# Patient Record
Sex: FEMALE | Race: WHITE | Employment: OTHER | URBAN - METROPOLITAN AREA
[De-identification: names, ages, dates, MRNs, and addresses within clinical notes are randomized per-mention and may not be internally consistent; named-entity substitution may affect disease eponyms.]

---

## 2017-03-06 ENCOUNTER — OFFICE VISIT (OUTPATIENT)
Dept: DERMATOLOGY | Age: 47
End: 2017-03-06

## 2017-03-06 DIAGNOSIS — L29.9 SCALP PRURITUS: ICD-10-CM

## 2017-03-06 DIAGNOSIS — L57.0 AK (ACTINIC KERATOSIS): ICD-10-CM

## 2017-03-06 DIAGNOSIS — D22.5 MELANOCYTIC NEVUS OF TRUNK: ICD-10-CM

## 2017-03-06 DIAGNOSIS — R20.9 DISTURBANCE OF SKIN SENSATION: ICD-10-CM

## 2017-03-06 DIAGNOSIS — D17.23 LIPOMA OF RIGHT LOWER EXTREMITY: Primary | ICD-10-CM

## 2017-03-06 PROCEDURE — 99213 OFFICE O/P EST LOW 20 MIN: CPT | Performed by: DERMATOLOGY

## 2018-02-06 RX ORDER — TRIAMCINOLONE ACETONIDE 1 MG/G
CREAM TOPICAL
Qty: 80 G | Refills: 0 | Status: SHIPPED | OUTPATIENT
Start: 2018-02-06 | End: 2021-07-06

## 2018-03-14 RX ORDER — CLINDAMYCIN AND BENZOYL PEROXIDE 10; 50 MG/G; MG/G
GEL TOPICAL
Refills: 3 | OUTPATIENT
Start: 2018-03-14

## 2018-07-30 ENCOUNTER — OFFICE VISIT (OUTPATIENT)
Dept: DERMATOLOGY | Age: 48
End: 2018-07-30

## 2018-07-30 ENCOUNTER — TELEPHONE (OUTPATIENT)
Dept: DERMATOLOGY | Age: 48
End: 2018-07-30

## 2018-07-30 DIAGNOSIS — R21 RASH: Primary | ICD-10-CM

## 2018-07-30 PROCEDURE — 1036F TOBACCO NON-USER: CPT | Performed by: DERMATOLOGY

## 2018-07-30 PROCEDURE — G8427 DOCREV CUR MEDS BY ELIG CLIN: HCPCS | Performed by: DERMATOLOGY

## 2018-07-30 PROCEDURE — G8421 BMI NOT CALCULATED: HCPCS | Performed by: DERMATOLOGY

## 2018-07-30 PROCEDURE — 99213 OFFICE O/P EST LOW 20 MIN: CPT | Performed by: DERMATOLOGY

## 2018-07-30 RX ORDER — LANOLIN ALCOHOL/MO/W.PET/CERES
3 CREAM (GRAM) TOPICAL DAILY
COMMUNITY
End: 2022-09-14

## 2018-07-30 RX ORDER — AMITRIPTYLINE HYDROCHLORIDE 50 MG/1
50 TABLET, FILM COATED ORAL NIGHTLY
COMMUNITY
End: 2021-07-23 | Stop reason: SDUPTHER

## 2018-07-30 RX ORDER — CLINDAMYCIN PHOSPHATE AND BENZOYL PEROXIDE 10; 50 MG/G; MG/G
GEL TOPICAL
Qty: 45 G | Refills: 3 | Status: SHIPPED | OUTPATIENT
Start: 2018-07-30 | End: 2019-01-08

## 2018-07-30 NOTE — PROGRESS NOTES
[facility]  Jeanelle Cranker, MD  [phone]      Shon Dhaliwal  1970    53 y.o. female     Date of Visit: 7/30/2018    Chief Complaint: skin lesions    History of Present Illness:    She complains of a tender erythematous papular eruption on the face and scalp. Had improvement in the past with clinda/BPO gel. She reports a history of staph skin infection and is concerned she has an infection. Had normal nasal culture on 11/9/16. Other Derm Hx:    SCC in situ of the left medial calf treated with curettage in March 2016. BCC the right lower calf treated with curettage in March 2016. SCC in situ of the left lower shintreated with curettage on 9/1/2015. Review of Systems:  Gen: Feels well, good sense of health. Skin: No new or changing moles, no history of keloids or hypertrophic scars. Heme: No abnormal bruising or bleeding. Past Medical History, Family History, Surgical History, Medications and Allergies reviewed.     Past Medical History:   Diagnosis Date    Anxiety     Atrophic kidney     left    Cancer (HCC)     skin (basal cell)    Cellulitis     recurrent    Cervical disc herniation     Endometriosis     Lumbar disc disorder 2/21/2012    Migraines     Narcolepsy     Nephrolithiasis 2/21/2012    Ovarian cyst 2/21/2012     Past Surgical History:   Procedure Laterality Date    FOOT SURGERY  12/27/2012    BROSTROM ANKLE LIGAMENT REPAIR LEFT, PARTIAL EXCISION OF BONE    LAPAROSCOPY      for endometriosis    LUMBAR FUSION  2008    SHOULDER SURGERY      left    TONSILLECTOMY  2006       Allergies   Allergen Reactions    Codeine Hives    Percocet [Oxycodone-Acetaminophen] Itching    Prednisone     Tetracyclines & Related      Outpatient Prescriptions Marked as Taking for the 7/30/18 encounter (Office Visit) with Dat Moise MD   Medication Sig Dispense Refill    amitriptyline (ELAVIL) 50 MG tablet Take 50 mg by mouth nightly      melatonin 3 MG

## 2018-07-30 NOTE — TELEPHONE ENCOUNTER
Pt states she has bumps on her hairline and post neck, she is washing with Hibiclens and feels she has staph all over her face. Pt scheduled for today.

## 2018-08-02 LAB
GRAM STAIN RESULT: NORMAL
WOUND/ABSCESS: NORMAL

## 2019-01-08 ENCOUNTER — OFFICE VISIT (OUTPATIENT)
Dept: DERMATOLOGY | Age: 49
End: 2019-01-08
Payer: MEDICARE

## 2019-01-08 DIAGNOSIS — L70.9 ADULT ACNE: Primary | ICD-10-CM

## 2019-01-08 DIAGNOSIS — D17.23 LIPOMA OF RIGHT LOWER EXTREMITY: ICD-10-CM

## 2019-01-08 DIAGNOSIS — L57.0 AK (ACTINIC KERATOSIS): ICD-10-CM

## 2019-01-08 DIAGNOSIS — D23.71: ICD-10-CM

## 2019-01-08 DIAGNOSIS — K13.0 CHEILITIS: ICD-10-CM

## 2019-01-08 PROCEDURE — 17000 DESTRUCT PREMALG LESION: CPT | Performed by: DERMATOLOGY

## 2019-01-08 PROCEDURE — 4004F PT TOBACCO SCREEN RCVD TLK: CPT | Performed by: DERMATOLOGY

## 2019-01-08 PROCEDURE — G8484 FLU IMMUNIZE NO ADMIN: HCPCS | Performed by: DERMATOLOGY

## 2019-01-08 PROCEDURE — G8421 BMI NOT CALCULATED: HCPCS | Performed by: DERMATOLOGY

## 2019-01-08 PROCEDURE — G8427 DOCREV CUR MEDS BY ELIG CLIN: HCPCS | Performed by: DERMATOLOGY

## 2019-01-08 PROCEDURE — 99213 OFFICE O/P EST LOW 20 MIN: CPT | Performed by: DERMATOLOGY

## 2019-01-08 RX ORDER — CLINDAMYCIN PHOSPHATE AND BENZOYL PEROXIDE 10; 50 MG/G; MG/G
GEL TOPICAL
Qty: 45 G | Refills: 3 | Status: SHIPPED | OUTPATIENT
Start: 2019-01-08 | End: 2019-01-11 | Stop reason: SDUPTHER

## 2019-01-11 ENCOUNTER — TELEPHONE (OUTPATIENT)
Dept: DERMATOLOGY | Age: 49
End: 2019-01-11

## 2019-01-11 RX ORDER — CLINDAMYCIN PHOSPHATE AND BENZOYL PEROXIDE 10; 50 MG/G; MG/G
GEL TOPICAL
Qty: 45 G | Refills: 3 | Status: SHIPPED | OUTPATIENT
Start: 2019-01-11 | End: 2021-07-26

## 2019-02-27 ENCOUNTER — PATIENT MESSAGE (OUTPATIENT)
Dept: DERMATOLOGY | Age: 49
End: 2019-02-27

## 2019-08-18 ENCOUNTER — HOSPITAL ENCOUNTER (EMERGENCY)
Age: 49
Discharge: HOME | End: 2019-08-19
Attending: EMERGENCY MEDICINE
Payer: MEDICARE

## 2019-08-18 DIAGNOSIS — R10.13 ABDOMINAL PAIN, EPIGASTRIC: Primary | ICD-10-CM

## 2019-08-18 DIAGNOSIS — N30.00 ACUTE CYSTITIS WITHOUT HEMATURIA: ICD-10-CM

## 2019-08-18 DIAGNOSIS — R10.13 DYSPEPSIA: ICD-10-CM

## 2019-08-18 PROCEDURE — 99284 EMERGENCY DEPT VISIT MOD MDM: CPT

## 2019-08-18 RX ORDER — SODIUM CHLORIDE, SODIUM LACTATE, POTASSIUM CHLORIDE, CALCIUM CHLORIDE 600; 310; 30; 20 MG/100ML; MG/100ML; MG/100ML; MG/100ML
1000 INJECTION, SOLUTION INTRAVENOUS ONCE
Status: COMPLETED | OUTPATIENT
Start: 2019-08-18 | End: 2019-08-19

## 2019-08-18 RX ORDER — ONDANSETRON 2 MG/ML
4 INJECTION INTRAMUSCULAR; INTRAVENOUS EVERY 30 MIN PRN
Status: DISCONTINUED | OUTPATIENT
Start: 2019-08-18 | End: 2019-08-19 | Stop reason: HOSPADM

## 2019-08-18 ASSESSMENT — PAIN SCALES - GENERAL: PAINLEVEL_OUTOF10: 8

## 2019-08-18 ASSESSMENT — PAIN DESCRIPTION - LOCATION: LOCATION: ABDOMEN

## 2019-08-18 ASSESSMENT — PAIN DESCRIPTION - ORIENTATION: ORIENTATION: UPPER

## 2019-08-18 ASSESSMENT — PAIN DESCRIPTION - PAIN TYPE: TYPE: ACUTE PAIN

## 2019-08-18 ASSESSMENT — PAIN DESCRIPTION - DESCRIPTORS: DESCRIPTORS: SHARP;BURNING

## 2019-08-19 VITALS
SYSTOLIC BLOOD PRESSURE: 114 MMHG | DIASTOLIC BLOOD PRESSURE: 76 MMHG | RESPIRATION RATE: 16 BRPM | HEART RATE: 63 BPM | TEMPERATURE: 98 F | OXYGEN SATURATION: 97 %

## 2019-08-19 LAB
A/G RATIO: 1.4 (ref 1.1–2.2)
ALBUMIN SERPL-MCNC: 4.2 G/DL (ref 3.4–5)
ALP BLD-CCNC: 69 U/L (ref 40–129)
ALT SERPL-CCNC: 9 U/L (ref 10–40)
ANION GAP SERPL CALCULATED.3IONS-SCNC: 15 MMOL/L (ref 3–16)
AST SERPL-CCNC: 17 U/L (ref 15–37)
BACTERIA: ABNORMAL /HPF
BILIRUB SERPL-MCNC: 0.7 MG/DL (ref 0–1)
BILIRUBIN URINE: NEGATIVE
BLOOD, URINE: NEGATIVE
BUN BLDV-MCNC: 8 MG/DL (ref 7–20)
CALCIUM SERPL-MCNC: 9.1 MG/DL (ref 8.3–10.6)
CHLORIDE BLD-SCNC: 102 MMOL/L (ref 99–110)
CLARITY: CLEAR
CO2: 23 MMOL/L (ref 21–32)
COLOR: YELLOW
CREAT SERPL-MCNC: 0.6 MG/DL (ref 0.6–1.1)
EKG ATRIAL RATE: 72 BPM
EKG DIAGNOSIS: NORMAL
EKG P AXIS: 56 DEGREES
EKG P-R INTERVAL: 140 MS
EKG Q-T INTERVAL: 390 MS
EKG QRS DURATION: 72 MS
EKG QTC CALCULATION (BAZETT): 427 MS
EKG R AXIS: 82 DEGREES
EKG T AXIS: 68 DEGREES
EKG VENTRICULAR RATE: 72 BPM
EPITHELIAL CELLS, UA: ABNORMAL /HPF
GFR AFRICAN AMERICAN: >60
GFR NON-AFRICAN AMERICAN: >60
GLOBULIN: 3 G/DL
GLUCOSE BLD-MCNC: 96 MG/DL (ref 70–99)
GLUCOSE URINE: NEGATIVE MG/DL
HCT VFR BLD CALC: 40.1 % (ref 36–48)
HEMOGLOBIN: 13.7 G/DL (ref 12–16)
KETONES, URINE: 15 MG/DL
LACTIC ACID: 1 MMOL/L (ref 0.4–2)
LEUKOCYTE ESTERASE, URINE: ABNORMAL
LIPASE: 21 U/L (ref 13–60)
MAGNESIUM: 1.8 MG/DL (ref 1.8–2.4)
MCH RBC QN AUTO: 32.5 PG (ref 26–34)
MCHC RBC AUTO-ENTMCNC: 34.2 G/DL (ref 31–36)
MCV RBC AUTO: 94.9 FL (ref 80–100)
MICROSCOPIC EXAMINATION: YES
NITRITE, URINE: POSITIVE
PDW BLD-RTO: 12.8 % (ref 12.4–15.4)
PH UA: 6.5 (ref 5–8)
PLATELET # BLD: 285 K/UL (ref 135–450)
PMV BLD AUTO: 7.6 FL (ref 5–10.5)
POTASSIUM REFLEX MAGNESIUM: 3.5 MMOL/L (ref 3.5–5.1)
PROTEIN UA: ABNORMAL MG/DL
RBC # BLD: 4.22 M/UL (ref 4–5.2)
RBC UA: ABNORMAL /HPF (ref 0–2)
SODIUM BLD-SCNC: 140 MMOL/L (ref 136–145)
SPECIFIC GRAVITY UA: 1.02 (ref 1–1.03)
TOTAL PROTEIN: 7.2 G/DL (ref 6.4–8.2)
TROPONIN: <0.01 NG/ML
URINE TYPE: ABNORMAL
UROBILINOGEN, URINE: 0.2 E.U./DL
WBC # BLD: 8.3 K/UL (ref 4–11)
WBC UA: ABNORMAL /HPF (ref 0–5)

## 2019-08-19 PROCEDURE — 96374 THER/PROPH/DIAG INJ IV PUSH: CPT

## 2019-08-19 PROCEDURE — 6370000000 HC RX 637 (ALT 250 FOR IP): Performed by: EMERGENCY MEDICINE

## 2019-08-19 PROCEDURE — 96361 HYDRATE IV INFUSION ADD-ON: CPT

## 2019-08-19 PROCEDURE — 93005 ELECTROCARDIOGRAM TRACING: CPT | Performed by: EMERGENCY MEDICINE

## 2019-08-19 PROCEDURE — 83735 ASSAY OF MAGNESIUM: CPT

## 2019-08-19 PROCEDURE — 36415 COLL VENOUS BLD VENIPUNCTURE: CPT

## 2019-08-19 PROCEDURE — 83605 ASSAY OF LACTIC ACID: CPT

## 2019-08-19 PROCEDURE — 6360000002 HC RX W HCPCS: Performed by: EMERGENCY MEDICINE

## 2019-08-19 PROCEDURE — 2580000003 HC RX 258: Performed by: EMERGENCY MEDICINE

## 2019-08-19 PROCEDURE — 80053 COMPREHEN METABOLIC PANEL: CPT

## 2019-08-19 PROCEDURE — 83690 ASSAY OF LIPASE: CPT

## 2019-08-19 PROCEDURE — 84484 ASSAY OF TROPONIN QUANT: CPT

## 2019-08-19 PROCEDURE — 85027 COMPLETE CBC AUTOMATED: CPT

## 2019-08-19 PROCEDURE — 81001 URINALYSIS AUTO W/SCOPE: CPT

## 2019-08-19 RX ORDER — OMEPRAZOLE 20 MG/1
20 CAPSULE, DELAYED RELEASE ORAL
Qty: 30 CAPSULE | Refills: 5 | Status: SHIPPED | OUTPATIENT
Start: 2019-08-19 | End: 2019-12-04

## 2019-08-19 RX ORDER — SUCRALFATE 1 G/1
1 TABLET ORAL 4 TIMES DAILY
Qty: 120 TABLET | Refills: 3 | Status: SHIPPED | OUTPATIENT
Start: 2019-08-19 | End: 2019-12-04

## 2019-08-19 RX ORDER — NITROFURANTOIN 25; 75 MG/1; MG/1
100 CAPSULE ORAL 2 TIMES DAILY
Qty: 10 CAPSULE | Refills: 0 | Status: SHIPPED | OUTPATIENT
Start: 2019-08-19 | End: 2019-08-24

## 2019-08-19 RX ORDER — ONDANSETRON 4 MG/1
4 TABLET, ORALLY DISINTEGRATING ORAL EVERY 8 HOURS PRN
Qty: 12 TABLET | Refills: 0 | Status: SHIPPED | OUTPATIENT
Start: 2019-08-19 | End: 2019-12-04

## 2019-08-19 RX ADMIN — ONDANSETRON 4 MG: 2 INJECTION INTRAMUSCULAR; INTRAVENOUS at 00:24

## 2019-08-19 RX ADMIN — LIDOCAINE HYDROCHLORIDE: 20 SOLUTION ORAL; TOPICAL at 00:24

## 2019-08-19 RX ADMIN — SODIUM CHLORIDE, POTASSIUM CHLORIDE, SODIUM LACTATE AND CALCIUM CHLORIDE 1000 ML: 600; 310; 30; 20 INJECTION, SOLUTION INTRAVENOUS at 00:25

## 2019-08-19 ASSESSMENT — PAIN SCALES - GENERAL: PAINLEVEL_OUTOF10: 0

## 2019-08-19 NOTE — ED PROVIDER NOTES
[id number]          ATTENDING PHYSICIAN NOTE       Date of evaluation: 8/18/2019    Chief Complaint     Abdominal Pain and Emesis      History of Present Illness     Jeff Frias is a 51 y.o. female who presents with 2 weeks of abdominal pain, of a burning quality in the epigastrium. Reports is nonradiating. No pain in the right upper quadrant. She has had some nausea with this, vomited today. She reports that it is worse with eating. Denies bowel or bladder symptoms associated with it. She reports she has had a UTI, but finished taking medication last week but has not had dysuria or urgency since that time. Review of Systems     Review of Systems  Positives negatives listed in HPI; otherwise all systems are reviewed and were negative  Past Medical, Surgical, Family, and Social History     She has a past medical history of Anxiety, Atrophic kidney, Cancer ([facility] [address] Cellulitis, Cervical disc herniation, Endometriosis, Lumbar disc disorder, Migraines, Narcolepsy, Nephrolithiasis, and Ovarian cyst.  She has a past surgical history that includes Tonsillectomy (2006); lumbar fusion (2008); laparoscopy; shoulder surgery; and Foot surgery (12/27/2012). Her family history includes Cancer in her mother and sister; Diabetes in her maternal aunt and sister; Hypertension in her father and sister. She reports that she has been smoking cigarettes. She has a 6.00 pack-year smoking history. She has never used smokeless tobacco. She reports that she drinks alcohol. She reports that she does not use drugs. Medications     Previous Medications    AMITRIPTYLINE (ELAVIL) 50 MG TABLET    Take 50 mg by mouth nightly    CLINDAMYCIN-BENZOYL PER, REFR, 1.2-5 % GEL    Apply to the face once a day in the morning for acne. CLONAZEPAM (KLONOPIN) 1 MG TABLET    Take 1 mg by mouth nightly as needed. FUROSEMIDE (LASIX) 20 MG TABLET    Take 20 mg by mouth as needed.       IBUPROFEN referral, but for the moment we will treat for dyspepsia type symptoms. .        Clinical Impression     1. Abdominal pain, epigastric    2. Dyspepsia    3.  Acute cystitis without hematuria        Disposition     PATIENT REFERRED TO:  Kristin Chu MD  [address] [facility]  [phone]    In 3 days        DISCHARGE MEDICATIONS:  New Prescriptions    NITROFURANTOIN, MACROCRYSTAL-MONOHYDRATE, (MACROBID) 100 MG CAPSULE    Take 1 capsule by mouth 2 times daily for 5 days    OMEPRAZOLE (PRILOSEC) 20 MG DELAYED RELEASE CAPSULE    Take 1 capsule by mouth every morning (before breakfast)    ONDANSETRON (ZOFRAN ODT) 4 MG DISINTEGRATING TABLET    Take 1 tablet by mouth every 8 hours as needed for Nausea    SUCRALFATE (CARAFATE) 1 GM TABLET    Take 1 tablet by mouth 4 times daily       DISPOSITION Decision To Discharge 08/19/2019 02:11:23 AM        Cayden Tyler MD  08/19/19 1705

## 2019-12-03 ENCOUNTER — TELEPHONE (OUTPATIENT)
Dept: DERMATOLOGY | Age: 49
End: 2019-12-03

## 2019-12-04 ENCOUNTER — TELEPHONE (OUTPATIENT)
Dept: DERMATOLOGY | Age: 49
End: 2019-12-04

## 2019-12-04 ENCOUNTER — OFFICE VISIT (OUTPATIENT)
Dept: DERMATOLOGY | Age: 49
End: 2019-12-04
Payer: MEDICARE

## 2019-12-04 DIAGNOSIS — L70.9 ADULT ACNE: Primary | ICD-10-CM

## 2019-12-04 PROCEDURE — 4004F PT TOBACCO SCREEN RCVD TLK: CPT | Performed by: DERMATOLOGY

## 2019-12-04 PROCEDURE — G8421 BMI NOT CALCULATED: HCPCS | Performed by: DERMATOLOGY

## 2019-12-04 PROCEDURE — 99213 OFFICE O/P EST LOW 20 MIN: CPT | Performed by: DERMATOLOGY

## 2019-12-04 PROCEDURE — G8427 DOCREV CUR MEDS BY ELIG CLIN: HCPCS | Performed by: DERMATOLOGY

## 2019-12-04 PROCEDURE — G8484 FLU IMMUNIZE NO ADMIN: HCPCS | Performed by: DERMATOLOGY

## 2019-12-04 RX ORDER — SULFAMETHOXAZOLE AND TRIMETHOPRIM 800; 160 MG/1; MG/1
1 TABLET ORAL 2 TIMES DAILY
Qty: 60 TABLET | Refills: 1 | Status: SHIPPED | OUTPATIENT
Start: 2019-12-04 | End: 2020-06-01

## 2019-12-04 RX ORDER — FLUCONAZOLE 150 MG/1
150 TABLET ORAL ONCE
Qty: 1 TABLET | Refills: 0 | Status: SHIPPED | OUTPATIENT
Start: 2019-12-04 | End: 2019-12-04

## 2020-01-08 ENCOUNTER — OFFICE VISIT (OUTPATIENT)
Dept: DERMATOLOGY | Age: 50
End: 2020-01-08
Payer: MEDICARE

## 2020-01-08 PROCEDURE — 99213 OFFICE O/P EST LOW 20 MIN: CPT | Performed by: DERMATOLOGY

## 2020-01-08 PROCEDURE — G8427 DOCREV CUR MEDS BY ELIG CLIN: HCPCS | Performed by: DERMATOLOGY

## 2020-01-08 PROCEDURE — 4004F PT TOBACCO SCREEN RCVD TLK: CPT | Performed by: DERMATOLOGY

## 2020-01-08 PROCEDURE — G8421 BMI NOT CALCULATED: HCPCS | Performed by: DERMATOLOGY

## 2020-01-08 PROCEDURE — G8484 FLU IMMUNIZE NO ADMIN: HCPCS | Performed by: DERMATOLOGY

## 2020-01-08 RX ORDER — DEXTROAMPHETAMINE SACCHARATE, AMPHETAMINE ASPARTATE, DEXTROAMPHETAMINE SULFATE AND AMPHETAMINE SULFATE 7.5; 7.5; 7.5; 7.5 MG/1; MG/1; MG/1; MG/1
1 TABLET ORAL
COMMUNITY
Start: 2020-01-21 | End: 2021-07-06

## 2020-01-14 ENCOUNTER — HOSPITAL ENCOUNTER (OUTPATIENT)
Dept: NUCLEAR MEDICINE | Age: 50
Discharge: HOME | End: 2020-01-14
Payer: MEDICARE

## 2020-01-14 PROCEDURE — 78707 K FLOW/FUNCT IMAGE W/O DRUG: CPT

## 2020-01-14 PROCEDURE — A9562 TC99M MERTIATIDE: HCPCS | Performed by: UROLOGY

## 2020-01-14 PROCEDURE — 3430000000 HC RX DIAGNOSTIC RADIOPHARMACEUTICAL: Performed by: UROLOGY

## 2020-01-14 RX ADMIN — Medication 8.7 MILLICURIE: at 11:05

## 2020-01-31 ENCOUNTER — HOSPITAL ENCOUNTER (OUTPATIENT)
Dept: CT IMAGING | Age: 50
Discharge: HOME | End: 2020-01-31
Payer: MEDICARE

## 2020-01-31 PROCEDURE — 74176 CT ABD & PELVIS W/O CONTRAST: CPT

## 2020-04-30 ENCOUNTER — OFFICE VISIT (OUTPATIENT)
Dept: PRIMARY CARE CLINIC | Age: 50
End: 2020-04-30
Payer: MEDICARE

## 2020-04-30 VITALS — TEMPERATURE: 98.7 F

## 2020-04-30 PROCEDURE — 99213 OFFICE O/P EST LOW 20 MIN: CPT | Performed by: NURSE PRACTITIONER

## 2020-04-30 PROCEDURE — G8428 CUR MEDS NOT DOCUMENT: HCPCS | Performed by: NURSE PRACTITIONER

## 2020-04-30 PROCEDURE — 4004F PT TOBACCO SCREEN RCVD TLK: CPT | Performed by: NURSE PRACTITIONER

## 2020-04-30 PROCEDURE — G8421 BMI NOT CALCULATED: HCPCS | Performed by: NURSE PRACTITIONER

## 2020-05-03 LAB
SARS-COV-2: NOT DETECTED
SOURCE: NORMAL

## 2020-05-04 NOTE — RESULT ENCOUNTER NOTE
Please contact patient with their testing results: Your test for COVID-19, also known as novel coronavirus, came back negative. No virus was detected from the sample from your nose. Until your symptoms are fully resolved, you may still be contagious. We recommend that you remain isolated for 7 days minimum or 72 hours after your symptoms have completely resolved, whichever is longer. For example, if all symptoms improve after 2 days, you should still remain isolated for 7 days. If your symptoms get better after 10 days, you should remain isolated for 13 days. Continually monitor symptoms. Contact a medical provider if symptoms are worsening. If you have any additional questions, contact your doctor.     For additional information, please visit the Centers for Disease Control and Prevention (Whodinir.com.cy

## 2020-06-01 RX ORDER — SULFAMETHOXAZOLE AND TRIMETHOPRIM 800; 160 MG/1; MG/1
TABLET ORAL
Qty: 60 TABLET | Refills: 1 | Status: SHIPPED | OUTPATIENT
Start: 2020-06-01 | End: 2021-07-06 | Stop reason: ALTCHOICE

## 2020-06-09 ENCOUNTER — TELEPHONE (OUTPATIENT)
Dept: DERMATOLOGY | Age: 50
End: 2020-06-09

## 2020-07-07 ENCOUNTER — TELEPHONE (OUTPATIENT)
Dept: DERMATOLOGY | Age: 50
End: 2020-07-07

## 2020-07-07 NOTE — TELEPHONE ENCOUNTER
Dr Dionne Delgado patient  Pt c/b #[phone]  Pt stated  Having some hair/scalp breakouts including face as well doesn't know if she should continue taking bactrim everyday.   Please c/b to discuss

## 2020-07-08 NOTE — TELEPHONE ENCOUNTER
Called and left message for patient to call back office. Can offer an opening on 7/9/20 if still available.

## 2020-07-10 ENCOUNTER — OFFICE VISIT (OUTPATIENT)
Dept: DERMATOLOGY | Age: 50
End: 2020-07-10
Payer: MEDICARE

## 2020-07-10 VITALS — TEMPERATURE: 97.7 F

## 2020-07-10 PROCEDURE — G8421 BMI NOT CALCULATED: HCPCS | Performed by: DERMATOLOGY

## 2020-07-10 PROCEDURE — 99213 OFFICE O/P EST LOW 20 MIN: CPT | Performed by: DERMATOLOGY

## 2020-07-10 PROCEDURE — G8427 DOCREV CUR MEDS BY ELIG CLIN: HCPCS | Performed by: DERMATOLOGY

## 2020-07-10 PROCEDURE — 4004F PT TOBACCO SCREEN RCVD TLK: CPT | Performed by: DERMATOLOGY

## 2020-07-10 RX ORDER — SELENIUM SULFIDE 2.5 MG/100ML
LOTION TOPICAL
Qty: 1 BOTTLE | Refills: 5 | Status: SHIPPED | OUTPATIENT
Start: 2020-07-10 | End: 2021-11-19

## 2020-07-10 NOTE — PROGRESS NOTES
[facility]  Edith Aceves MD  [phone]      Kartik Keating  1970    53 y.o. female     Date of Visit: 7/10/2020    Chief Complaint: scalp rash    History of Present Illness:    1. F/u for scalp pruritus - flared recently. Scratching has led to multiple painful lesions. Started Bactrim again recently with some improvement. 2.  Follow up for adult acne with excoriations - using Differin and clinda/BPO gel daily with some improvement. Review of Systems:  Skin: No new or changing moles. Past Medical History, Family History, Surgical History, Medications and Allergies reviewed. Past Medical History:   Diagnosis Date    Anxiety     Atrophic kidney     left    Cancer (HCC)     skin (basal cell)    Cellulitis     recurrent    Cervical disc herniation     Endometriosis     Lumbar disc disorder 2/21/2012    Migraines     Narcolepsy     Nephrolithiasis 2/21/2012    Ovarian cyst 2/21/2012     Past Surgical History:   Procedure Laterality Date    FOOT SURGERY  12/27/2012    BROSTROM ANKLE LIGAMENT REPAIR LEFT, PARTIAL EXCISION OF BONE    LAPAROSCOPY      for endometriosis    LUMBAR FUSION  2008    SHOULDER SURGERY      left    TONSILLECTOMY  2006       Allergies   Allergen Reactions    Codeine Hives    Percocet [Oxycodone-Acetaminophen] Itching    Prednisone     Tetracyclines & Related      Outpatient Medications Marked as Taking for the 7/10/20 encounter (Office Visit) with Griffin Arrieta MD   Medication Sig Dispense Refill    selenium sulfide (SELSUN) 2.5 % lotion Dispense shampoo. Use to wash the hair 3 times weekly. Leave on scalp for 5 minutes prior to rinsing. 1 Bottle 5    sulfamethoxazole-trimethoprim (BACTRIM DS;SEPTRA DS) 800-160 MG per tablet TAKE 1 TABLET BY MOUTH TWICE A DAY 60 tablet 1    Clindamycin-Benzoyl Per, Refr, 1.2-5 % GEL Apply to the face once a day in the morning for acne.  45 g 3    amitriptyline (ELAVIL) 50 MG tablet Take 50 mg by mouth nightly      melatonin 3 MG TABS tablet Take 3 mg by mouth daily      triamcinolone (KENALOG) 0.1 % cream APPLY TO AFFECTED AREA TWICE DAILY FOR UP TO 2 WEEKS OR UNTIL IMPROVED. 80 g 0    mupirocin (BACTROBAN) 2 % ointment Apply to affected areas of the neck and scalp twice daily until improved. 22 g 1    ibuprofen (IBU) 800 MG tablet Take 1 tablet by mouth every 8 hours as needed for Pain. Take 1 tab every 8 hours with food as needed for pain and swelling   120 tablet 3    furosemide (LASIX) 20 MG tablet Take 20 mg by mouth as needed.  traMADol (ULTRAM) 50 MG tablet 1-2 tablets by mouth every 8 hours as needed 60 tablet 1    L-Lysine 500 MG TABS Take 500 mg by mouth daily.  clonazepam (KLONOPIN) 1 MG tablet Take 1 mg by mouth nightly as needed. Physical Examination       The following were examined and determined to be normal: Psych/Neuro, Conjunctivae/eyelids, Gums/teeth/lips, Neck, Back, RUE and LUE. The following were examined and determined to be abnormal: Scalp/hair and Head/face. Well appearing. 1.  Scalp with scattered excoriations. 2.  Chin with erythematous papules with excoriations. Assessment and Plan     1. Scalp pruritus/pain with excoriations -     Selenium sulfide 2.5% shampoo 3 times weekly. Leave on scalp for 5 minutes prior to rinsing out. Trim nails    No picking! 2.  Adult acne with excoriations - improved    Continue Differin 0.1% gel    Clinda/BPO gel daily

## 2021-01-05 ENCOUNTER — TELEPHONE (OUTPATIENT)
Dept: DERMATOLOGY | Age: 51
End: 2021-01-05

## 2021-01-05 NOTE — TELEPHONE ENCOUNTER
Patient states the shampoo Dr Dulce Montgomery prescribed is ineffective and her head is still sore. She continues to have head sores. Please advise. Thank you!

## 2021-01-06 NOTE — TELEPHONE ENCOUNTER
Called and left message for patient to call back office. Please offer patient any upcoming openings.

## 2021-01-18 ENCOUNTER — OFFICE VISIT (OUTPATIENT)
Dept: DERMATOLOGY | Age: 51
End: 2021-01-18
Payer: MEDICARE

## 2021-01-18 VITALS — TEMPERATURE: 97 F

## 2021-01-18 DIAGNOSIS — R20.9 DISTURBANCE OF SKIN SENSATION: ICD-10-CM

## 2021-01-18 DIAGNOSIS — F42.4 EXCORIATION (SKIN-PICKING) DISORDER: Primary | ICD-10-CM

## 2021-01-18 DIAGNOSIS — L70.9 ADULT ACNE: ICD-10-CM

## 2021-01-18 PROCEDURE — G8421 BMI NOT CALCULATED: HCPCS | Performed by: DERMATOLOGY

## 2021-01-18 PROCEDURE — 99213 OFFICE O/P EST LOW 20 MIN: CPT | Performed by: DERMATOLOGY

## 2021-01-18 PROCEDURE — G8427 DOCREV CUR MEDS BY ELIG CLIN: HCPCS | Performed by: DERMATOLOGY

## 2021-01-18 PROCEDURE — 4004F PT TOBACCO SCREEN RCVD TLK: CPT | Performed by: DERMATOLOGY

## 2021-01-18 PROCEDURE — 3017F COLORECTAL CA SCREEN DOC REV: CPT | Performed by: DERMATOLOGY

## 2021-01-18 PROCEDURE — G8484 FLU IMMUNIZE NO ADMIN: HCPCS | Performed by: DERMATOLOGY

## 2021-01-18 NOTE — PATIENT INSTRUCTIONS
what you will do instead of smoking. Tell your family and friends that you are going to try to quit and on what date. Put together a list of phone numbers of friends and family members who can give you support when you feel you might break down and have a cigarette. Before your quit day, put all tobacco products, ashtrays, and lighters away. 3. Put your plan into action  When you wake up on your quit day, start using a nicotine replacement method if you had planned to do that. For the first few days after you quit, you may have some signs of nicotine withdrawal. You may feel restless and cranky. You may find it hard to think. You might need to change your dose of nicotine if these signs upset you. Do not smoke! If you feel like you want to smoke, call a friend or family member who has agreed to help you. Also, there might be someone in your doctor's office you can call. Put into action your plans for doing things other than smoking. For example, when you feel the urge to smoke, you might take a walk. Or, you might visit friends who do not smoke. It is best to stay away from places where you used to smoke. 4. If you return to smoking--  Quitting smoking is not easy. Many people have to try several times before they succeed. If you start smoking again, call your primary care doctor's office soon to talk about what happened. Think about what you can do to keep from smoking when you try to quit again. Part of this handout is provided by the [employer]. More information is available at the American Lung Association https://flores.com/.  This information provides a general overview and may not apply to everyone. Talk to your primary care doctor to find out if this information applies to you and to get more information on this subject.

## 2021-04-16 ENCOUNTER — APPOINTMENT (OUTPATIENT)
Dept: GENERAL RADIOLOGY | Age: 51
End: 2021-04-16
Payer: MEDICARE

## 2021-04-16 ENCOUNTER — HOSPITAL ENCOUNTER (EMERGENCY)
Age: 51
Discharge: HOME | End: 2021-04-16
Attending: EMERGENCY MEDICINE
Payer: MEDICARE

## 2021-04-16 VITALS
OXYGEN SATURATION: 99 % | RESPIRATION RATE: 18 BRPM | HEIGHT: 69 IN | BODY MASS INDEX: 37.03 KG/M2 | SYSTOLIC BLOOD PRESSURE: 143 MMHG | TEMPERATURE: 97.9 F | DIASTOLIC BLOOD PRESSURE: 83 MMHG | WEIGHT: 250 LBS | HEART RATE: 113 BPM

## 2021-04-16 DIAGNOSIS — S92.414A CLOSED NONDISPLACED FRACTURE OF PROXIMAL PHALANX OF RIGHT GREAT TOE, INITIAL ENCOUNTER: Primary | ICD-10-CM

## 2021-04-16 DIAGNOSIS — S93.621A SPRAIN OF LIGAMENT OF TARSOMETATARSAL JOINT OF RIGHT FOOT, INITIAL ENCOUNTER: ICD-10-CM

## 2021-04-16 PROCEDURE — 29515 APPLICATION SHORT LEG SPLINT: CPT

## 2021-04-16 PROCEDURE — 73620 X-RAY EXAM OF FOOT: CPT

## 2021-04-16 PROCEDURE — 73630 X-RAY EXAM OF FOOT: CPT

## 2021-04-16 PROCEDURE — 99284 EMERGENCY DEPT VISIT MOD MDM: CPT

## 2021-04-16 PROCEDURE — 6360000002 HC RX W HCPCS: Performed by: NURSE PRACTITIONER

## 2021-04-16 PROCEDURE — 6370000000 HC RX 637 (ALT 250 FOR IP): Performed by: NURSE PRACTITIONER

## 2021-04-16 PROCEDURE — 73610 X-RAY EXAM OF ANKLE: CPT

## 2021-04-16 PROCEDURE — 96372 THER/PROPH/DIAG INJ SC/IM: CPT

## 2021-04-16 RX ORDER — HYDROCODONE BITARTRATE AND ACETAMINOPHEN 5; 325 MG/1; MG/1
1 TABLET ORAL ONCE
Status: COMPLETED | OUTPATIENT
Start: 2021-04-16 | End: 2021-04-16

## 2021-04-16 RX ORDER — NAPROXEN 500 MG/1
500 TABLET ORAL 2 TIMES DAILY WITH MEALS
Qty: 40 TABLET | Refills: 0 | Status: SHIPPED | OUTPATIENT
Start: 2021-04-16 | End: 2021-07-06

## 2021-04-16 RX ORDER — HYDROCODONE BITARTRATE AND ACETAMINOPHEN 5; 325 MG/1; MG/1
1-2 TABLET ORAL EVERY 6 HOURS PRN
Qty: 15 TABLET | Refills: 0 | Status: SHIPPED | OUTPATIENT
Start: 2021-04-16 | End: 2021-04-21

## 2021-04-16 RX ADMIN — HYDROCODONE BITARTRATE AND ACETAMINOPHEN 1 TABLET: 5; 325 TABLET ORAL at 23:12

## 2021-04-16 RX ADMIN — HYDROMORPHONE HYDROCHLORIDE 1 MG: 1 INJECTION, SOLUTION INTRAMUSCULAR; INTRAVENOUS; SUBCUTANEOUS at 23:12

## 2021-04-16 RX ADMIN — HYDROCODONE BITARTRATE AND ACETAMINOPHEN 1 TABLET: 5; 325 TABLET ORAL at 21:11

## 2021-04-16 ASSESSMENT — PAIN SCALES - GENERAL
PAINLEVEL_OUTOF10: 10

## 2021-04-16 ASSESSMENT — PAIN DESCRIPTION - ORIENTATION: ORIENTATION: RIGHT

## 2021-04-17 NOTE — ED NOTES
Bed: A07-07  Expected date:   Expected time:   Means of arrival:   Comments:  iraj Naqvi RN  04/16/21 2018

## 2021-04-17 NOTE — ED PROVIDER NOTES
RADIOLOGY:  See EMR    LABS:   No results found for this visit on 04/16/21. RECENT VITALS:  BP: (!) 143/83, Temp: 97.9 °F (36.6 °C), Pulse: 113, Resp: 18, SpO2: 99 %       ED Course     Nursing Notes, Past Medical Hx, Past Surgical Hx, Social Hx, Allergies, and Family Hx were reviewed. The patient was given the following medications:  Orders Placed This Encounter   Medications    HYDROcodone-acetaminophen (NORCO) 5-325 MG per tablet 1 tablet    naproxen (NAPROSYN) 500 MG tablet     Sig: Take 1 tablet by mouth 2 times daily (with meals)     Dispense:  40 tablet     Refill:  0    HYDROcodone-acetaminophen (NORCO) 5-325 MG per tablet 1 tablet    HYDROcodone-acetaminophen (NORCO) 5-325 MG per tablet     Sig: Take 1-2 tablets by mouth every 6 hours as needed for Pain for up to 5 days. Intended supply: 3 days. Take lowest dose possible to manage pain     Dispense:  15 tablet     Refill:  0    HYDROmorphone (DILAUDID) injection 1 mg            CONSULTS:  None    MEDICAL DECISION MAKING / ASSESSMENT / PLAN     David Hawkins is a 49 y.o. female who presents with complaints as noted in HPI. Patient presents to the emergency department with complaint of right foot and ankle injury earlier this evening. On presentation she appears to be in a significant amount of pain, has tenderness throughout her anterior and medial ankle joint as well as the dorsal and plantar foot. Unable to range without significant pain, neurovascularly intact. X-rays of the foot and ankle were obtained; with findings consistent with nondisplaced oblique fracture of the first proximal phalanx. Patient also had noted widening of her midfoot concerning for Lisfranc ligamentous injury as well. Patient was placed in a posterior splint with a footplate, and provided with crutches as well as instructions on remaining nonweightbearing until seen by podiatry. She was referred to Dr. Danyelle Molina.  Provided with prescription for naproxen as well as [place] and instructions on use. This patient was also evaluated by the attending physician. All care plans were discussed and agreed upon. Clinical Impression     1. Closed nondisplaced fracture of proximal phalanx of right great toe, initial encounter    2. Sprain of ligament of tarsometatarsal joint of right foot, initial encounter        Disposition     PATIENT REFERRED TO:  Kb Stephenson, [address] [place] [address], [address]  [phone]      call for follow up appointment in 1 week      DISCHARGE MEDICATIONS:  Discharge Medication List as of 4/16/2021 11:29 PM      START taking these medications    Details   naproxen (NAPROSYN) 500 MG tablet Take 1 tablet by mouth 2 times daily (with meals), Disp-40 tablet, R-0Print      HYDROcodone-acetaminophen (NORCO) 5-325 MG per tablet Take 1-2 tablets by mouth every 6 hours as needed for Pain for up to 5 days. Intended supply: 3 days.  Take lowest dose possible to manage pain, Disp-15 tablet, R-0Print             DISPOSITION  Home in stable condition        PHOENIX Reynoso - TAMMY  04/17/21 0004

## 2021-04-17 NOTE — ED PROVIDER NOTES
ED Attending Attestation Note     Date of evaluation: 4/16/2021    This patient was seen by the advance practice provider. I have seen and examined the patient, agree with the workup, evaluation, management and diagnosis. The care plan has been discussed. My assessment reveals a well-appearing woman in no acute distress reporting pain of her right ankle and foot, specifically at her right first MTP joint.         Lindsey Armando MD  04/16/21 4522

## 2021-04-17 NOTE — ED NOTES
ACE wrap applied to pt's right foot. She declined to have ice placed on the area. Teaching for safe use of the crutches provided. Pt is able to return safe demonstration of use.       Marine Rouse RN  04/16/21 3002

## 2021-07-06 ENCOUNTER — HOSPITAL ENCOUNTER (EMERGENCY)
Age: 51
Discharge: HOME | End: 2021-07-06
Attending: EMERGENCY MEDICINE
Payer: MEDICARE

## 2021-07-06 VITALS
WEIGHT: 224.87 LBS | DIASTOLIC BLOOD PRESSURE: 80 MMHG | HEART RATE: 76 BPM | BODY MASS INDEX: 34.08 KG/M2 | SYSTOLIC BLOOD PRESSURE: 117 MMHG | HEIGHT: 68 IN | OXYGEN SATURATION: 96 % | RESPIRATION RATE: 16 BRPM | TEMPERATURE: 99.6 F

## 2021-07-06 DIAGNOSIS — G89.29 ACUTE EXACERBATION OF CHRONIC LOW BACK PAIN: Primary | ICD-10-CM

## 2021-07-06 DIAGNOSIS — M54.2 NECK PAIN ON RIGHT SIDE: ICD-10-CM

## 2021-07-06 DIAGNOSIS — M54.50 ACUTE EXACERBATION OF CHRONIC LOW BACK PAIN: Primary | ICD-10-CM

## 2021-07-06 PROCEDURE — 6360000002 HC RX W HCPCS: Performed by: EMERGENCY MEDICINE

## 2021-07-06 PROCEDURE — 2580000003 HC RX 258: Performed by: EMERGENCY MEDICINE

## 2021-07-06 PROCEDURE — 96365 THER/PROPH/DIAG IV INF INIT: CPT

## 2021-07-06 PROCEDURE — 6370000000 HC RX 637 (ALT 250 FOR IP): Performed by: EMERGENCY MEDICINE

## 2021-07-06 PROCEDURE — 96375 TX/PRO/DX INJ NEW DRUG ADDON: CPT

## 2021-07-06 PROCEDURE — 99284 EMERGENCY DEPT VISIT MOD MDM: CPT

## 2021-07-06 RX ORDER — ACETAMINOPHEN 325 MG/1
650 TABLET ORAL ONCE
Status: COMPLETED | OUTPATIENT
Start: 2021-07-06 | End: 2021-07-06

## 2021-07-06 RX ORDER — ACETAMINOPHEN 500 MG
TABLET ORAL
COMMUNITY
Start: 2020-10-02

## 2021-07-06 RX ORDER — KETOROLAC TROMETHAMINE 30 MG/ML
15 INJECTION, SOLUTION INTRAMUSCULAR; INTRAVENOUS ONCE
Status: COMPLETED | OUTPATIENT
Start: 2021-07-06 | End: 2021-07-06

## 2021-07-06 RX ORDER — METHYLPHENIDATE HYDROCHLORIDE 10 MG/1
10 TABLET ORAL 3 TIMES DAILY
COMMUNITY
End: 2021-09-13 | Stop reason: SDUPTHER

## 2021-07-06 RX ORDER — METHOCARBAMOL 750 MG/1
750 TABLET, FILM COATED ORAL 4 TIMES DAILY
Qty: 20 TABLET | Refills: 0 | Status: SHIPPED | OUTPATIENT
Start: 2021-07-06 | End: 2021-07-07

## 2021-07-06 RX ORDER — LIDOCAINE 4 G/G
1 PATCH TOPICAL ONCE
Status: DISCONTINUED | OUTPATIENT
Start: 2021-07-06 | End: 2021-07-06 | Stop reason: HOSPADM

## 2021-07-06 RX ORDER — LIDOCAINE 4 G/G
1 PATCH TOPICAL DAILY
Status: DISCONTINUED | OUTPATIENT
Start: 2021-07-06 | End: 2021-07-06

## 2021-07-06 RX ORDER — DIAZEPAM 5 MG/1
TABLET ORAL
COMMUNITY
Start: 2020-11-19 | End: 2022-08-03

## 2021-07-06 RX ADMIN — METHOCARBAMOL 1000 MG: 100 INJECTION, SOLUTION INTRAMUSCULAR; INTRAVENOUS at 04:37

## 2021-07-06 RX ADMIN — KETOROLAC TROMETHAMINE 15 MG: 30 INJECTION, SOLUTION INTRAMUSCULAR; INTRAVENOUS at 04:15

## 2021-07-06 RX ADMIN — ACETAMINOPHEN 650 MG: 325 TABLET ORAL at 04:16

## 2021-07-06 RX ADMIN — HYDROMORPHONE HYDROCHLORIDE 1 MG: 1 INJECTION, SOLUTION INTRAMUSCULAR; INTRAVENOUS; SUBCUTANEOUS at 05:37

## 2021-07-06 ASSESSMENT — PAIN SCALES - GENERAL
PAINLEVEL_OUTOF10: 10
PAINLEVEL_OUTOF10: 8
PAINLEVEL_OUTOF10: 10
PAINLEVEL_OUTOF10: 0
PAINLEVEL_OUTOF10: 10
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
## 2021-07-06 NOTE — ED NOTES
Bed: A07-07  Expected date:   Expected time:   Means of arrival:   Comments:  Enrico Gusman 31, RN  07/06/21 0069

## 2021-07-07 ENCOUNTER — APPOINTMENT (OUTPATIENT)
Dept: MRI IMAGING | Age: 51
End: 2021-07-07
Payer: MEDICARE

## 2021-07-07 ENCOUNTER — HOSPITAL ENCOUNTER (EMERGENCY)
Age: 51
Discharge: HOME | End: 2021-07-07
Attending: EMERGENCY MEDICINE
Payer: MEDICARE

## 2021-07-07 VITALS
OXYGEN SATURATION: 98 % | HEART RATE: 77 BPM | TEMPERATURE: 98.4 F | DIASTOLIC BLOOD PRESSURE: 51 MMHG | SYSTOLIC BLOOD PRESSURE: 154 MMHG | RESPIRATION RATE: 18 BRPM

## 2021-07-07 DIAGNOSIS — M54.12 CERVICAL RADICULOPATHY: ICD-10-CM

## 2021-07-07 DIAGNOSIS — M54.2 NECK PAIN: Primary | ICD-10-CM

## 2021-07-07 PROCEDURE — 99285 EMERGENCY DEPT VISIT HI MDM: CPT

## 2021-07-07 PROCEDURE — 72141 MRI NECK SPINE W/O DYE: CPT

## 2021-07-07 PROCEDURE — 96374 THER/PROPH/DIAG INJ IV PUSH: CPT

## 2021-07-07 PROCEDURE — 96376 TX/PRO/DX INJ SAME DRUG ADON: CPT

## 2021-07-07 PROCEDURE — 6360000002 HC RX W HCPCS: Performed by: PHYSICIAN ASSISTANT

## 2021-07-07 PROCEDURE — 6370000000 HC RX 637 (ALT 250 FOR IP): Performed by: EMERGENCY MEDICINE

## 2021-07-07 PROCEDURE — 6370000000 HC RX 637 (ALT 250 FOR IP): Performed by: PHYSICIAN ASSISTANT

## 2021-07-07 PROCEDURE — 96375 TX/PRO/DX INJ NEW DRUG ADDON: CPT

## 2021-07-07 RX ORDER — HYDROCODONE BITARTRATE AND ACETAMINOPHEN 5; 325 MG/1; MG/1
1 TABLET ORAL ONCE
Status: COMPLETED | OUTPATIENT
Start: 2021-07-07 | End: 2021-07-07

## 2021-07-07 RX ORDER — HYDROCODONE BITARTRATE AND ACETAMINOPHEN 5; 325 MG/1; MG/1
1 TABLET ORAL EVERY 6 HOURS PRN
Qty: 10 TABLET | Refills: 0 | Status: SHIPPED | OUTPATIENT
Start: 2021-07-07 | End: 2021-07-10

## 2021-07-07 RX ORDER — DIAZEPAM 5 MG/1
5 TABLET ORAL ONCE
Status: COMPLETED | OUTPATIENT
Start: 2021-07-07 | End: 2021-07-07

## 2021-07-07 RX ORDER — KETOROLAC TROMETHAMINE 30 MG/ML
15 INJECTION, SOLUTION INTRAMUSCULAR; INTRAVENOUS ONCE
Status: COMPLETED | OUTPATIENT
Start: 2021-07-07 | End: 2021-07-07

## 2021-07-07 RX ORDER — TIZANIDINE 4 MG/1
4 TABLET ORAL 3 TIMES DAILY
Qty: 21 TABLET | Refills: 0 | Status: SHIPPED | OUTPATIENT
Start: 2021-07-07 | End: 2021-07-14

## 2021-07-07 RX ORDER — CYCLOBENZAPRINE HCL 10 MG
10 TABLET ORAL ONCE
Status: COMPLETED | OUTPATIENT
Start: 2021-07-07 | End: 2021-07-07

## 2021-07-07 RX ADMIN — HYDROMORPHONE HYDROCHLORIDE 1 MG: 1 INJECTION, SOLUTION INTRAMUSCULAR; INTRAVENOUS; SUBCUTANEOUS at 15:49

## 2021-07-07 RX ADMIN — HYDROCODONE BITARTRATE AND ACETAMINOPHEN 1 TABLET: 5; 325 TABLET ORAL at 16:45

## 2021-07-07 RX ADMIN — HYDROMORPHONE HYDROCHLORIDE 1 MG: 1 INJECTION, SOLUTION INTRAMUSCULAR; INTRAVENOUS; SUBCUTANEOUS at 11:34

## 2021-07-07 RX ADMIN — KETOROLAC TROMETHAMINE 15 MG: 30 INJECTION, SOLUTION INTRAMUSCULAR; INTRAVENOUS at 11:34

## 2021-07-07 RX ADMIN — DIAZEPAM 5 MG: 5 TABLET ORAL at 16:04

## 2021-07-07 RX ADMIN — CYCLOBENZAPRINE 10 MG: 10 TABLET, FILM COATED ORAL at 11:33

## 2021-07-07 ASSESSMENT — PAIN DESCRIPTION - PAIN TYPE: TYPE: ACUTE PAIN

## 2021-07-07 ASSESSMENT — PAIN DESCRIPTION - FREQUENCY: FREQUENCY: CONTINUOUS

## 2021-07-07 ASSESSMENT — PAIN DESCRIPTION - ORIENTATION: ORIENTATION: RIGHT

## 2021-07-07 ASSESSMENT — PAIN SCALES - GENERAL
PAINLEVEL_OUTOF10: 2
PAINLEVEL_OUTOF10: 10
PAINLEVEL_OUTOF10: 10
PAINLEVEL_OUTOF10: 4
PAINLEVEL_OUTOF10: 10
PAINLEVEL_OUTOF10: 2

## 2021-07-07 ASSESSMENT — PAIN SCALES - WONG BAKER
WONGBAKER_NUMERICALRESPONSE: 2
WONGBAKER_NUMERICALRESPONSE: 2

## 2021-07-07 ASSESSMENT — ENCOUNTER SYMPTOMS
VOMITING: 0
NAUSEA: 0
SHORTNESS OF BREATH: 0
CHEST TIGHTNESS: 0
BACK PAIN: 0
ABDOMINAL PAIN: 0

## 2021-07-07 ASSESSMENT — PAIN DESCRIPTION - LOCATION: LOCATION: NECK

## 2021-07-07 ASSESSMENT — PAIN DESCRIPTION - DESCRIPTORS: DESCRIPTORS: PINS AND NEEDLES;SHOOTING

## 2021-07-07 NOTE — ED PROVIDER NOTES
ED Attending Attestation Note     Date of evaluation: 7/7/2021    This patient was seen by the advance practice provider. I have seen and examined the patient, agree with the workup, evaluation, management and diagnosis. The care plan has been discussed. My assessment reveals a 64-year-old woman with right radicular neck pain, with mild asymmetric weakness of the right upper extremity compared to the left upper extremity. Sensations are throughout the limb and the and the radial pulses intact.       Alberto Pabon MD  07/07/21 7267

## 2021-07-07 NOTE — FLOWSHEET NOTE
07/07/21 1430   Encounter Summary   Services provided to: Patient and family together   Referral/Consult From: [address] [facility] Family members   Continue Visiting   (7/7/21, KEENA. )   Complexity of Encounter Moderate   Length of Encounter 15 minutes   Routine   Type Initial   Assessment Calm; Approachable   Intervention Nurtured hope   Outcome Expressed gratitude

## 2021-07-07 NOTE — ED PROVIDER NOTES
dizziness, weakness, light-headedness and headaches. Psychiatric/Behavioral: Negative. All other systems reviewed and are negative. Past Medical, Surgical, Family, and Social History     She has a past medical history of Anxiety, Atrophic kidney, Cancer ([address] Cellulitis, Cervical disc herniation, Endometriosis, Lumbar disc disorder, Migraines, Narcolepsy, Nephrolithiasis, and Ovarian cyst.  She has a past surgical history that includes Tonsillectomy (2006); lumbar fusion (2008); laparoscopy; shoulder surgery; and Foot surgery (12/27/2012). Her family history includes Cancer in her mother and sister; Diabetes in her maternal aunt and sister; Hypertension in her father and sister. She reports that she has been smoking cigarettes. She has a 6.00 pack-year smoking history. She has never used smokeless tobacco. She reports current alcohol use. She reports that she does not use drugs. Medications     Previous Medications    ACETAMINOPHEN (TYLENOL) 500 MG TABLET    TAKE 2 TABLETS BY MOUTH EVERY 6 (SIX) HOURS AS NEEDED (FOR PAIN). AMITRIPTYLINE (ELAVIL) 50 MG TABLET    Take 50 mg by mouth nightly    CLINDAMYCIN-BENZOYL PER, REFR, 1.2-5 % GEL    Apply to the face once a day in the morning for acne. CLONAZEPAM (KLONOPIN) 1 MG TABLET    Take 1 mg by mouth nightly as needed. DIAZEPAM (VALIUM) 5 MG TABLET    TAKE 1 TABLET BY MOUTH EVERY DAY IN THE EVENING    L-LYSINE 500 MG TABS    Take 500 mg by mouth daily. MELATONIN 3 MG TABS TABLET    Take 3 mg by mouth daily    METHYLPHENIDATE (RITALIN) 10 MG TABLET    Take 10 mg by mouth 3 times daily. Allergies     She is allergic to codeine, morphine and related, percocet [oxycodone-acetaminophen], prednisone, and tetracyclines & related. Physical Exam     INITIAL VITALS: BP: (!) 170/99, Temp: 98.4 °F (36.9 °C), Pulse: 115, Resp: 16, SpO2: 100 %  Physical Exam  Vitals and nursing note reviewed.    Constitutional:       General: She is not in acute left upper and bilateral lower extremities. Psychiatric:         Mood and Affect: Mood normal.         Behavior: Behavior normal.         Thought Content: Thought content normal.         Judgment: Judgment normal.         Diagnostic Results         RADIOLOGY:  MRI CERVICAL SPINE WO CONTRAST   Final Result      Multilevel degenerative disc disease as described above by level. Posterior central and rightward paracentral disc protrusion at C5-C6, with mild cord effacement at this level. This in combination with predominantly right-sided degenerative arthropathy    results in severe right-sided foraminal stenosis at this level. No other acute findings in the cervical spine. LABS:   No results found for this visit on 07/07/21. RECENT VITALS:  BP: (!) 154/51, Temp: 98.4 °F (36.9 °C), Pulse: 77, Resp: 18, SpO2: 98 %     Procedures         ED Course     Nursing Notes, Past Medical Hx,Past Surgical Hx, Social Hx, Allergies, and Family Hx were reviewed. The patient was given the following medications:  Orders Placed This Encounter   Medications    ketorolac (TORADOL) injection 15 mg    HYDROmorphone (DILAUDID) injection 1 mg    cyclobenzaprine (FLEXERIL) tablet 10 mg    HYDROmorphone (DILAUDID) injection 1 mg    diazePAM (VALIUM) tablet 5 mg    HYDROcodone-acetaminophen (NORCO) 5-325 MG per tablet 1 tablet    HYDROcodone-acetaminophen (NORCO) 5-325 MG per tablet     Sig: Take 1 tablet by mouth every 6 hours as needed for Pain for up to 3 days. Intended supply: 3 days. Take lowest dose possible to manage pain     Dispense:  10 tablet     Refill:  0    tiZANidine (ZANAFLEX) 4 MG tablet     Sig: Take 1 tablet by mouth 3 times daily for 7 days     Dispense:  21 tablet     Refill:  0       CONSULTS:  IP CONSULT TO [address] [place] [address] / ASSESSMENT / Víctor Melony is a 49 y.o. female who presented to the emergency department with neck pain with radiculopathy.   On examination she did have some decreased strength and sensation along the right upper extremity. She was given a milligram of Dilaudid IV, Flexeril and Toradol which she tolerated well. She did have some relief for a period of time however when the pain began again she was given another milligram of Dilaudid followed by Valium for muscle spasming. Given the cervical radiculopathy I did order an MRI of the cervical spine. This shows multilevel degenerative disc disease. There is posterior central and rightward paracentral disc protrusion at C5-C6 with mild cord effacement at this level. Also noted was predominantly right-sided degenerative arthropathy with severe right-sided foraminal stenosis at that level. I discussed the patient with the nurse practitioner from neurosurgery [facility]. The patient is able to tolerate Norco therefore she will be discharged with a prescription for Norco as well as Zanaflex as he recommends this for the patient's symptoms. She will be followed up at the [facility] stated that they would call her tomorrow to help facilitate follow-up this week if possible. She is to return to the emergency department for worsening symptoms or concerns as discussed. The results were discussed with the patient as well as the care plan and she is in agreement and feels comfortable with this plan    This patient was also evaluated by the attending physician. All care plans were discussed and agreed upon. Clinical Impression     1. Neck pain    2. Cervical radiculopathy        Disposition     PATIENT REFERRED TO:  MD Rajeev [address]  [phone]    Call   As needed    [facility]  [phone]          DISCHARGE MEDICATIONS:  New Prescriptions    HYDROCODONE-ACETAMINOPHEN (NORCO) 5-325 MG PER TABLET    Take 1 tablet by mouth every 6 hours as needed for Pain for up to 3 days. Intended supply: 3 days.  Take lowest dose possible to manage pain
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
## 2021-07-26 ENCOUNTER — OFFICE VISIT (OUTPATIENT)
Dept: DERMATOLOGY | Age: 51
End: 2021-07-26
Payer: MEDICARE

## 2021-07-26 VITALS — TEMPERATURE: 98.2 F

## 2021-07-26 DIAGNOSIS — L57.0 ACTINIC KERATOSIS: ICD-10-CM

## 2021-07-26 DIAGNOSIS — F42.4 EXCORIATION (SKIN-PICKING) DISORDER: Primary | ICD-10-CM

## 2021-07-26 DIAGNOSIS — L70.9 ADULT ACNE: ICD-10-CM

## 2021-07-26 DIAGNOSIS — L82.1 SK (SEBORRHEIC KERATOSIS): ICD-10-CM

## 2021-07-26 PROCEDURE — 17003 DESTRUCT PREMALG LES 2-14: CPT | Performed by: DERMATOLOGY

## 2021-07-26 PROCEDURE — 3017F COLORECTAL CA SCREEN DOC REV: CPT | Performed by: DERMATOLOGY

## 2021-07-26 PROCEDURE — G8427 DOCREV CUR MEDS BY ELIG CLIN: HCPCS | Performed by: DERMATOLOGY

## 2021-07-26 PROCEDURE — G8417 CALC BMI ABV UP PARAM F/U: HCPCS | Performed by: DERMATOLOGY

## 2021-07-26 PROCEDURE — 99213 OFFICE O/P EST LOW 20 MIN: CPT | Performed by: DERMATOLOGY

## 2021-07-26 PROCEDURE — 4004F PT TOBACCO SCREEN RCVD TLK: CPT | Performed by: DERMATOLOGY

## 2021-07-26 PROCEDURE — 17000 DESTRUCT PREMALG LESION: CPT | Performed by: DERMATOLOGY

## 2021-07-26 RX ORDER — AMITRIPTYLINE HYDROCHLORIDE 50 MG/1
50 TABLET, FILM COATED ORAL NIGHTLY
Qty: 30 TABLET | Refills: 1 | Status: SHIPPED | OUTPATIENT
Start: 2021-07-26 | End: 2021-08-24

## 2021-07-26 NOTE — PROGRESS NOTES
[facility]  Marilou Cruz MD  [phone]      Candy Blake  1970    49 y.o. female     Date of Visit: 7/26/2021    Chief Complaint: skin lesions    History of Present Illness:    1. She returns today to follow up for lesions on the scalp. Feels/scratches the lesions. 2.  She also complains of adult acne - breaking out mainly on the lower face. Picks at the lesions to aid in healing. 3.  She also complains of an asymptomatic lesion on the left forearm. 4.  She complains of several persistent scaly lesions on the face. She was recently started on Lyrica 50 mg 3 times daily by her primary care physician - had to stop due to stomach upset. Review of Systems:  Gen: Feels well, good sense of health. Past Medical History, Family History, Surgical History, Medications and Allergies reviewed. Past Medical History:   Diagnosis Date    Anxiety     Atrophic kidney     left    Cancer (HCC)     skin (basal cell)    Cellulitis     recurrent    Cervical disc herniation     Endometriosis     Lumbar disc disorder 2/21/2012    Migraines     Narcolepsy     Nephrolithiasis 2/21/2012    Ovarian cyst 2/21/2012     Past Surgical History:   Procedure Laterality Date    FOOT SURGERY  12/27/2012    BROSTROM ANKLE LIGAMENT REPAIR LEFT, PARTIAL EXCISION OF BONE    LAPAROSCOPY      for endometriosis    LUMBAR FUSION  2008    SHOULDER SURGERY      left    TONSILLECTOMY  2006       Allergies   Allergen Reactions    Codeine Hives    Morphine And Related Other (See Comments) and Rash     ineffective      Percocet [Oxycodone-Acetaminophen] Itching    Prednisone     Tetracyclines & Related      Outpatient Medications Marked as Taking for the 7/26/21 encounter (Office Visit) with Latosha Hinojosa MD   Medication Sig Dispense Refill    acetaminophen (TYLENOL) 500 MG tablet TAKE 2 TABLETS BY MOUTH EVERY 6 (SIX) HOURS AS NEEDED (FOR PAIN).       diazePAM (VALIUM) 5 MG tablet TAKE 1 TABLET BY MOUTH EVERY DAY IN THE EVENING      methylphenidate (RITALIN) 10 MG tablet Take 10 mg by mouth 3 times daily.  amitriptyline (ELAVIL) 50 MG tablet Take 50 mg by mouth nightly      melatonin 3 MG TABS tablet Take 3 mg by mouth daily      L-Lysine 500 MG TABS Take 500 mg by mouth daily.  clonazepam (KLONOPIN) 1 MG tablet Take 1 mg by mouth nightly as needed. Physical Examination       Well appearing. 1.  Scalp and chin with excoriations. 2.  Chin with 2 excoriated erythematous papules. 3.  Left distal extensor forearm - stuck on appearing thin verrucous pink papule. 4.  Left upper lip - 2, left lower lip at the vermilion border - 1: few keratotic pink macules. Assessment and Plan     1. Excoriation (skin-picking) disorder     Avoid picking. 2. Adult acne with excoriations    Clean and Clear Persagel as needed for any new lesions. 3. SK (seborrheic keratosis)     Reassurance. 4. Actinic keratosis - 3    2 cycles of liquid nitrogen applied to 3 AKs: Left upper lip - 2, left lower lip at the vermilion border - 1. Patient was educated regarding the potential risks of blister formation and discomfort. Wound care was discussed. Return in about 6 months (around 1/26/2022).     --Brigida Cage MD

## 2021-08-05 DIAGNOSIS — G47.00 INSOMNIA, UNSPECIFIED TYPE: Primary | ICD-10-CM

## 2021-08-05 RX ORDER — CLONAZEPAM 1 MG/1
TABLET ORAL
Qty: 60 TABLET | Refills: 1 | Status: SHIPPED | OUTPATIENT
Start: 2021-08-05 | End: 2022-01-18 | Stop reason: SDUPTHER

## 2021-08-24 RX ORDER — AMITRIPTYLINE HYDROCHLORIDE 50 MG/1
TABLET, FILM COATED ORAL
Qty: 30 TABLET | Refills: 1 | Status: SHIPPED | OUTPATIENT
Start: 2021-08-24 | End: 2021-09-30

## 2021-09-01 ENCOUNTER — TELEPHONE (OUTPATIENT)
Dept: PULMONOLOGY | Age: 51
End: 2021-09-01

## 2021-09-01 NOTE — TELEPHONE ENCOUNTER
Pt. States you wanted her to get a specific COVID vac, but she can't remember which one just that it was a two shot deal. Please advise.

## 2021-09-13 DIAGNOSIS — G47.10 HYPERSOMNOLENCE: Primary | ICD-10-CM

## 2021-09-13 NOTE — TELEPHONE ENCOUNTER
Patient called the office requesting a refill on her adderall. Script pended for approval if appropriate.

## 2021-09-14 RX ORDER — METHYLPHENIDATE HYDROCHLORIDE 20 MG/1
10 TABLET ORAL 3 TIMES DAILY
Qty: 90 TABLET | Refills: 0 | Status: SHIPPED | OUTPATIENT
Start: 2021-09-14 | End: 2021-10-14

## 2021-10-01 RX ORDER — AMITRIPTYLINE HYDROCHLORIDE 50 MG/1
TABLET, FILM COATED ORAL
Qty: 30 TABLET | Refills: 2 | Status: SHIPPED | OUTPATIENT
Start: 2021-10-01 | End: 2022-01-18 | Stop reason: SDUPTHER

## 2021-10-14 DIAGNOSIS — G47.00 INSOMNIA, UNSPECIFIED TYPE: ICD-10-CM

## 2021-10-14 DIAGNOSIS — G47.10 HYPERSOMNOLENCE: ICD-10-CM

## 2021-10-14 RX ORDER — METHYLPHENIDATE HYDROCHLORIDE 20 MG/1
10 TABLET ORAL 3 TIMES DAILY
Qty: 90 TABLET | Refills: 0 | Status: CANCELLED | OUTPATIENT
Start: 2021-10-14 | End: 2021-11-13

## 2021-10-14 RX ORDER — CLONAZEPAM 1 MG/1
TABLET ORAL
Qty: 60 TABLET | Refills: 1 | Status: CANCELLED | OUTPATIENT
Start: 2021-10-14 | End: 2021-12-14

## 2021-10-18 ENCOUNTER — TELEPHONE (OUTPATIENT)
Dept: PULMONOLOGY | Age: 51
End: 2021-10-18

## 2021-10-18 DIAGNOSIS — R06.02 SHORTNESS OF BREATH: Primary | ICD-10-CM

## 2021-10-18 RX ORDER — METHYLPHENIDATE HYDROCHLORIDE 20 MG/1
20 TABLET ORAL 3 TIMES DAILY
Qty: 90 TABLET | Refills: 0 | Status: SHIPPED | OUTPATIENT
Start: 2021-10-18 | End: 2021-11-17

## 2021-10-18 RX ORDER — METHYLPHENIDATE HYDROCHLORIDE 20 MG/1
20 TABLET ORAL 3 TIMES DAILY
Qty: 90 TABLET | Refills: 0 | Status: SHIPPED | OUTPATIENT
Start: 2021-11-18 | End: 2021-12-18

## 2021-10-18 RX ORDER — CLONAZEPAM 1 MG/1
2 TABLET ORAL NIGHTLY PRN
Qty: 60 TABLET | Refills: 2 | Status: SHIPPED | OUTPATIENT
Start: 2021-10-18 | End: 2022-02-25

## 2021-10-18 RX ORDER — METHYLPHENIDATE HYDROCHLORIDE 20 MG/1
20 TABLET ORAL 3 TIMES DAILY
Qty: 90 TABLET | Refills: 0 | Status: SHIPPED | OUTPATIENT
Start: 2021-12-18 | End: 2022-01-17

## 2021-10-18 NOTE — TELEPHONE ENCOUNTER
Patient called the office stating that she has a lower lung that is collapsed. She had an MRI done last week although she couldn't tell me where she had it done, just keep telling me [facility]. She is upset that her refills for klonopin and adderall have not been approved by the provider as of yet. She wants to be seen ASAP. Where can we schedule patient?

## 2021-10-19 NOTE — TELEPHONE ENCOUNTER
The collapsed lung that was reported to the patient is of no significance. This is called atelectasis which is seen commonly in CT scans of the abdomen because the patient does not take a deep enough breath to allow for full expansion of the lungs. There is no reason to be concerned about it.

## 2021-10-19 NOTE — TELEPHONE ENCOUNTER
Spoke to pharmacy and they just missed the dose increase and did not have med in stock will have med by tomorrow for her. Pt. Advised of this.

## 2021-10-19 NOTE — TELEPHONE ENCOUNTER
Spoke to pt. And advised her of Dr. Flavio Merrill response. Pt. Now states that [facility] can't fill her Ritalin due to the class of Medication.

## 2021-10-21 ENCOUNTER — APPOINTMENT (OUTPATIENT)
Dept: GENERAL RADIOLOGY | Age: 51
End: 2021-10-21
Payer: MEDICARE

## 2021-10-21 ENCOUNTER — HOSPITAL ENCOUNTER (EMERGENCY)
Age: 51
Discharge: HOME | End: 2021-10-21
Attending: EMERGENCY MEDICINE
Payer: MEDICARE

## 2021-10-21 VITALS
OXYGEN SATURATION: 100 % | RESPIRATION RATE: 17 BRPM | DIASTOLIC BLOOD PRESSURE: 100 MMHG | HEART RATE: 71 BPM | TEMPERATURE: 98.2 F | SYSTOLIC BLOOD PRESSURE: 159 MMHG

## 2021-10-21 DIAGNOSIS — S61.213A LACERATION OF LEFT MIDDLE FINGER WITHOUT FOREIGN BODY WITHOUT DAMAGE TO NAIL, INITIAL ENCOUNTER: Primary | ICD-10-CM

## 2021-10-21 PROCEDURE — 12002 RPR S/N/AX/GEN/TRNK2.6-7.5CM: CPT

## 2021-10-21 PROCEDURE — 73130 X-RAY EXAM OF HAND: CPT

## 2021-10-21 PROCEDURE — 2500000003 HC RX 250 WO HCPCS: Performed by: STUDENT IN AN ORGANIZED HEALTH CARE EDUCATION/TRAINING PROGRAM

## 2021-10-21 PROCEDURE — 99282 EMERGENCY DEPT VISIT SF MDM: CPT

## 2021-10-21 RX ORDER — BACITRACIN ZINC AND POLYMYXIN B SULFATE 500; 1000 [USP'U]/G; [USP'U]/G
OINTMENT TOPICAL
Qty: 15 G | Refills: 1 | Status: SHIPPED | OUTPATIENT
Start: 2021-10-21 | End: 2021-10-28

## 2021-10-21 RX ORDER — LIDOCAINE HYDROCHLORIDE AND EPINEPHRINE 10; 10 MG/ML; UG/ML
20 INJECTION, SOLUTION INFILTRATION; PERINEURAL ONCE
Status: COMPLETED | OUTPATIENT
Start: 2021-10-21 | End: 2021-10-21

## 2021-10-21 RX ORDER — BACITRACIN ZINC AND POLYMYXIN B SULFATE 500; 1000 [USP'U]/G; [USP'U]/G
OINTMENT TOPICAL ONCE
Status: DISCONTINUED | OUTPATIENT
Start: 2021-10-21 | End: 2021-10-21 | Stop reason: HOSPADM

## 2021-10-21 RX ADMIN — LIDOCAINE HYDROCHLORIDE,EPINEPHRINE BITARTRATE 20 ML: 10; .01 INJECTION, SOLUTION INFILTRATION; PERINEURAL at 15:30

## 2021-10-21 ASSESSMENT — PAIN DESCRIPTION - DESCRIPTORS: DESCRIPTORS: THROBBING

## 2021-10-21 ASSESSMENT — PAIN SCALES - GENERAL: PAINLEVEL_OUTOF10: 5

## 2021-10-21 ASSESSMENT — PAIN DESCRIPTION - ORIENTATION: ORIENTATION: LEFT

## 2021-10-21 ASSESSMENT — PAIN DESCRIPTION - LOCATION: LOCATION: FINGER (COMMENT WHICH ONE)

## 2021-10-21 ASSESSMENT — PAIN DESCRIPTION - PAIN TYPE: TYPE: ACUTE PAIN

## 2021-10-21 NOTE — ED PROVIDER NOTES
ED Attending Attestation Note     Date of evaluation: 10/21/2021    This patient was seen by the resident. I have seen and examined the patient, agree with the workup, evaluation, management and diagnosis. The care plan has been discussed. Patient is a 27-year-old woman who presents to the emergency department after a accidental laceration of the palmar aspect of her distal left third finger. The patient was using a knife and excellently slipped to cause this injury. She denies any intentional harm. She denies any loss of sensation in the digit. Patient states her tetanus is up-to-date. On examination find a pleasant adult female, speaking in complete sentences, no increased work of breathing or accessory muscle use during respiration. On examination of her left hand I see a curvilinear laceration to the distal pad of her third digit on her left hand. Is hemostatic. We will proceed with x-ray, digital block and laceration repair. I was present for the laceration repair performed by the resident physician.     Colleen [place] MD MPH   Physician     Sherri Torres MD  10/21/21 1500

## 2021-10-21 NOTE — ED PROVIDER NOTES
[facility] RESIDENT NOTE       Date of evaluation: 10/21/2021    Chief Complaint     Laceration ([facility], the NP there told her she had only done sutures twice and could not promise how it would look so pt left and came here)      History of Present Illness     Yesenia Jordan is a 49 y.o. female with unremarkable past medical history who presents to the emergency department with a left third finger injury. Patient reports she was slicing vegetables when she sustained a fingertip injury from a knife and presented to urgent care. She was referred here for further evaluation and management. She complains of pain in the area of injury. Denies injuries elsewhere. Unsure of last tetanus shot. Not on any blood thinners, no bleeding disorders. Review of Systems     Please see HPI for pertinent positives and negatives. All other systems reviewed and negative. Past Medical, Surgical, Family, and Social History     She has a past medical history of Anxiety, Atrophic kidney, Cancer ([address] Cellulitis, Cervical disc herniation, Endometriosis, Lumbar disc disorder, Migraines, Narcolepsy, Nephrolithiasis, and Ovarian cyst.  She has a past surgical history that includes Tonsillectomy (2006); lumbar fusion (2008); laparoscopy; shoulder surgery; and Foot surgery (12/27/2012). Her family history includes Cancer in her mother and sister; Diabetes in her maternal aunt and sister; Hypertension in her father and sister. She reports that she has been smoking cigarettes. She has a 6.00 pack-year smoking history. She has never used smokeless tobacco. She reports current alcohol use. She reports that she does not use drugs. Medications     Previous Medications    ACETAMINOPHEN (TYLENOL) 500 MG TABLET    TAKE 2 TABLETS BY MOUTH EVERY 6 (SIX) HOURS AS NEEDED (FOR PAIN).     AMITRIPTYLINE (ELAVIL) 50 MG TABLET    TAKE 1 TABLET BY MOUTH EVERY DAY AT NIGHT CLONAZEPAM (KLONOPIN) 1 MG TABLET    Take 1 mg by mouth nightly as needed. CLONAZEPAM (KLONOPIN) 1 MG TABLET    Take 2 tablets by mouth at bedtime    CLONAZEPAM (KLONOPIN) 1 MG TABLET    Take 2 tablets by mouth nightly as needed (insomnia) for up to 30 days. DIAZEPAM (VALIUM) 5 MG TABLET    TAKE 1 TABLET BY MOUTH EVERY DAY IN THE EVENING    L-LYSINE 500 MG TABS    Take 500 mg by mouth daily. MELATONIN 3 MG TABS TABLET    Take 3 mg by mouth daily    METHYLPHENIDATE (RITALIN) 20 MG TABLET    Take 1 tablet by mouth 3 times daily for 30 days. METHYLPHENIDATE (RITALIN) 20 MG TABLET    Take 1 tablet by mouth 3 times daily for 30 days. METHYLPHENIDATE (RITALIN) 20 MG TABLET    Take 1 tablet by mouth 3 times daily for 30 days. Allergies     She is allergic to codeine, morphine and related, percocet [oxycodone-acetaminophen], prednisone, and tetracyclines & related. Physical Exam     INITIAL VITALS: BP: (!) 159/100, Temp: 98.2 °F (36.8 °C), Pulse: 71, Resp: 17, SpO2: 100 %   Physical Exam  Constitutional:       General: She is not in acute distress. Appearance: She is not ill-appearing. HENT:      Head: Normocephalic and atraumatic. Eyes:      Extraocular Movements: Extraocular movements intact. Pupils: Pupils are equal, round, and reactive to light. Cardiovascular:      Rate and Rhythm: Normal rate and regular rhythm. Pulses: Normal pulses. Pulmonary:      Effort: Pulmonary effort is normal.      Breath sounds: Normal breath sounds. Abdominal:      Palpations: Abdomen is soft. Tenderness: There is no abdominal tenderness. Musculoskeletal:         General: Normal range of motion. Skin:     General: Skin is warm. Comments: Avulsion injury of the pad of the fingertip without exposure of bone. Full range of motion of the distal interphalangeal joint. Neurological:      Mental Status: She is alert.            DiagnosticResults     EKG   None performed    RADIOLOGY:  XR HAND LEFT (MIN 3 VIEWS)   Final Result      Unremarkable study. No fracture          LABS:   No results found for this visit on 10/21/21. ED BEDSIDE ULTRASOUND:  None    RECENT VITALS:  BP: (!) 159/100, Temp: 98.2 °F (36.8 °C), Pulse: 71,Resp: 17, SpO2: 100 %     Procedures     Lac Repair    Date/Time: 10/21/2021 4:30 PM  Performed by: Pastor Haddad MD  Authorized by: Alysia Berger MD     Consent:     Consent obtained:  Verbal    Risks discussed:  Infection, need for additional repair, poor wound healing and pain    Alternatives discussed:  No treatment  Anesthesia (see MAR for exact dosages): Anesthesia method:  Nerve block    Block location:  Digital    Block needle gauge:  25 G    Block anesthetic:  Lidocaine 1% WITH epi    Block technique:  Dorsal    Block injection procedure:  Anatomic landmarks identified, introduced needle, incremental injection and negative aspiration for blood    Block outcome:  Anesthesia achieved  Laceration details:     Location:  Finger    Finger location:  L long finger    Length (cm):  3  Pre-procedure details:     Preparation:  Patient was prepped and draped in usual sterile fashion and imaging obtained to evaluate for foreign bodies  Exploration:     Hemostasis achieved with:  Tourniquet    Wound exploration: entire depth of wound probed and visualized    Treatment:     Area cleansed with:  Saline    Amount of cleaning:  Standard    Irrigation solution:  Sterile saline    Irrigation volume:  500 ml  Skin repair:     Repair method:  Sutures    Suture size:  5-0    Suture material:  Prolene  Approximation:     Approximation:  Close  Post-procedure details:     Dressing:  Antibiotic ointment and sterile dressing    Patient tolerance of procedure: Tolerated well, no immediate complications        ED Course     Nursing Notes, Past Medical Hx, Past Surgical Hx, Social Hx, Allergies, and Family Hx were reviewed.     The patient was given the followingmedications:  Orders Placed This Encounter   Medications    lidocaine-EPINEPHrine 1 %-1:720006 injection 20 mL    bacitracin-polymyxin b (POLYSPORIN) ointment    bacitracin-polymyxin b (POLYSPORIN) 500-51021 UNIT/GM ointment     Sig: Apply topically daily. Dispense:  15 g     Refill:  1       CONSULTS:  None    MEDICAL DECISION MAKING / ASSESSMENT / Isak Álvaro is a 49 y.o. female who presents to the emergency department with a fingertip laceration. Imaging was obtained without fracture and no observable bone was noted on inspection of the fingertip. Avulsion injury appeared well perfused, therefore wound was reapproximated with suture; please see procedure note for full details. Wound was dressed with antibiotic ointment and patient was given wound care instructions as well as signs and symptoms of infection. Tetanus status is up-to-date. She was encouraged to follow-up with her primary care physician for suture removal.  Patient was discharged in stable condition with return precautions. This patient was also evaluated by the attending physician. All care plans werediscussed and agreed upon. Clinical Impression     1. Laceration of left middle finger without foreign body without damage to nail, initial encounter        Disposition     PATIENT REFERRED TO:  Raquel Guevara MD  [facility]  [phone]    Schedule an appointment as soon as possible for a visit in 5 days        DISCHARGE MEDICATIONS:  New Prescriptions    BACITRACIN-POLYMYXIN B (POLYSPORIN) 500-72581 UNIT/GM OINTMENT    Apply topically daily.        DISPOSITION Decision To Discharge 10/21/2021 04:26:01 PM      Namrata Delgadillo MD  Resident  10/21/21 [id number]

## 2021-11-19 RX ORDER — SELENIUM SULFIDE 2.5 MG/100ML
LOTION TOPICAL
Qty: 120 ML | Refills: 5 | Status: SHIPPED | OUTPATIENT
Start: 2021-11-19

## 2022-01-04 ENCOUNTER — TELEPHONE (OUTPATIENT)
Dept: PULMONOLOGY | Age: 52
End: 2022-01-04

## 2022-01-04 NOTE — TELEPHONE ENCOUNTER
Office cancelled appointment on 1/10/22 with Dr. Rui Charles for 6 mo DARIEL fu.  LM on 12/28/21 that appt would need to be cancelled and we would call back to r/s. Reason: Provider not in office    Patient did not reschedule appointment.     Appointment rescheduled:  Pt will need to be called once schedule opens

## 2022-01-18 DIAGNOSIS — G47.10 HYPERSOMNOLENCE: Primary | ICD-10-CM

## 2022-01-18 DIAGNOSIS — G47.00 INSOMNIA, UNSPECIFIED TYPE: ICD-10-CM

## 2022-01-19 RX ORDER — CLONAZEPAM 1 MG/1
TABLET ORAL
Qty: 60 TABLET | Refills: 3 | Status: SHIPPED | OUTPATIENT
Start: 2022-01-19 | End: 2022-06-08 | Stop reason: SDUPTHER

## 2022-01-19 RX ORDER — METHYLPHENIDATE HYDROCHLORIDE 20 MG/1
20 TABLET ORAL 3 TIMES DAILY
Qty: 90 TABLET | Refills: 0 | Status: SHIPPED | OUTPATIENT
Start: 2022-02-19 | End: 2022-03-21

## 2022-01-19 RX ORDER — METHYLPHENIDATE HYDROCHLORIDE 20 MG/1
20 TABLET ORAL 3 TIMES DAILY
Qty: 90 TABLET | Refills: 0 | Status: SHIPPED | OUTPATIENT
Start: 2022-01-19 | End: 2022-02-18

## 2022-01-19 RX ORDER — AMITRIPTYLINE HYDROCHLORIDE 50 MG/1
TABLET, FILM COATED ORAL
Qty: 30 TABLET | Refills: 3 | Status: SHIPPED | OUTPATIENT
Start: 2022-01-19 | End: 2022-09-14

## 2022-02-01 ENCOUNTER — OFFICE VISIT (OUTPATIENT)
Dept: DERMATOLOGY | Age: 52
End: 2022-02-01
Payer: MEDICARE

## 2022-02-01 VITALS — TEMPERATURE: 99 F

## 2022-02-01 DIAGNOSIS — L70.9 ADULT ACNE: Primary | ICD-10-CM

## 2022-02-01 DIAGNOSIS — L57.0 ACTINIC KERATOSIS: ICD-10-CM

## 2022-02-01 DIAGNOSIS — D48.5 NEOPLASM OF UNCERTAIN BEHAVIOR OF SKIN: ICD-10-CM

## 2022-02-01 PROCEDURE — G8484 FLU IMMUNIZE NO ADMIN: HCPCS | Performed by: DERMATOLOGY

## 2022-02-01 PROCEDURE — G8417 CALC BMI ABV UP PARAM F/U: HCPCS | Performed by: DERMATOLOGY

## 2022-02-01 PROCEDURE — 99212 OFFICE O/P EST SF 10 MIN: CPT | Performed by: DERMATOLOGY

## 2022-02-01 PROCEDURE — 11103 TANGNTL BX SKIN EA SEP/ADDL: CPT | Performed by: DERMATOLOGY

## 2022-02-01 PROCEDURE — G8427 DOCREV CUR MEDS BY ELIG CLIN: HCPCS | Performed by: DERMATOLOGY

## 2022-02-01 PROCEDURE — 3017F COLORECTAL CA SCREEN DOC REV: CPT | Performed by: DERMATOLOGY

## 2022-02-01 PROCEDURE — 17000 DESTRUCT PREMALG LESION: CPT | Performed by: DERMATOLOGY

## 2022-02-01 PROCEDURE — 11102 TANGNTL BX SKIN SINGLE LES: CPT | Performed by: DERMATOLOGY

## 2022-02-01 PROCEDURE — 4004F PT TOBACCO SCREEN RCVD TLK: CPT | Performed by: DERMATOLOGY

## 2022-02-01 NOTE — PROGRESS NOTES
30 days. 90 tablet 0    selenium sulfide (SELSUN) 2.5 % lotion DISPENSE SHAMPOO. USE TO [address] THE HAIR 3 TIMES WEEKLY. LEAVE ON SCALP FOR 5 MINUTES PRIOR TO RINSING 120 mL 5    acetaminophen (TYLENOL) 500 MG tablet TAKE 2 TABLETS BY MOUTH EVERY 6 (SIX) HOURS AS NEEDED (FOR PAIN).  diazePAM (VALIUM) 5 MG tablet TAKE 1 TABLET BY MOUTH EVERY DAY IN THE EVENING      melatonin 3 MG TABS tablet Take 3 mg by mouth daily      clonazepam (KLONOPIN) 1 MG tablet Take 1 mg by mouth nightly as needed. Physical Examination       The following were examined and determined to be normal: Psych/Neuro, Scalp/hair, Conjunctivae/eyelids, Gums/teeth/lips, Neck, Abdomen, Back, RUE, RLE and Nails/digits. The following were examined and determined to be abnormal: Head/face, Breast/axilla/chest, LUE and LLE. Well appearing. 1.  Chin with 2 excoriated pink papules. 2.  Left medial lower leg - 1 keratotic pink macule. 3.    A. Left extensor forearm - 9 mm keratotic pink papule. B. Left posterior lower leg - 6 mm scaly erythematous macule. C.  Right upper chest - 1 cm oval shaped scaly pink tan patch. Assessment and Plan     1. Adult acne excoriee    OTC benzoyl peroxide gel daily as needed for spot treatment. 2. Actinic keratosis     2 cycles of liquid nitrogen applied to 1 AK on the left medial lower leg. Patient was educated regarding the potential risks of blister formation and discomfort. Wound care was discussed. 3. Neoplasm of uncertain behavior of skin,   A. Left forearm - r/o SCC  B. Left posterior lower leg - r/o BCC  C. Right upper chest - Mariangel vs lichenoid keratosis    Discussed possible diagnosis; patient agreeable to biopsies (consent obtained). Risks reviewed including discomfort, bleeding, scar and infection. The area(s) to be biopsied were marked with a surgical pen. Alcohol was used to cleanse the site.  Local anesthesia was acheived with 1% lidocaine with epinephrine. Shave biopsy was performed x 3 using a razor blade. Hemostasis was achieved with aluminum chloride. The wound(s) were dressed with petrolatum and covered with a bandage. Wound care instructions were reviewed. 3 Specimen (s) sent to pathology. The specimen bottles were appropriately labeled. Return in about 6 months (around 8/1/2022).     --Candace Whitt MD

## 2022-02-01 NOTE — PATIENT INSTRUCTIONS
Biopsy Wound Care Instructions    · Keep the bandage in place for 24 hours. · Cleanse the wound with mild soapy water daily   Gently dry the area.  Apply Vaseline or petroleum jelly to the wound using a cotton tipped applicator.  Cover with a clean bandage.  Repeat this process until the biopsy site is healed.  If you had stitches placed, continue treating the site until the stitches are removed. Remember to make an appointment to return to have your stitches removed by our staff.  You may shower and bathe as usual.       ** Biopsy results generally take around 7 business days to come back. If you have not heard from us by then, please call the office at [phone]. *Please note that biopsy results are released to both the patient and physician at the same time in [address] Please allow time for your physician to review the results. One of our staff members will reach out to you with the results and plan.

## 2022-02-07 LAB — DERMATOLOGY PATHOLOGY REPORT: ABNORMAL

## 2022-02-25 ENCOUNTER — PROCEDURE VISIT (OUTPATIENT)
Dept: DERMATOLOGY | Age: 52
End: 2022-02-25
Payer: MEDICARE

## 2022-02-25 VITALS — TEMPERATURE: 97.3 F

## 2022-02-25 DIAGNOSIS — C44.719 BASAL CELL CARCINOMA (BCC) OF LEFT LOWER LEG: Primary | ICD-10-CM

## 2022-02-25 PROCEDURE — 17261 DSTRJ MAL LES T/A/L .6-1.0CM: CPT | Performed by: DERMATOLOGY

## 2022-02-25 NOTE — PROGRESS NOTES
[facility]  Shae Park MD  [phone]      Huong Adames  1970    47 y.o. female     Date of Visit: 2/25/2022    Chief Complaint: [address]    History of Present Illness:    Here today for treatment of a BCC on the left leg. B. LEFT POSTERIOR LOWER LEG-     Basal Cell Carcinoma, nodular and superficial        Review of Systems:  Gen: Feels well, good sense of health. Past Medical History, Family History, Surgical History, Medications and Allergies reviewed. Past Medical History:   Diagnosis Date    Anxiety     Atrophic kidney     left    Cancer (HCC)     skin (basal cell)    Cellulitis     recurrent    Cervical disc herniation     Endometriosis     Lumbar disc disorder 2/21/2012    Migraines     Narcolepsy     Nephrolithiasis 2/21/2012    Ovarian cyst 2/21/2012     Past Surgical History:   Procedure Laterality Date    FOOT SURGERY  12/27/2012    BROSTROM ANKLE LIGAMENT REPAIR LEFT, PARTIAL EXCISION OF BONE    LAPAROSCOPY      for endometriosis    LUMBAR FUSION  2008    SHOULDER SURGERY      left    TONSILLECTOMY  2006       Allergies   Allergen Reactions    Codeine Hives    Morphine And Related Other (See Comments) and Rash     ineffective      Percocet [Oxycodone-Acetaminophen] Itching    Prednisone     Tetracyclines & Related      Outpatient Medications Marked as Taking for the 2/25/22 encounter (Procedure visit) with Susannah Wells MD   Medication Sig Dispense Refill    amitriptyline (ELAVIL) 50 MG tablet TAKE 1 TABLET BY MOUTH EVERY DAY AT NIGHT 30 tablet 3    clonazePAM (KLONOPIN) 1 MG tablet Take 2 tablets by mouth at bedtime 60 tablet 3    methylphenidate (RITALIN) 20 MG tablet Take 1 tablet by mouth 3 times daily for 30 days. 90 tablet 0    selenium sulfide (SELSUN) 2.5 % lotion DISPENSE SHAMPOO. USE TO [facility] THE HAIR 3 TIMES WEEKLY.  LEAVE ON SCALP FOR 5 MINUTES PRIOR TO RINSING 120 mL 5    acetaminophen (TYLENOL) 500 MG tablet TAKE 2 TABLETS BY MOUTH EVERY 6 (SIX) HOURS AS NEEDED (FOR PAIN).  diazePAM (VALIUM) 5 MG tablet TAKE 1 TABLET BY MOUTH EVERY DAY IN THE EVENING      melatonin 3 MG TABS tablet Take 3 mg by mouth daily      clonazepam (KLONOPIN) 1 MG tablet Take 1 mg by mouth nightly as needed. Physical Examination     Well appearing. 1.  Posterior aspect of the left leg - 8 mm round pink macule with focal crusted. Assessment and Plan     1. Nodular and superficial basal cell carcinoma (BCC) of left lower leg - 8 mm    The area to be treated with cleansed with alcohol and marked with surgical marking pen. Local anesthesia was acheived with 1% lidocaine with epinephrine. Sharp curettage was performed in multiple directions followed 3 times by electrodessication. Hemostasis was obtained with aluminum chloride. The wound was dressed with petrolatum and a bandage. Patient was educated regarding risks including bleeding, discomfort, and risk of recurrence.           --Cherie Norman MD

## 2022-02-25 NOTE — PATIENT INSTRUCTIONS
For your well being, we encourage you to stop smoking or using tobacco products. Smoking and the use of other tobacco products, including cigars and smokeless tobacco, causes or worsens numerous diseases and conditions. Some products also expose nearby people to toxic secondhand smoke. Smoking is the leading cause of preventable death in the [place] causing over 438,000 deaths per year. Secondhand smoke is a serious health hazard for people of all ages, causing more than 41,000 deaths each year. Marijuana smoke contains many of the same toxins, irritants and carcinogens as tobacco smoke. Electronic cigarettes are a new tobacco product, and the potential health consequences and safety of these products are unknown. Smokeless Tobacco products are a known cause of cancer, and are not a safe alternative to cigarettes. 1. Make the decision to quit smoking  Stopping smoking is the best thing you can do for your health. If you smoke, you are more likely to get diseases of the lungs, heart, and brain. You are also more likely to get many kinds of cancer. After you quit, you will be less likely to get these diseases. Stopping smoking is hard--but you can do it! Your doctor can help you. 2. Get ready to quit  Once you decide to quit, make a plan with the help of your doctor. Here are some things you need to do:  Choose a day to quit. Talk to your primary care doctor about using the nicotine patch, gum, inhaler, or nasal spray to help you quit smoking. Getting nicotine some way other than in a cigarette can help make quitting easier. Talk to your primary care doctor about using a prescription medicine like bupropion (brand name: Zyban) to reduce your urge to smoke. If you decide to use a medicine, start taking it two weeks before your quit day. Talk with your primary care doctor about when you smoke. For example, you may smoke first thing in the morning, after a meal, or when you feel stressed.  Plan what you will do instead of smoking. Tell your family and friends that you are going to try to quit and on what date. Put together a list of phone numbers of friends and family members who can give you support when you feel you might break down and have a cigarette. Before your quit day, put all tobacco products, ashtrays, and lighters away. 3. Put your plan into action  When you wake up on your quit day, start using a nicotine replacement method if you had planned to do that. For the first few days after you quit, you may have some signs of nicotine withdrawal. You may feel restless and cranky. You may find it hard to think. You might need to change your dose of nicotine if these signs upset you. Do not smoke! If you feel like you want to smoke, call a friend or family member who has agreed to help you. Also, there might be someone in your doctor's office you can call. Put into action your plans for doing things other than smoking. For example, when you feel the urge to smoke, you might take a walk. Or, you might visit friends who do not smoke. It is best to stay away from places where you used to smoke. 4. If you return to smoking--  Quitting smoking is not easy. Many people have to try several times before they succeed. If you start smoking again, call your primary care doctor's office soon to talk about what happened. Think about what you can do to keep from smoking when you try to quit again. Part of this handout is provided by the [employer]. More information is available at the American Lung Association https://flores.com/.  This information provides a general overview and may not apply to everyone. Talk to your primary care doctor to find out if this information applies to you and to get more information on this subject. Wound Care Instructions    · Keep the bandage in place for 24 hours.    · Cleanse the wound with mild soapy water daily   Gently

## 2022-04-15 ENCOUNTER — OFFICE VISIT (OUTPATIENT)
Dept: PULMONOLOGY | Age: 52
End: 2022-04-15
Payer: MEDICARE

## 2022-04-15 VITALS
DIASTOLIC BLOOD PRESSURE: 86 MMHG | HEART RATE: 97 BPM | BODY MASS INDEX: 36.83 KG/M2 | RESPIRATION RATE: 16 BRPM | SYSTOLIC BLOOD PRESSURE: 132 MMHG | HEIGHT: 68 IN | WEIGHT: 243 LBS | OXYGEN SATURATION: 99 % | TEMPERATURE: 99.5 F

## 2022-04-15 DIAGNOSIS — Z72.0 TOBACCO ABUSE: ICD-10-CM

## 2022-04-15 DIAGNOSIS — G47.411 CATAPLEXY AND NARCOLEPSY: Primary | ICD-10-CM

## 2022-04-15 DIAGNOSIS — G47.33 OSA (OBSTRUCTIVE SLEEP APNEA): ICD-10-CM

## 2022-04-15 DIAGNOSIS — R06.02 SHORTNESS OF BREATH: ICD-10-CM

## 2022-04-15 DIAGNOSIS — G47.10 HYPERSOMNOLENCE: ICD-10-CM

## 2022-04-15 DIAGNOSIS — G47.61 PLMD (PERIODIC LIMB MOVEMENT DISORDER): ICD-10-CM

## 2022-04-15 DIAGNOSIS — G47.52 RBD (REM BEHAVIORAL DISORDER): ICD-10-CM

## 2022-04-15 PROCEDURE — 3017F COLORECTAL CA SCREEN DOC REV: CPT | Performed by: INTERNAL MEDICINE

## 2022-04-15 PROCEDURE — 1036F TOBACCO NON-USER: CPT | Performed by: INTERNAL MEDICINE

## 2022-04-15 PROCEDURE — G8427 DOCREV CUR MEDS BY ELIG CLIN: HCPCS | Performed by: INTERNAL MEDICINE

## 2022-04-15 PROCEDURE — G8417 CALC BMI ABV UP PARAM F/U: HCPCS | Performed by: INTERNAL MEDICINE

## 2022-04-15 PROCEDURE — 99214 OFFICE O/P EST MOD 30 MIN: CPT | Performed by: INTERNAL MEDICINE

## 2022-04-15 RX ORDER — METHYLPHENIDATE HYDROCHLORIDE 10 MG/1
10 TABLET ORAL 2 TIMES DAILY
COMMUNITY
End: 2022-07-07 | Stop reason: CLARIF

## 2022-04-15 RX ORDER — METHYLPHENIDATE HYDROCHLORIDE 20 MG/1
20 TABLET ORAL 3 TIMES DAILY
Qty: 90 TABLET | Refills: 0 | Status: SHIPPED | OUTPATIENT
Start: 2022-06-15 | End: 2022-07-15

## 2022-04-15 RX ORDER — AMITRIPTYLINE HYDROCHLORIDE 50 MG/1
50 TABLET, FILM COATED ORAL NIGHTLY
Qty: 90 TABLET | Refills: 1 | Status: SHIPPED | OUTPATIENT
Start: 2022-04-15 | End: 2022-07-07 | Stop reason: CLARIF

## 2022-04-15 RX ORDER — METHYLPHENIDATE HYDROCHLORIDE 20 MG/1
20 TABLET ORAL 3 TIMES DAILY
Qty: 90 TABLET | Refills: 0 | Status: SHIPPED | OUTPATIENT
Start: 2022-04-15 | End: 2022-05-15

## 2022-04-15 RX ORDER — METHYLPHENIDATE HYDROCHLORIDE 20 MG/1
20 TABLET ORAL 3 TIMES DAILY
Qty: 90 TABLET | Refills: 0 | Status: SHIPPED | OUTPATIENT
Start: 2022-05-15 | End: 2022-06-14

## 2022-04-15 ASSESSMENT — ENCOUNTER SYMPTOMS
ORTHOPNEA: 0
WHEEZING: 0
EYES NEGATIVE: 1
HEMOPTYSIS: 0
ABDOMINAL PAIN: 0
GASTROINTESTINAL NEGATIVE: 1
SORE THROAT: 0
RHINORRHEA: 0
ALLERGIC/IMMUNOLOGIC NEGATIVE: 1
SPUTUM PRODUCTION: 0
VOMITING: 0
SHORTNESS OF BREATH: 1
SWOLLEN GLANDS: 0

## 2022-04-15 NOTE — LETTER
4/15/22        Blanca Mccall      I have seen this patient in the office today and wanted to communicate my findings and recommendations. Patient Instructions     ASSESSMENT/PLAN:   Diagnosis Orders   1. Cataplexy and narcolepsy  Home Sleep Study   2. Hypersomnolence  Home Sleep Study   3. RBD (REM behavioral disorder)     4. PLMD (periodic limb movement disorder)     5. Tobacco abuse  Full PFT Study With Bronchodilator    Bronchial Challenge   6. Shortness of breath  Full PFT Study With Bronchodilator    Bronchial Challenge    Echo 2d w doppler w color w contrast   7. DARIEL (obstructive sleep apnea)  Home Sleep Study     OF NOTE: Stephanie narcolepsy is very severe and has lead to be on social security and incapable of self support     Still having RBD a, crying and dreaming more at night, now also having walking out of the bedroom at night at times, fell down steps     Having cataplexy, still about the same, getting this about 2-3 times a day     Tried and failed with: nortryptiline and xyrem (tried 2 times)   Tried and failed with Wakix, had side effects, adderall, vyvanse,   Had reaction now a side effect of tongue drying  Adderall 30 mg twice a day    Weight is at 243 from  200 from 191 and was 216 coming down       Continue with:  Klonopin 2 mg at night   Melatonin 20 mg at night  Amitriptyline 50 mg at night    Went back to :  Ritalin 20 mg Three times- some back off    DARIEL  Increased blood pressure   Gained weight  More sleepy  Neck size is 16   ESS is 15/24  Will get  HST  I will call with results        Stress is a trigger    Now having cataplexy at about 3 times a day  And the arms are very hard to move    Stop smoking- stopped 2 weeks ago      LDCT scan done 3/1/22     No suspicious nodules. No acute thoracic findings. Areas of mild scattered atelectasis/scarring throughout the lungs. LUNG-RADS CATEGORY:   1: No nodules or definitely benign nodules.  (Nodule/s with specific calcifications: complete, central, popcorn, concentric rings and fat containing nodules)         MODIFIER: None     NODULE RECOMMENDATION:  Category 1 or 2: Continue annual low dose CT lung cancer screening.  Patient should return in one year for a follow-up exam         covid vaccination x 2,       Shortness of breath  Will get  PFT and methacholine challenge  Echo of the heart b/c of issus of long term amphetamine and dizziness and shortness breath.         RTC in 3 months                             Thank you for allowing me to assist in the care of the [facility], MD

## 2022-04-15 NOTE — PROGRESS NOTES
MA Communication:   The following orders are received by verbal communication from Cynthia Clarke MD    Orders include:    PFT/Methacoline   HST  ECHO  3 MONTH F/U

## 2022-04-15 NOTE — PATIENT INSTRUCTIONS
ASSESSMENT/PLAN:   Diagnosis Orders   1. Cataplexy and narcolepsy  Home Sleep Study   2. Hypersomnolence  Home Sleep Study   3. RBD (REM behavioral disorder)     4. PLMD (periodic limb movement disorder)     5. Tobacco abuse  Full PFT Study With Bronchodilator    Bronchial Challenge   6. Shortness of breath  Full PFT Study With Bronchodilator    Bronchial Challenge    Echo 2d w doppler w color w contrast   7. DARIEL (obstructive sleep apnea)  Home Sleep Study     OF NOTE: Stephanie narcolepsy is very severe and has lead to be on social security and incapable of self support     Still having RBD a, crying and dreaming more at night, now also having walking out of the bedroom at night at times, fell down steps     Having cataplexy, still about the same, getting this about 2-3 times a day     Tried and failed with: nortryptiline and xyrem (tried 2 times)   Tried and failed with Wakix, had side effects, adderall, vyvanse,   Had reaction now a side effect of tongue drying  Adderall 30 mg twice a day    Weight is at 243 from  200 from 191 and was 216 coming down       Continue with:  Klonopin 2 mg at night   Melatonin 20 mg at night  Amitriptyline 50 mg at night    Went back to :  Ritalin 20 mg Three times- some back off    DARIEL  Increased blood pressure   Gained weight  More sleepy  Neck size is 16   ESS is 15/24  Will get  HST  I will call with results        Stress is a trigger    Now having cataplexy at about 3 times a day  And the arms are very hard to move    Stop smoking- stopped 2 weeks ago      LDCT scan done 3/1/22     No suspicious nodules. No acute thoracic findings. Areas of mild scattered atelectasis/scarring throughout the lungs. LUNG-RADS CATEGORY:   1: No nodules or definitely benign nodules.  (Nodule/s with specific calcifications: complete, central, popcorn, concentric rings and fat containing nodules)         MODIFIER: None     NODULE RECOMMENDATION:  Category 1 or 2: Continue annual low dose CT lung cancer screening. Patient should return in one year for a follow-up exam         covid vaccination x 2,       Shortness of breath  Will get  PFT and methacholine challenge  Echo of the heart b/c of issus of long term amphetamine and dizziness and shortness breath.         RTC in 3 months          Remember to bring a list of pulmonary medications and any CPAP or BiPAP machines to your next appointment with the office. Please keep all of your future appointments scheduled by [facility] [address], [facility] office. Out of respect for other patients and providers, you may be asked to reschedule your appointment if you arrive later than your scheduled appointment time. Appointments cancelled less than 24hrs in advance will be considered a no show. Patients with three missed appointments within 1 year or four missed appointments within 2 years can be dismissed from the practice. Please be aware that our physicians are required to work in the Intensive Care Unit at [facility].  Your appointment may need to be rescheduled if they are designated to work during your appointment time. You may receive a survey regarding the care you received during your visit. Your input is valuable to us. We encourage you to complete and return your survey. We hope you will choose us in the future for your healthcare needs. Pt instructed of all future appointment dates & times, including radiology, labs, procedures & referrals. If procedures were scheduled preparation instructions provided. Instructions on future appointments with [facility] were given. Sleep center will call to schedule you sleep study.  If you have any question their phone  Number is [phone]

## 2022-04-15 NOTE — PROGRESS NOTES
Mikel Vidales (: 1970 ) is a 47 y.o. female here for an evaluation of   Chief Complaint   Patient presents with    Sleep Apnea     Narcolepsy         ASSESSMENT/PLAN:   Diagnosis Orders   1. Cataplexy and narcolepsy  Home Sleep Study   2. Hypersomnolence  Home Sleep Study   3. RBD (REM behavioral disorder)     4. PLMD (periodic limb movement disorder)     5. Tobacco abuse  Full PFT Study With Bronchodilator    Bronchial Challenge   6. Shortness of breath  Full PFT Study With Bronchodilator    Bronchial Challenge    Echo 2d w doppler w color w contrast   7. DARIEL (obstructive sleep apnea)  Home Sleep Study     OF NOTE: Stephanie narcolepsy is very severe and has lead to be on social security and incapable of self support     Still having RBD a, crying and dreaming more at night, now also having walking out of the bedroom at night at times, fell down steps     Having cataplexy, still about the same, getting this about 2-3 times a day     Tried and failed with: nortryptiline and xyrem (tried 2 times)   Tried and failed with Wakix, had side effects, adderall, vyvanse,   Had reaction now a side effect of tongue drying  Adderall 30 mg twice a day    Weight is at 243 from  200 from 191 and was 216 coming down       Continue with:  Klonopin 2 mg at night   Melatonin 20 mg at night  Amitriptyline 50 mg at night    Went back to :  Ritalin 20 mg Three times- some back off    DARIEL  Increased blood pressure   Gained weight  More sleepy  Neck size is 16   ESS is 15/24  Will get  HST  I will call with results        Stress is a trigger    Now having cataplexy at about 3 times a day  And the arms are very hard to move    Stop smoking- stopped 2 weeks ago      LDCT scan done 3/1/22     No suspicious nodules. No acute thoracic findings. Areas of mild scattered atelectasis/scarring throughout the lungs. LUNG-RADS CATEGORY:   1: No nodules or definitely benign nodules.  (Nodule/s with specific calcifications: complete, central, popcorn, concentric rings and fat containing nodules)         MODIFIER: None     NODULE RECOMMENDATION:  Category 1 or 2: Continue annual low dose CT lung cancer screening. Patient should return in one year for a follow-up exam         covid vaccination x 2,       Shortness of breath  Will get  PFT and methacholine challenge  Echo of the heart b/c of issus of long term amphetamine and dizziness and shortness breath.         RTC in 3 months                  No follow-ups on file. I have personally reviewed and summarized the old records and/or obtained further history from someone other than the patient. I have had a discussion with another health care provider    I have independently reviewed the images and reviewed with patient    I have reviewed the lab tests, radiology reports and medications      Will ask for old records     Patient understands that smoking is aggravating the respiratory disease and must be discontinued.  Patient refuses to d/c smoking      SUBJECTIVE/OBJECTIVE:    Last seen at [facility] 5/24/2021  Transfer of care from [facility] to [address] [place] [address]  Initially seen at [address] [place] [address] pulmonary and sleep on 4/15/2022      sleepienss is the same    Having more issues with grinding of the teeth  Not able to use cpap    Klonopin 2mg     Still having RBD at night, talking in the sleep and moving    Had tetnus and hepatitis A shot  Having some dizziness    Despite the   Klonopin 2 mg has reduced this to 1 mg   And melatonin 20 mg  And still acting and dreaming     Now having more cataplexy with stress falling  And the arms are very hard to move    Also taking tramadol    Tried to back off the klonopin and then had issues  So will have to stay at 2 mg at night    Taking     Now having more issues with RBD  Having screamming episodes of RBD at night at 2-3 am        Since last visit  New things    Sob, has been going on for some time  No chest pain  No bloating     No wheezing      Also increased pressure blood pressure  ? snore    No going thru menopause  Gained weight    Shortness of Breath  This is a chronic problem. The current episode started more than 1 month ago. The problem occurs intermittently. The problem has been waxing and waning. Associated symptoms include headaches. Pertinent negatives include no abdominal pain, chest pain, claudication, coryza, ear pain, fever, hemoptysis, leg pain, leg swelling, neck pain, orthopnea, PND, rash, rhinorrhea, sore throat, sputum production, swollen glands, syncope, vomiting or wheezing. Review of Systems   Constitutional: Negative. Negative for fever. HENT: Negative. Negative for ear pain, rhinorrhea and sore throat. Eyes: Negative. Respiratory: Positive for shortness of breath. Negative for hemoptysis, sputum production and wheezing. Cardiovascular: Negative. Negative for chest pain, orthopnea, claudication, leg swelling, syncope and PND. Gastrointestinal: Negative. Negative for abdominal pain and vomiting. Endocrine: Negative. Genitourinary: Negative. Musculoskeletal: Negative. Negative for neck pain. Skin: Negative. Negative for rash. Allergic/Immunologic: Negative. Neurological: Positive for headaches. Hematological: Negative. Psychiatric/Behavioral: Negative. Vitals:    04/15/22 1127   BP: 132/86   Site: Left Upper Arm   Position: Sitting   Cuff Size: Large Adult   Pulse: 97   Resp: 16   Temp: 99.5 °F (37.5 °C)   TempSrc: Temporal   SpO2: 99%   Weight: 243 lb (110.2 kg)   Height: 5' 8\" (1.727 m)        Physical Exam  Vitals and nursing note reviewed. Constitutional:       General: She is not in acute distress. Appearance: Normal appearance. She is not ill-appearing. HENT:      Head: Normocephalic and atraumatic. Right Ear: External ear normal.      Left Ear: External ear normal.      Nose: Nose normal.      Mouth/Throat:      Mouth: Mucous membranes are moist.      Pharynx: Oropharynx is clear. Comments: Mallampati 3  Eyes:      General: No scleral icterus. Extraocular Movements: Extraocular movements intact. Conjunctiva/sclera: Conjunctivae normal.      Pupils: Pupils are equal, round, and reactive to light. Cardiovascular:      Rate and Rhythm: Normal rate and regular rhythm. Pulses: Normal pulses. Heart sounds: Normal heart sounds. No murmur heard. No friction rub. Pulmonary:      Effort: No respiratory distress. Breath sounds: No wheezing or rales. Abdominal:      General: Abdomen is flat. Bowel sounds are normal. There is no distension. Tenderness: There is no abdominal tenderness. There is no guarding. Musculoskeletal:         General: No swelling or tenderness. Normal range of motion. Cervical back: Normal range of motion and neck supple. No rigidity. Skin:     General: Skin is warm and dry. Coloration: Skin is not jaundiced. Neurological:      General: No focal deficit present. Mental Status: She is alert and oriented to person, place, and time. Mental status is at baseline. Cranial Nerves: No cranial nerve deficit. Sensory: No sensory deficit. Motor: No weakness. Gait: Gait normal.   Psychiatric:         Mood and Affect: Mood normal.         Thought Content:  Thought content normal.         Judgment: Judgment normal.              Sanya Ferrera MD

## 2022-04-18 ENCOUNTER — TELEPHONE (OUTPATIENT)
Dept: PULMONOLOGY | Age: 52
End: 2022-04-18

## 2022-04-18 NOTE — TELEPHONE ENCOUNTER
Patient Echo Schedule for 5/11/22 at 10:30 am, at [facility] office [id number]    Call pt left VM

## 2022-05-03 ENCOUNTER — PATIENT MESSAGE (OUTPATIENT)
Dept: DERMATOLOGY | Age: 52
End: 2022-05-03

## 2022-05-03 ENCOUNTER — TELEPHONE (OUTPATIENT)
Dept: DERMATOLOGY | Age: 52
End: 2022-05-03

## 2022-05-03 NOTE — TELEPHONE ENCOUNTER
From: Shirley Jim  To: Dr. Aleah Fiedls  Sent: 5/3/2022 3:10 PM EDT  Subject: Poison Ivy    this is spreading all over and ich so bad. I put cream on it but it won't stop spreading.

## 2022-05-03 NOTE — TELEPHONE ENCOUNTER
Called and spoke to patient and she states that she was working outside on Thursday and was exposed poison sumac. She stated that red, pruritic bumps started on her hands but have now spread to her arms and face. She has just been using a OTC poison ivy relief cream.   Patient will be sending pictures via [employer] later today for you to review.      Preferred pharmacy: [facility]/PHARMACY - [id number]

## 2022-05-03 NOTE — TELEPHONE ENCOUNTER
From: Mary Brito  To: Dr. Uma Cullen  Sent: 5/3/2022 3:13 PM EDT  Subject: Poison Ivy    I can only send 2 attachments I sent 2 already this are the other.

## 2022-05-03 NOTE — TELEPHONE ENCOUNTER
Patient says she has poison ivy all over her body or and she is itching badly and wanted to know was there anything dr Bryanna Esteban can prescribe her.  Pt c/b #[phone]

## 2022-05-04 ENCOUNTER — OFFICE VISIT (OUTPATIENT)
Dept: DERMATOLOGY | Age: 52
End: 2022-05-04
Payer: MEDICARE

## 2022-05-04 VITALS — TEMPERATURE: 97.8 F

## 2022-05-04 DIAGNOSIS — L23.7 POISON IVY DERMATITIS: Primary | ICD-10-CM

## 2022-05-04 PROCEDURE — 3017F COLORECTAL CA SCREEN DOC REV: CPT | Performed by: DERMATOLOGY

## 2022-05-04 PROCEDURE — G8417 CALC BMI ABV UP PARAM F/U: HCPCS | Performed by: DERMATOLOGY

## 2022-05-04 PROCEDURE — G8427 DOCREV CUR MEDS BY ELIG CLIN: HCPCS | Performed by: DERMATOLOGY

## 2022-05-04 PROCEDURE — 99213 OFFICE O/P EST LOW 20 MIN: CPT | Performed by: DERMATOLOGY

## 2022-05-04 PROCEDURE — 1036F TOBACCO NON-USER: CPT | Performed by: DERMATOLOGY

## 2022-05-04 RX ORDER — PREDNISONE 10 MG/1
TABLET ORAL
Qty: 40 TABLET | Refills: 0 | Status: SHIPPED | OUTPATIENT
Start: 2022-05-04 | End: 2022-05-20

## 2022-05-04 RX ORDER — CLOBETASOL PROPIONATE 0.5 MG/G
CREAM TOPICAL
Qty: 45 G | Refills: 0 | Status: SHIPPED | OUTPATIENT
Start: 2022-05-04

## 2022-05-04 NOTE — PROGRESS NOTES
[facility]  Lori Rodriguez MD  [phone]      Gabriela Hernandez  1970    47 y.o. female     Date of Visit: 5/4/2022    Chief Complaint: rash    History of Present Illness:    She presents today for a several day history of worsening pruritic eruption on the extremities, face and groin. It appeared after working in her mother's yard. Has had moodiness with steroids in the past but reports otherwise tolerating it OK. Review of Systems:  Skin: no other skin lesions or growths. Past Medical History, Family History, Surgical History, Medications and Allergies reviewed. Past Medical History:   Diagnosis Date    Anxiety     Atrophic kidney     left    Cancer (HCC)     skin (basal cell)    Cellulitis     recurrent    Cervical disc herniation     Endometriosis     Lumbar disc disorder 2/21/2012    Migraines     Narcolepsy     Nephrolithiasis 2/21/2012    Ovarian cyst 2/21/2012     Past Surgical History:   Procedure Laterality Date    FOOT SURGERY  12/27/2012    BROSTROM ANKLE LIGAMENT REPAIR LEFT, PARTIAL EXCISION OF BONE    LAPAROSCOPY      for endometriosis    LUMBAR FUSION  2008    SHOULDER SURGERY      left    TONSILLECTOMY  2006       Allergies   Allergen Reactions    Codeine Hives    Morphine And Related Other (See Comments) and Rash     ineffective      Percocet [Oxycodone-Acetaminophen] Itching    Prednisone     Tetracyclines & Related      Outpatient Medications Marked as Taking for the 5/4/22 encounter (Office Visit) with Melissa Azul MD   Medication Sig Dispense Refill    predniSONE (DELTASONE) 10 MG tablet Take 4 tablets by mouth daily for 4 days, THEN 3 tablets daily for 4 days, THEN 2 tablets daily for 4 days, THEN 1 tablet daily for 4 days. 40 tablet 0    clobetasol (TEMOVATE) 0.05 % cream Apply to affected areas twice daily for up to 2 weeks or until improved.  45 g 0    methylphenidate (RITALIN) 10 MG tablet Take 10 mg by mouth 2 times daily.      [START ON 6/15/2022] methylphenidate (RITALIN) 20 MG tablet Take 1 tablet by mouth 3 times daily for 30 days. 90 tablet 0    [START ON 5/15/2022] methylphenidate (RITALIN) 20 MG tablet Take 1 tablet by mouth 3 times daily for 30 days. 90 tablet 0    methylphenidate (RITALIN) 20 MG tablet Take 1 tablet by mouth 3 times daily for 30 days. 90 tablet 0    amitriptyline (ELAVIL) 50 MG tablet Take 1 tablet by mouth nightly 90 tablet 1    amitriptyline (ELAVIL) 50 MG tablet TAKE 1 TABLET BY MOUTH EVERY DAY AT NIGHT 30 tablet 3    selenium sulfide (SELSUN) 2.5 % lotion DISPENSE SHAMPOO. USE TO [address] THE HAIR 3 TIMES WEEKLY. LEAVE ON SCALP FOR 5 MINUTES PRIOR TO RINSING 120 mL 5    acetaminophen (TYLENOL) 500 MG tablet TAKE 2 TABLETS BY MOUTH EVERY 6 (SIX) HOURS AS NEEDED (FOR PAIN).  diazePAM (VALIUM) 5 MG tablet TAKE 1 TABLET BY MOUTH EVERY DAY IN THE EVENING      melatonin 3 MG TABS tablet Take 3 mg by mouth daily      [DISCONTINUED] ibuprofen (IBU) 800 MG tablet Take 1 tablet by mouth every 8 hours as needed for Pain. Take 1 tab every 8 hours with food as needed for pain and swelling   120 tablet 3    clonazepam (KLONOPIN) 1 MG tablet Take 1 mg by mouth nightly as needed. Physical Examination       Well-appearing. 1.  Scattered on the extremities are multiple round erythematous edematous papules and linear plaques with superimposed vesicles. Lower face and groin with few excoriated pink papules. Assessment and Plan     1. Poison ivy dermatitis with facial and groin involvement    Prednisone 40 mg daily for 4 days, then 30 mg daily for 4 days, then 20 mg daily for 4 days, then 10 mg daily for 4 days. Instructed to take on a full stomach after breakfast.  Discussed risks including that of mood and sleep disturbance. Clobetasol cream twice daily to affected areas on the extremities until improved.          --Candace Guthrie MD

## 2022-05-11 ENCOUNTER — HOSPITAL ENCOUNTER (OUTPATIENT)
Dept: CARDIOLOGY | Age: 52
Discharge: HOME | End: 2022-05-11
Payer: MEDICARE

## 2022-05-11 DIAGNOSIS — R06.02 SHORTNESS OF BREATH: ICD-10-CM

## 2022-05-11 DIAGNOSIS — Z72.0 TOBACCO ABUSE: ICD-10-CM

## 2022-05-11 LAB
LV EF: 55 %
LVEF MODALITY: NORMAL

## 2022-05-11 PROCEDURE — 93306 TTE W/DOPPLER COMPLETE: CPT

## 2022-05-11 PROCEDURE — 6360000004 HC RX CONTRAST MEDICATION: Performed by: INTERNAL MEDICINE

## 2022-05-11 PROCEDURE — C8929 TTE W OR WO FOL WCON,DOPPLER: HCPCS

## 2022-05-11 RX ADMIN — PERFLUTREN 1.65 MG: 6.52 INJECTION, SUSPENSION INTRAVENOUS at 11:32

## 2022-05-16 ENCOUNTER — HOSPITAL ENCOUNTER (OUTPATIENT)
Dept: SLEEP CENTER | Age: 52
Discharge: HOME | End: 2022-05-18
Payer: MEDICARE

## 2022-05-16 DIAGNOSIS — G47.33 OSA (OBSTRUCTIVE SLEEP APNEA): ICD-10-CM

## 2022-05-16 DIAGNOSIS — G47.411 CATAPLEXY AND NARCOLEPSY: ICD-10-CM

## 2022-05-16 DIAGNOSIS — G47.10 HYPERSOMNOLENCE: ICD-10-CM

## 2022-05-16 PROCEDURE — 95806 SLEEP STUDY UNATT&RESP EFFT: CPT

## 2022-05-20 PROCEDURE — 95806 SLEEP STUDY UNATT&RESP EFFT: CPT | Performed by: INTERNAL MEDICINE

## 2022-05-23 ENCOUNTER — TELEPHONE (OUTPATIENT)
Dept: PULMONOLOGY | Age: 52
End: 2022-05-23

## 2022-05-23 NOTE — TELEPHONE ENCOUNTER
I left a message on the patient's cell phone  Will need to do AutoPap, will send to [facility]  I suspect that their sleep apnea is contributing to her excessive daytime sleepiness on top of her Narcolepsy with cataplexy      I also talked her about the inspire possibly and left the website to look up more information about this                       [address]  Dept: [phone]  Loc: [phone]     Diagnosis:  [x] DARIEL (ICD-10: G47.33)  o CSA (ICD-10: G47.31)  [] Complex Sleep Apnea (ICD-10: G47.37)  []  (ICD-10: G47.37)  [] Hypoxemia (ICD-10: R09.02)  [] COPD (J44.90)  [] Chronic Respiratory Failure with hypoxemia (J96.11)  [] Chronicr Respiratory Failure with hypercapnia (J96.12)  [] Restrictive Lung Disease (J98.4)      [x] New Rx (Device Preference: _AutoPap at [facility] ________________________)     [] Change Order       [] Replace ___________  [] Clinical assessments and may include IN-Check device, spirometry and ETCO2 PRN    [] Discontinue Order -  [] CPAP    [] BPAP    [] PAP    [] Oxygen   /   AMA?  [] Yes   [] No              Therapy    AHI: ________ VIKI: ________  LOW SpO2: ________%      DME: [facility]    DEVICE / SETTINGS RAMP / COMFORT INTERFACE   []  CPAP () Pressure    Ramp: _________ min's  [] Ramp to patient preference General PAP Supply Kit  Medicaid does not cover heated tubing  ([employer])  PAP Tubing:  [x] Heated ()- 1/3 mos                         [x] Regular () - 1/3 mos  [x] Disposable Water Chamber () - 1/6 mos  [x] Disposable Filter () - 2/mo  [x] Non-Disposable Filter () - 1/6 mos   []  BiLevel PAP ()           IPAP        []  BiLevel PAP with   ()       Backup Rate ()      EPAP Rate  [] Adjust FLEX to patient comfort       SUPPLEMENTAL OXYGEN  [] OXYGEN:      Liter/min: _________  [] Continuous        [] Nocturnal  [] Bleed into PAP Device      [x]  Cloakware 5                  Max Press 20  Mask Interface Kit    [x] Mask interface () - 1/3 mos  [x] Nasal Cushion () - 2/mo  [x] Nasal Pillows ()  -2/mo  [x] Headgear () - 1/6 mos   []  AutoBiLevel () Pressure  ()      Support           []  ResMed® IVAPS EPAP  [] Overnight Oximetry on Room Air  [] Overnight Oximetry on PAP Therapy    Target Pt Rate  Min PS      Target Va  Patient Ht  Ti Max                Ti Min        Rise time  Max PS  Trigger  Cycle  Epap  Epap max  Epap min  The patient has a history of:  [] Excessive Daytime Sleepiness  [] Insomnia  [] Impaired Cognition  [] Ischemic Heart Disease  [] Hypertension  [] Mood Disorders          [] History of Stroke  Additional Orders:______________________________________________________________________________________________________________________________________________________________________________    [] Titrate to comfort Full Face Mask Kit    [] Full face mask () - 1/3 mos  [] Full Face Cushion () - 1/mo  [] Headgear () - 1/6 mos                                           [] Respironics® ASV Advanced ()     EPAP Min  PS Min      EPAP Max  PS Max   Additional Supplies    [] Chin Strap ()  [] Heated Humidifier Kit ()  Mask Specifications:   [] Patient Preference      -or-            Brand:______________ Size:_______________   Type: __________________________________   [] Mask Refit:___________________________                                           Max Press  Ramp Time      Rate  Bi-flex: []1  []2  []3     [] Respironics® AVAPS: ()     IPAP Min  IPAP Max  Pressure Max  Epap max  Epap min  Rise time                      Avaps rate  EPAP   Additional Services    [] Annual PRN service and check of equipment  [] Routine service and check of equipment  [] Download and report compliance data   Tidal volume      Tigger                                     Rate Inspiratory time                                                         The following equipment is Medically Necessary for the above stated patient. It is reasonable and necessary in reference to acceptable standards of medical practice for this condition, and is not prescribed as a convenience. Frequency of Use:    Daily                 Length of Need: 13 Months              o The patient requires BiLevel PAP and the following apply: []  The patient requires a Respiratory Assist Device (RAD) and the following apply:   o CPAP was tried and failed to meet therapeutic goals. [] CPAP was tried, but failed to meet therapeutic goals   o The prescribed mask interface has been properly fit, is the most comfortable to the patient and will be used with the BPAP device. [] The prescribed mask interface has been properly fit, is the most comfortable to the patient and will be used with the RAD.   o Current CPAP setting prevents patient from tolerating the therapy and lower CPAP settings fail to adequately control the symptoms of DARIEL, improve sleep quality, or reduce AHI to acceptable levels. [] Current CPAP setting prevent patient from tolerating the therapy and lower PAP settings fail to  adequately control DARIEL symptoms, improve sleep quality, or reduce AHI to acceptable levels.          [] There is significant improvement of the respiratory events on the RAD                                                                                                                                                                                                                                  Christa Brock MD NPI- [id number]     [place]- [phone]                    05/23/22       ____________________________                        _______________________           Physician Signature                                                         Date

## 2022-05-24 ENCOUNTER — HOSPITAL ENCOUNTER (OUTPATIENT)
Dept: PULMONOLOGY | Age: 52
Discharge: HOME | End: 2022-05-24
Payer: MEDICARE

## 2022-05-24 VITALS — OXYGEN SATURATION: 95 %

## 2022-05-24 DIAGNOSIS — Z72.0 CONTINUOUS TOBACCO ABUSE: ICD-10-CM

## 2022-05-24 DIAGNOSIS — Z72.0 TOBACCO ABUSE: ICD-10-CM

## 2022-05-24 DIAGNOSIS — R06.02 SHORTNESS OF BREATH: ICD-10-CM

## 2022-05-24 DIAGNOSIS — R06.02 SHORTNESS OF BREATH: Primary | ICD-10-CM

## 2022-05-24 PROCEDURE — 94010 BREATHING CAPACITY TEST: CPT

## 2022-05-24 PROCEDURE — 94070 EVALUATION OF WHEEZING: CPT

## 2022-05-24 PROCEDURE — 6360000002 HC RX W HCPCS: Performed by: INTERNAL MEDICINE

## 2022-05-24 PROCEDURE — 94726 PLETHYSMOGRAPHY LUNG VOLUMES: CPT

## 2022-05-24 PROCEDURE — 94729 DIFFUSING CAPACITY: CPT

## 2022-05-24 PROCEDURE — 94760 N-INVAS EAR/PLS OXIMETRY 1: CPT

## 2022-05-24 RX ORDER — ALBUTEROL SULFATE 2.5 MG/3ML
2.5 SOLUTION RESPIRATORY (INHALATION) ONCE
Status: COMPLETED | OUTPATIENT
Start: 2022-05-24 | End: 2022-05-24

## 2022-05-24 RX ADMIN — METHACHOLINE CHLORIDE 25 MG: 100 POWDER, FOR SOLUTION RESPIRATORY (INHALATION) at 13:35

## 2022-05-24 RX ADMIN — METHACHOLINE CHLORIDE 2.5 MG: 100 POWDER, FOR SOLUTION RESPIRATORY (INHALATION) at 13:22

## 2022-05-24 RX ADMIN — METHACHOLINE CHLORIDE 10 MG: 100 POWDER, FOR SOLUTION RESPIRATORY (INHALATION) at 13:29

## 2022-05-24 RX ADMIN — METHACHOLINE CHLORIDE 0.25 MG: 100 POWDER, FOR SOLUTION RESPIRATORY (INHALATION) at 13:15

## 2022-05-24 RX ADMIN — ALBUTEROL SULFATE 2.5 MG: 2.5 SOLUTION RESPIRATORY (INHALATION) at 13:47

## 2022-05-24 NOTE — PROGRESS NOTES
Kristopher Duffy from PFT labs to ask for a new order for Pt,for Full PFT W/Methacholine .   Order post

## 2022-05-26 PROCEDURE — 94010 BREATHING CAPACITY TEST: CPT | Performed by: INTERNAL MEDICINE

## 2022-05-26 PROCEDURE — 94729 DIFFUSING CAPACITY: CPT | Performed by: INTERNAL MEDICINE

## 2022-05-26 PROCEDURE — 94070 EVALUATION OF WHEEZING: CPT | Performed by: INTERNAL MEDICINE

## 2022-05-26 PROCEDURE — 94726 PLETHYSMOGRAPHY LUNG VOLUMES: CPT | Performed by: INTERNAL MEDICINE

## 2022-05-26 NOTE — PROCEDURES
[address] [facility]                 Francois Guerrier                                PULMONARY FUNCTION    PATIENT NAME: Claribel Fuentes                   :        1970  MED REC NO:   [id number]                          ROOM:  ACCOUNT NO:   [de-identified]                           ADMIT DATE: 2022  PROVIDER:     Pacheco Thomas MD    DATE OF PROCEDURE:  2022    Full PFT done. FEV1 of 2.49, 78% predicted, FVC of 3.53, 88% predicted,  FEV1 to FVC ratio was 70% showing no obstructive lung defect. Lung  volumes do not show air trapping, hyperinflation, or restrictive lung  defect. Diffusion is normal when adjusted for alveolar ventilation. IMPRESSION:  Normal spirometry. Lung volumes showed diffusion. Flow  volume loops are slightly flattened. Expiratory loop could have a  variable intrathoracic obstruction.         Sherren Heidelberg, MD    D: 2022 16:12:36       T: 2022 21:29:46     [id number]  Job#: [id number]     Doc#: [id number]    CC:

## 2022-05-26 NOTE — PROCEDURES
[address] [address]                               PULMONARY FUNCTION    PATIENT NAME: Brown Hancock                   :        1970  MED REC NO:   [id number]                          ROOM:  ACCOUNT NO:   [de-identified]                           ADMIT DATE: 2022  PROVIDER:     Niecy Mcnally MD    DATE OF PROCEDURE:  2022    METHACHOLINE CHALLENGE    Five doses of incremental increasing doses of methacholine were given. There was no drop in FEV1 less than 20%. IMPRESSION:  Negative methacholine challenge.         Flori Nieves MD    D: 2022 16:12:36       T: 2022 21:30:44     [id number]  Job#: [id number]     Doc#: [id number]    CC:

## 2022-06-01 DIAGNOSIS — G47.00 INSOMNIA, UNSPECIFIED TYPE: ICD-10-CM

## 2022-06-08 RX ORDER — CLONAZEPAM 1 MG/1
TABLET ORAL
Qty: 60 TABLET | Refills: 3 | Status: SHIPPED | OUTPATIENT
Start: 2022-06-08 | End: 2022-07-07 | Stop reason: CLARIF

## 2022-07-07 ENCOUNTER — OFFICE VISIT (OUTPATIENT)
Dept: PULMONOLOGY | Age: 52
End: 2022-07-07
Payer: MEDICARE

## 2022-07-07 VITALS
TEMPERATURE: 98.5 F | RESPIRATION RATE: 16 BRPM | HEIGHT: 68 IN | DIASTOLIC BLOOD PRESSURE: 103 MMHG | BODY MASS INDEX: 38.04 KG/M2 | WEIGHT: 251 LBS | HEART RATE: 107 BPM | SYSTOLIC BLOOD PRESSURE: 140 MMHG | OXYGEN SATURATION: 98 %

## 2022-07-07 DIAGNOSIS — G47.10 HYPERSOMNOLENCE: ICD-10-CM

## 2022-07-07 DIAGNOSIS — G47.52 RBD (REM BEHAVIORAL DISORDER): ICD-10-CM

## 2022-07-07 DIAGNOSIS — G47.61 PLMD (PERIODIC LIMB MOVEMENT DISORDER): ICD-10-CM

## 2022-07-07 DIAGNOSIS — Z72.0 CONTINUOUS TOBACCO ABUSE: ICD-10-CM

## 2022-07-07 DIAGNOSIS — E66.9 CLASS 2 OBESITY WITH BODY MASS INDEX (BMI) OF 38.0 TO 38.9 IN ADULT, UNSPECIFIED OBESITY TYPE, UNSPECIFIED WHETHER SERIOUS COMORBIDITY PRESENT: ICD-10-CM

## 2022-07-07 DIAGNOSIS — R06.02 SHORTNESS OF BREATH: ICD-10-CM

## 2022-07-07 DIAGNOSIS — G47.411 CATAPLEXY AND NARCOLEPSY: Primary | ICD-10-CM

## 2022-07-07 DIAGNOSIS — G47.33 OSA (OBSTRUCTIVE SLEEP APNEA): ICD-10-CM

## 2022-07-07 PROCEDURE — 1036F TOBACCO NON-USER: CPT | Performed by: INTERNAL MEDICINE

## 2022-07-07 PROCEDURE — G8427 DOCREV CUR MEDS BY ELIG CLIN: HCPCS | Performed by: INTERNAL MEDICINE

## 2022-07-07 PROCEDURE — 3017F COLORECTAL CA SCREEN DOC REV: CPT | Performed by: INTERNAL MEDICINE

## 2022-07-07 PROCEDURE — G8417 CALC BMI ABV UP PARAM F/U: HCPCS | Performed by: INTERNAL MEDICINE

## 2022-07-07 PROCEDURE — 99214 OFFICE O/P EST MOD 30 MIN: CPT | Performed by: INTERNAL MEDICINE

## 2022-07-07 RX ORDER — METHYLPHENIDATE HYDROCHLORIDE 20 MG/1
20 TABLET ORAL 3 TIMES DAILY
Qty: 90 TABLET | Refills: 0 | Status: SHIPPED | OUTPATIENT
Start: 2022-08-07 | End: 2022-09-06

## 2022-07-07 RX ORDER — METHYLPHENIDATE HYDROCHLORIDE 20 MG/1
20 TABLET ORAL 3 TIMES DAILY
Qty: 90 TABLET | Refills: 0 | Status: SHIPPED | OUTPATIENT
Start: 2022-07-07 | End: 2022-08-06

## 2022-07-07 RX ORDER — METHYLPHENIDATE HYDROCHLORIDE 20 MG/1
20 TABLET ORAL 3 TIMES DAILY
Qty: 90 TABLET | Refills: 0 | Status: SHIPPED | OUTPATIENT
Start: 2022-09-07 | End: 2022-10-07

## 2022-07-07 RX ORDER — CLONAZEPAM 1 MG/1
2 TABLET ORAL NIGHTLY
Qty: 60 TABLET | Refills: 3 | Status: SHIPPED | OUTPATIENT
Start: 2022-08-05 | End: 2022-09-14

## 2022-07-07 RX ORDER — ALBUTEROL SULFATE 90 UG/1
2 AEROSOL, METERED RESPIRATORY (INHALATION) 4 TIMES DAILY PRN
Qty: 54 G | Refills: 1 | Status: SHIPPED | OUTPATIENT
Start: 2022-07-07

## 2022-07-07 ASSESSMENT — ENCOUNTER SYMPTOMS
SORE THROAT: 0
GASTROINTESTINAL NEGATIVE: 1
SPUTUM PRODUCTION: 0
ORTHOPNEA: 0
ALLERGIC/IMMUNOLOGIC NEGATIVE: 1
WHEEZING: 0
ABDOMINAL PAIN: 0
EYES NEGATIVE: 1
SHORTNESS OF BREATH: 1
SWOLLEN GLANDS: 0
VOMITING: 0
RHINORRHEA: 0
HEMOPTYSIS: 0
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
## 2022-07-07 NOTE — PATIENT INSTRUCTIONS
ASSESSMENT/PLAN:   Diagnosis Orders   1. Cataplexy and narcolepsy     2. DARIEL (obstructive sleep apnea)     3. Continuous tobacco abuse     4. Shortness of breath     5. RBD (REM behavioral disorder)     6. Hypersomnolence     7. PLMD (periodic limb movement disorder)       OF NOTE: Stephanie narcolepsy is very severe and has lead to be on social security and incapable of self support     Still having RBD a, crying and dreaming more at night, now also having walking out of the bedroom at night at times, fell down steps     Having cataplexy, still about the same, getting this about 2-3 times a day     Tried and failed with: nortryptiline and xyrem (tried 2 times)   Tried and failed with Wakix, had side effects, adderall, vyvanse,   Had reaction now a side effect of tongue drying  Adderall 30 mg twice a day    Weight is at 250 from 243 from  200 from 191 and was 216 coming down  Will send to gastric surgeon for weight loss         Continue with:  Klonopin 2 mg at night   Melatonin 20 mg at night  Amitriptyline 50 mg at night  Ritalin 20 mg Three times- will try to back off later after the dariel has been treated        Stress is a trigger    Now having cataplexy at about 3 times a day  And the arms are very hard to move        DARIEL        Still waiting for autopap  [facility] to [facility]  Will need to see if this has come in      Smoking  Started back on  But will stop soon        LDCT scan done 3/1/22     No suspicious nodules. No acute thoracic findings. Areas of mild scattered atelectasis/scarring throughout the lungs. LUNG-RADS CATEGORY:   1: No nodules or definitely benign nodules. (Nodule/s with specific calcifications: complete, central, popcorn, concentric rings and fat containing nodules)         MODIFIER: None     NODULE RECOMMENDATION:  Category 1 or 2: Continue annual low dose CT lung cancer screening.  Patient should return in one year for a follow-up exam         covid vaccination x 2,       Shortness practice. Please be aware that our physicians are required to work in the Intensive Care Unit at [facility].  Your appointment may need to be rescheduled if they are designated to work during your appointment time. You may receive a survey regarding the care you received during your visit. Your input is valuable to us. We encourage you to complete and return your survey. We hope you will choose us in the future for your healthcare needs. Pt instructed of all future appointment dates & times, including radiology, labs, procedures & referrals. If procedures were scheduled preparation instructions provided. Instructions on future appointments with [facility] were given.

## 2022-07-07 NOTE — LETTER
[address] [facility] Pulmonary Critical Care and Sleep  [address] [facility] [address] [address]  Phone: [phone]  Fax: [phone]    Gissell Cote MD        July 7, 2022     Patient: Matt Senior   YOB: 1970   Date of Visit: 7/7/2022 7/7/22        Fawad Nurse Bob      I have seen this patient in the office today and wanted to communicate my findings and recommendations. Patient Instructions       ASSESSMENT/PLAN:   Diagnosis Orders   1. Cataplexy and narcolepsy     2. DARIEL (obstructive sleep apnea)     3. Continuous tobacco abuse     4. Shortness of breath     5. RBD (REM behavioral disorder)     6. Hypersomnolence     7. PLMD (periodic limb movement disorder)       OF NOTE: Stephanie narcolepsy is very severe and has lead to be on social security and incapable of self support     Still having RBD a, crying and dreaming more at night, now also having walking out of the bedroom at night at times, fell down steps     Having cataplexy, still about the same, getting this about 2-3 times a day     Tried and failed with: nortryptiline and xyrem (tried 2 times)   Tried and failed with Wakix, had side effects, adderall, vyvanse,   Had reaction now a side effect of tongue drying  Adderall 30 mg twice a day    Weight is at 250 from 243 from  200 from 191 and was 216 coming down  Will send to gastric surgeon for weight loss         Continue with:  Klonopin 2 mg at night   Melatonin 20 mg at night  Amitriptyline 50 mg at night  Ritalin 20 mg Three times- will try to back off later after the dariel has been treated        Stress is a trigger    Now having cataplexy at about 3 times a day  And the arms are very hard to move        DARIEL        Still waiting for autopap  [facility] to [facility]  Will need to see if this has come in      Smoking  Started back on  But will stop soon        LDCT scan done 3/1/22     No suspicious nodules. No acute thoracic findings.  Areas of mild scattered atelectasis/scarring throughout the lungs. LUNG-RADS CATEGORY:   1: No nodules or definitely benign nodules. (Nodule/s with specific calcifications: complete, central, popcorn, concentric rings and fat containing nodules)         MODIFIER: None     NODULE RECOMMENDATION:  Category 1 or 2: Continue annual low dose CT lung cancer screening. Patient should return in one year for a follow-up exam         covid vaccination x 2,       Shortness of breath    Will give   Albuterol 2 puffs as needed, can use every 6 hours          PFT   DATE OF PROCEDURE:  05/25/2022     Full PFT done. FEV1 of 2.49, 78% predicted, FVC of 3.53, 88% predicted,  FEV1 to FVC ratio was 70% showing no obstructive lung defect. Lung  volumes do not show air trapping, hyperinflation, or restrictive lung  defect. Diffusion is normal when adjusted for alveolar ventilation.     IMPRESSION:  Normal spirometry. Lung volumes showed diffusion. Flow  volume loops are slightly flattened. Expiratory loop could have a  variable intrathoracic obstruction. methacholine challenge    DATE OF PROCEDURE:  05/25/2022     METHACHOLINE CHALLENGE     Five doses of incremental increasing doses of methacholine were given. There was no drop in FEV1 less than 20%.    IMPRESSION:  Negative methacholine challenge.           Echo of the heart b/c of issus of long term amphetamine and dizziness and shortness breath. Conclusions      Summary   Normal left ventricle systolic function with an estimated ejection fraction   of 55%. Definity contrast administered with no evidence of left ventricular mass or   thrombus noted. No regional wall motion abnormalities are seen. Grade I diastolic dysfunction with normal filing pressure.    No significant valvular heart disease noted.       RTC in 3 months                         Thank you for allowing me to assist in the care of the [address] MD Elie Tello Hams

## 2022-07-07 NOTE — LETTER
[facility] Pulmonary Critical Care and Sleep  [address] [facility] [address] [place] [address]  Phone: [phone]  Fax: [phone]    Margie Alejandro MD        July 7, 2022     Patient: Sabrina Holley   YOB: 1970   Date of Visit: 7/7/2022 7/7/22        Desmond Rojas      I have seen this patient in the office today and wanted to communicate my findings and recommendations. Patient Instructions       ASSESSMENT/PLAN:   Diagnosis Orders   1. Cataplexy and narcolepsy     2. DARIEL (obstructive sleep apnea)     3. Continuous tobacco abuse     4. Shortness of breath     5. RBD (REM behavioral disorder)     6. Hypersomnolence     7. PLMD (periodic limb movement disorder)       OF NOTE: Stephanie narcolepsy is very severe and has lead to be on social security and incapable of self support     Still having RBD a, crying and dreaming more at night, now also having walking out of the bedroom at night at times, fell down steps     Having cataplexy, still about the same, getting this about 2-3 times a day     Tried and failed with: nortryptiline and xyrem (tried 2 times)   Tried and failed with Wakix, had side effects, adderall, vyvanse,   Had reaction now a side effect of tongue drying  Adderall 30 mg twice a day    Weight is at 250 from 243 from  200 from 191 and was 216 coming down  Will send to gastric surgeon for weight loss         Continue with:  Klonopin 2 mg at night   Melatonin 20 mg at night  Amitriptyline 50 mg at night  Ritalin 20 mg Three times- will try to back off later after the dariel has been treated        Stress is a trigger    Now having cataplexy at about 3 times a day  And the arms are very hard to move        DARIEL        Still waiting for autopap  [facility] to [facility]  Will need to see if this has come in      Smoking  Started back on  But will stop soon        LDCT scan done 3/1/22     No suspicious nodules. No acute thoracic findings.  Areas of mild scattered atelectasis/scarring throughout the lungs. LUNG-RADS CATEGORY:   1: No nodules or definitely benign nodules. (Nodule/s with specific calcifications: complete, central, popcorn, concentric rings and fat containing nodules)         MODIFIER: None     NODULE RECOMMENDATION:  Category 1 or 2: Continue annual low dose CT lung cancer screening. Patient should return in one year for a follow-up exam         covid vaccination x 2,       Shortness of breath    Will give   Albuterol 2 puffs as needed, can use every 6 hours          PFT   DATE OF PROCEDURE:  05/25/2022     Full PFT done. FEV1 of 2.49, 78% predicted, FVC of 3.53, 88% predicted,  FEV1 to FVC ratio was 70% showing no obstructive lung defect. Lung  volumes do not show air trapping, hyperinflation, or restrictive lung  defect. Diffusion is normal when adjusted for alveolar ventilation.     IMPRESSION:  Normal spirometry. Lung volumes showed diffusion. Flow  volume loops are slightly flattened. Expiratory loop could have a  variable intrathoracic obstruction. methacholine challenge    DATE OF PROCEDURE:  05/25/2022     METHACHOLINE CHALLENGE     Five doses of incremental increasing doses of methacholine were given. There was no drop in FEV1 less than 20%.    IMPRESSION:  Negative methacholine challenge.           Echo of the heart b/c of issus of long term amphetamine and dizziness and shortness breath. Conclusions      Summary   Normal left ventricle systolic function with an estimated ejection fraction   of 55%. Definity contrast administered with no evidence of left ventricular mass or   thrombus noted. No regional wall motion abnormalities are seen. Grade I diastolic dysfunction with normal filing pressure.    No significant valvular heart disease noted.       RTC in 3 months                         Thank you for allowing me to assist in the care of the [address]  MD Evi FERREIRA

## 2022-07-15 ENCOUNTER — TELEPHONE (OUTPATIENT)
Dept: PULMONOLOGY | Age: 52
End: 2022-07-15

## 2022-07-15 NOTE — TELEPHONE ENCOUNTER
Patient left a message on the recorder stating that she has covid and would like a return call from Dr. Hussain Nelson. I attempted to call the patient to get additional information but was only able to leave a message for the patient.

## 2022-07-18 ENCOUNTER — TELEPHONE (OUTPATIENT)
Dept: PULMONOLOGY | Age: 52
End: 2022-07-18

## 2022-07-18 NOTE — TELEPHONE ENCOUNTER
Patient called and stated she has covid and needs some steroids called in, very short of breath and fatigued real bad. Please advise.  Thanks

## 2022-07-20 NOTE — TELEPHONE ENCOUNTER
Patient called stating that she would like the steroid called in. Patient is upset that this hasn't already been taken care of. She would like this sent to [address] [place] [address].

## 2022-07-21 RX ORDER — PREDNISONE 10 MG/1
TABLET ORAL
Qty: 30 TABLET | Refills: 0 | Status: SHIPPED | OUTPATIENT
Start: 2022-07-21 | End: 2022-09-14

## 2022-08-03 ENCOUNTER — OFFICE VISIT (OUTPATIENT)
Dept: DERMATOLOGY | Age: 52
End: 2022-08-03
Payer: MEDICARE

## 2022-08-03 DIAGNOSIS — Z85.828 HISTORY OF NONMELANOMA SKIN CANCER: ICD-10-CM

## 2022-08-03 DIAGNOSIS — L70.5 ACNE EXCORIEE: Primary | ICD-10-CM

## 2022-08-03 DIAGNOSIS — D22.9 MULTIPLE NEVI: ICD-10-CM

## 2022-08-03 PROCEDURE — 4004F PT TOBACCO SCREEN RCVD TLK: CPT | Performed by: DERMATOLOGY

## 2022-08-03 PROCEDURE — G8417 CALC BMI ABV UP PARAM F/U: HCPCS | Performed by: DERMATOLOGY

## 2022-08-03 PROCEDURE — 99213 OFFICE O/P EST LOW 20 MIN: CPT | Performed by: DERMATOLOGY

## 2022-08-03 PROCEDURE — G8427 DOCREV CUR MEDS BY ELIG CLIN: HCPCS | Performed by: DERMATOLOGY

## 2022-08-03 PROCEDURE — 3017F COLORECTAL CA SCREEN DOC REV: CPT | Performed by: DERMATOLOGY

## 2022-08-03 NOTE — PROGRESS NOTES
[facility]  Shell Quinn MD  [address] [place]  1970    47 y.o. female     Date of Visit: 8/3/2022    Chief Complaint: acne, skin moles    History of Present Illness: Follow up for acne excoriee - overall doing well right. Uses over-the-counter benzyl peroxide gel daily as needed for spot treatment. 2.  She has several moles, mainly on the back-not aware of any concerning changes. 3.  She has a history of multiple nonmelanoma skin cancers-denies any signs of recurrence. Nodular and superficial BCC of the posterior aspect of the left leg-treated with electrodesiccation and curettage on 2/25/2022. SCC in situ of the left medial calf treated with curettage in March 2016. BCC the right lower calf treated with curettage in March 2016. SCC in situ of the left lower shin-treated with curettage on 9/1/2015. Review of Systems:  Gen: Feels well, good sense of health. Past Medical History, Family History, Surgical History, Medications and Allergies reviewed.     Past Medical History:   Diagnosis Date    Anxiety     Atrophic kidney     left    Cancer (HCC)     skin (basal cell)    Cellulitis     recurrent    Cervical disc herniation     Endometriosis     Lumbar disc disorder 2/21/2012    Migraines     Narcolepsy     Nephrolithiasis 2/21/2012    Ovarian cyst 2/21/2012     Past Surgical History:   Procedure Laterality Date    FOOT SURGERY  12/27/2012    BROSTROM ANKLE LIGAMENT REPAIR LEFT, PARTIAL EXCISION OF BONE    LAPAROSCOPY      for endometriosis    LUMBAR FUSION  2008    SHOULDER SURGERY      left    TONSILLECTOMY  2006       Allergies   Allergen Reactions    Codeine Hives    Morphine And Related Other (See Comments) and Rash     ineffective      Percocet [Oxycodone-Acetaminophen] Itching    Prednisone     Tetracyclines & Related      Outpatient Medications Marked as Taking for the 8/3/22 encounter (Office Visit) with Cara Almodovar MD   Medication Sig Dispense Refill    predniSONE (DELTASONE) 10 MG tablet 4 tab day for 3 days, Then 3 tab day for 3 days, then 2 tab day for 3 days, then1 tablet day 30 tablet 0    albuterol sulfate HFA (VENTOLIN HFA) 108 (90 Base) MCG/ACT inhaler Inhale 2 puffs into the lungs 4 times daily as needed for Wheezing 54 g 1    [START ON 8/5/2022] clonazePAM (KLONOPIN) 1 MG tablet Take 2 tablets by mouth at bedtime for 30 days. 60 tablet 3    methylphenidate (RITALIN) 20 MG tablet Take 1 tablet by mouth 3 times daily for 30 days. 90 tablet 0    [START ON 8/7/2022] methylphenidate (RITALIN) 20 MG tablet Take 1 tablet by mouth 3 times daily for 30 days. 90 tablet 0    [START ON 9/7/2022] methylphenidate (RITALIN) 20 MG tablet Take 1 tablet by mouth 3 times daily for 30 days. 90 tablet 0    clobetasol (TEMOVATE) 0.05 % cream Apply to affected areas twice daily for up to 2 weeks or until improved. 45 g 0    amitriptyline (ELAVIL) 50 MG tablet TAKE 1 TABLET BY MOUTH EVERY DAY AT NIGHT 30 tablet 3    selenium sulfide (SELSUN) 2.5 % lotion DISPENSE SHAMPOO. USE TO [address] THE HAIR 3 TIMES WEEKLY. LEAVE ON SCALP FOR 5 MINUTES PRIOR TO RINSING 120 mL 5    acetaminophen (TYLENOL) 500 MG tablet TAKE 2 TABLETS BY MOUTH EVERY 6 (SIX) HOURS AS NEEDED (FOR PAIN). melatonin 3 MG TABS tablet Take 3 mg by mouth daily      clonazePAM (KLONOPIN) 1 MG tablet Take 1 mg by mouth nightly as needed. Physical Examination       The following were examined and determined to be normal: Psych/Neuro, Conjunctivae/eyelids, Gums/teeth/lips, Neck, Breast/axilla/chest, Abdomen, Back, RUE, LUE, RLE, and LLE. The following were examined and determined to be abnormal: Head/face. Well-appearing. Face with some make-up today. 1.  Right side of the chin with 1 excoriation. Face otherwise clear. 2.  Back and flanks with scattered round to oval smooth brown macules. 3.  Clear. Assessment and Plan     1.  Acne excoriee - improved    Continue use of over-the-counter benzyl peroxide gel daily as needed. Avoid picking and scratching. 2. Multiple nevi - benign appearing    Sun protective behaviors, including use of at least SPF 30 sunscreen, and self skin examinations were encouraged. Call for any new or concerning lesions. 3. History of nonmelanoma skin cancers - clear    Sun protective behaviors, including use of at least SPF 30 sunscreen, and self skin examinations were encouraged. Call for any new or concerning lesions. Return in about 6 months (around 2/3/2023).     --Perlita Grayson MD

## 2022-08-29 ENCOUNTER — TELEPHONE (OUTPATIENT)
Dept: PULMONOLOGY | Age: 52
End: 2022-08-29

## 2022-08-29 NOTE — TELEPHONE ENCOUNTER
Patient called stating that she seen in her my chart that Dr. Jakub Santos wants a drug test and she needs to know what that is regarding. Also, she stated that no one has gotten back with her about her C-PAP. Please call patient and advise.

## 2022-08-29 NOTE — TELEPHONE ENCOUNTER
Dyana Wells at [facility].  She said pt should get her machine in about 2.5 weeks. She said she will call pt to update her. I also called pt and LM about it. In regards to the UDS pt needs it because she is on a controlled substance.

## 2022-09-13 ENCOUNTER — HOSPITAL ENCOUNTER (OUTPATIENT)
Age: 52
Discharge: HOME | End: 2022-09-13
Payer: MEDICARE

## 2022-09-13 ENCOUNTER — OFFICE VISIT (OUTPATIENT)
Dept: PULMONOLOGY | Age: 52
End: 2022-09-13
Payer: MEDICARE

## 2022-09-13 VITALS
RESPIRATION RATE: 16 BRPM | TEMPERATURE: 98.4 F | BODY MASS INDEX: 37.44 KG/M2 | HEIGHT: 68 IN | SYSTOLIC BLOOD PRESSURE: 145 MMHG | OXYGEN SATURATION: 99 % | DIASTOLIC BLOOD PRESSURE: 91 MMHG | HEART RATE: 84 BPM | WEIGHT: 247 LBS

## 2022-09-13 DIAGNOSIS — G47.33 OSA (OBSTRUCTIVE SLEEP APNEA): ICD-10-CM

## 2022-09-13 DIAGNOSIS — G47.411 CATAPLEXY AND NARCOLEPSY: ICD-10-CM

## 2022-09-13 DIAGNOSIS — Z72.0 CONTINUOUS TOBACCO ABUSE: ICD-10-CM

## 2022-09-13 DIAGNOSIS — E66.9 CLASS 2 OBESITY WITH BODY MASS INDEX (BMI) OF 38.0 TO 38.9 IN ADULT, UNSPECIFIED OBESITY TYPE, UNSPECIFIED WHETHER SERIOUS COMORBIDITY PRESENT: ICD-10-CM

## 2022-09-13 DIAGNOSIS — Z72.0 TOBACCO ABUSE: ICD-10-CM

## 2022-09-13 DIAGNOSIS — K21.9 GASTROESOPHAGEAL REFLUX DISEASE WITHOUT ESOPHAGITIS: Primary | ICD-10-CM

## 2022-09-13 DIAGNOSIS — R06.02 SHORTNESS OF BREATH: ICD-10-CM

## 2022-09-13 LAB
HCT VFR BLD CALC: 40.7 % (ref 36–48)
HEMOGLOBIN: 13.9 G/DL (ref 12–16)

## 2022-09-13 PROCEDURE — G8417 CALC BMI ABV UP PARAM F/U: HCPCS | Performed by: INTERNAL MEDICINE

## 2022-09-13 PROCEDURE — 3017F COLORECTAL CA SCREEN DOC REV: CPT | Performed by: INTERNAL MEDICINE

## 2022-09-13 PROCEDURE — G8427 DOCREV CUR MEDS BY ELIG CLIN: HCPCS | Performed by: INTERNAL MEDICINE

## 2022-09-13 PROCEDURE — 99215 OFFICE O/P EST HI 40 MIN: CPT | Performed by: INTERNAL MEDICINE

## 2022-09-13 PROCEDURE — 85014 HEMATOCRIT: CPT

## 2022-09-13 PROCEDURE — 4004F PT TOBACCO SCREEN RCVD TLK: CPT | Performed by: INTERNAL MEDICINE

## 2022-09-13 PROCEDURE — 36415 COLL VENOUS BLD VENIPUNCTURE: CPT

## 2022-09-13 PROCEDURE — 85018 HEMOGLOBIN: CPT

## 2022-09-13 RX ORDER — PANTOPRAZOLE SODIUM 40 MG/1
40 TABLET, DELAYED RELEASE ORAL
Qty: 90 TABLET | Refills: 1 | Status: SHIPPED | OUTPATIENT
Start: 2022-09-13

## 2022-09-13 ASSESSMENT — ENCOUNTER SYMPTOMS
ABDOMINAL PAIN: 0
SHORTNESS OF BREATH: 1
HEMOPTYSIS: 0
ORTHOPNEA: 0
GASTROINTESTINAL NEGATIVE: 1
SWOLLEN GLANDS: 0
EYES NEGATIVE: 1
SORE THROAT: 0
RHINORRHEA: 0
ALLERGIC/IMMUNOLOGIC NEGATIVE: 1
WHEEZING: 0
SPUTUM PRODUCTION: 0
VOMITING: 0

## 2022-09-13 NOTE — LETTER
[facility] Pulmonary Critical Care and Sleep  [address] [facility] [address] [address]  Phone: [phone]  Fax: [phone]    Lanny Atkins MD        September 13, 2022     Patient: Sigrid Stratton   YOB: 1970   Date of Visit: 9/13/2022 9/13/22        Celeste Rojas      I have seen this patient in the office today and wanted to communicate my findings and recommendations. Patient Instructions         ASSESSMENT/PLAN:   Diagnosis Orders   1. Cataplexy and narcolepsy        2. DARIEL (obstructive sleep apnea)        3. Continuous tobacco abuse        4. Shortness of breath        5. Class 2 obesity with body mass index (BMI) of 38.0 to 38.9 in adult, unspecified obesity type, unspecified whether serious comorbidity present        6.  Tobacco abuse          OF NOTE: Stephanie narcolepsy is very severe and has lead to be on social security and incapable of self support     Still having RBD a, crying and dreaming more at night, now also having walking out of the bedroom at night at times, fell down steps     Having cataplexy, still about the same, getting this about 2-3 times a day     Tried and failed with: nortryptiline and xyrem (tried 2 times)   Tried and failed with Wakix, had side effects, adderall, vyvanse,   Had reaction now a side effect of tongue drying  Adderall 30 mg twice a day    Weight is at 247 from 250 from 243 from  200 from 191 and was 216 coming down  Will send to gastric surgeon for weight loss         Continue with:  Klonopin 2 mg at night   Melatonin 20 mg at night  Amitriptyline 50 mg at night  Ritalin 20 mg Three times- will try to back off later after the dariel has been treated        Stress is a trigger    Now having cataplexy at about 3 times a day  And the arms are very hard to move        DARIEL        Still waiting for autopap  Sent to [facility]  Will need to see if this has come in      I have contacted the company    Smoking  Started back on  But will stop soon        LDCT scan done 3/1/22     No suspicious nodules. No acute thoracic findings. Areas of mild scattered atelectasis/scarring throughout the lungs. LUNG-RADS CATEGORY:   1: No nodules or definitely benign nodules. (Nodule/s with specific calcifications: complete, central, popcorn, concentric rings and fat containing nodules)         MODIFIER: None     NODULE RECOMMENDATION:  Category 1 or 2: Continue annual low dose CT lung cancer screening. Patient should return in one year for a follow-up exam         covid vaccination x 2,       Shortness of breath    continue  Albuterol 2 puffs as needed, can use every 6 hours          PFT   DATE OF PROCEDURE:  05/25/2022     Full PFT done. FEV1 of 2.49, 78% predicted, FVC of 3.53, 88% predicted,  FEV1 to FVC ratio was 70% showing no obstructive lung defect. Lung  volumes do not show air trapping, hyperinflation, or restrictive lung  defect. Diffusion is normal when adjusted for alveolar ventilation. IMPRESSION:  Normal spirometry. Lung volumes showed diffusion. Flow  volume loops are slightly flattened. Expiratory loop could have a  variable intrathoracic obstruction. methacholine challenge    DATE OF PROCEDURE:  05/25/2022     METHACHOLINE CHALLENGE     Five doses of incremental increasing doses of methacholine were given. There was no drop in FEV1 less than 20%. IMPRESSION:  Negative methacholine challenge. Echo of the heart b/c of issus of long term amphetamine and dizziness and shortness breath. Conclusions      Summary   Normal left ventricle systolic function with an estimated ejection fraction   of 55%. Definity contrast administered with no evidence of left ventricular mass or   thrombus noted. No regional wall motion abnormalities are seen. Grade I diastolic dysfunction with normal filing pressure. No significant valvular heart disease noted.         GERD   Needs to see GI  For potential gi ulcers  Protonix 40mg once a day     RTC in 6 weeks.                       Thank you for allowing me to assist in the care of the MD Nakia Mabry MD
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
## 2022-09-13 NOTE — PATIENT INSTRUCTIONS
ASSESSMENT/PLAN:   Diagnosis Orders   1. Cataplexy and narcolepsy        2. DARIEL (obstructive sleep apnea)        3. Continuous tobacco abuse        4. Shortness of breath        5. Class 2 obesity with body mass index (BMI) of 38.0 to 38.9 in adult, unspecified obesity type, unspecified whether serious comorbidity present        6. Tobacco abuse          OF NOTE: Stephanie narcolepsy is very severe and has lead to be on social security and incapable of self support     Still having RBD a, crying and dreaming more at night, now also having walking out of the bedroom at night at times, fell down steps     Having cataplexy, still about the same, getting this about 2-3 times a day     Tried and failed with: nortryptiline and xyrem (tried 2 times)   Tried and failed with Wakix, had side effects, adderall, vyvanse,   Had reaction now a side effect of tongue drying  Adderall 30 mg twice a day    Weight is at 247 from 250 from 243 from  200 from 191 and was 216 coming down  Will send to gastric surgeon for weight loss         Continue with:  Klonopin 2 mg at night   Melatonin 20 mg at night  Amitriptyline 50 mg at night  Ritalin 20 mg Three times- will try to back off later after the dariel has been treated        Stress is a trigger    Now having cataplexy at about 3 times a day  And the arms are very hard to move        DARIEL        Still waiting for autopap  [facility]  Will need to see if this has come in      I have contacted the company    Smoking  Started back on  But will stop soon        LDCT scan done 3/1/22     No suspicious nodules. No acute thoracic findings. Areas of mild scattered atelectasis/scarring throughout the lungs. LUNG-RADS CATEGORY:   1: No nodules or definitely benign nodules.  (Nodule/s with specific calcifications: complete, central, popcorn, concentric rings and fat containing nodules)         MODIFIER: None     NODULE RECOMMENDATION:  Category 1 or 2: Continue annual low dose CT lung cancer screening. Patient should return in one year for a follow-up exam         covid vaccination x 2,       Shortness of breath    continue  Albuterol 2 puffs as needed, can use every 6 hours          PFT   DATE OF PROCEDURE:  05/25/2022     Full PFT done. FEV1 of 2.49, 78% predicted, FVC of 3.53, 88% predicted,  FEV1 to FVC ratio was 70% showing no obstructive lung defect. Lung  volumes do not show air trapping, hyperinflation, or restrictive lung  defect. Diffusion is normal when adjusted for alveolar ventilation. IMPRESSION:  Normal spirometry. Lung volumes showed diffusion. Flow  volume loops are slightly flattened. Expiratory loop could have a  variable intrathoracic obstruction. methacholine challenge    DATE OF PROCEDURE:  05/25/2022     METHACHOLINE CHALLENGE     Five doses of incremental increasing doses of methacholine were given. There was no drop in FEV1 less than 20%. IMPRESSION:  Negative methacholine challenge. Echo of the heart b/c of issus of long term amphetamine and dizziness and shortness breath. Conclusions      Summary   Normal left ventricle systolic function with an estimated ejection fraction   of 55%. Definity contrast administered with no evidence of left ventricular mass or   thrombus noted. No regional wall motion abnormalities are seen. Grade I diastolic dysfunction with normal filing pressure. No significant valvular heart disease noted. GERD   Needs to see GI  For potential gi ulcers  Protonix 40mg once a day     RTC in 6 weeks. Remember to bring a list of pulmonary medications and any CPAP or BiPAP machines to your next appointment with the office. Please keep all of your future appointments scheduled by [facility] [address] [facility] office. Out of respect for other patients and providers, you may be asked to reschedule your appointment if you arrive later than your scheduled appointment time. Appointments cancelled less than 24hrs in advance will be considered a no show. Patients with three missed appointments within 1 year or four missed appointments within 2 years can be dismissed from the practice. Please be aware that our physicians are required to work in the Intensive Care Unit at [facility].  Your appointment may need to be rescheduled if they are designated to work during your appointment time. You may receive a survey regarding the care you received during your visit. Your input is valuable to us. We encourage you to complete and return your survey. We hope you will choose us in the future for your healthcare needs. Pt instructed of all future appointment dates & times, including radiology, labs, procedures & referrals. If procedures were scheduled preparation instructions provided. Instructions on future appointments with [facility] were given.

## 2022-09-13 NOTE — PROGRESS NOTES
MA Communication:   The following orders are received by verbal communication from Jair Heart MD    Orders include:  6 wk fu scheduled 10/20/22

## 2022-09-14 ENCOUNTER — ANESTHESIA EVENT (OUTPATIENT)
Dept: ENDOSCOPY | Age: 52
End: 2022-09-14
Payer: MEDICARE

## 2022-09-14 ENCOUNTER — TELEPHONE (OUTPATIENT)
Dept: PULMONOLOGY | Age: 52
End: 2022-09-14

## 2022-09-14 RX ORDER — CLONAZEPAM 0.5 MG/1
0.25 TABLET ORAL NIGHTLY PRN
COMMUNITY

## 2022-09-14 NOTE — PROGRESS NOTES
vision between now and the time of your surgery. To provide excellent care, visitors will be limited to two in a room at any given time. Please no children under the age of 15 in the surgical department.

## 2022-09-14 NOTE — PROGRESS NOTES
PAT completed with patient orders placed per MD, patient states her mother Destinee Lakhani will be her  and caregiver day of surgery. Aliyah Aceves RN

## 2022-09-15 ENCOUNTER — HOSPITAL ENCOUNTER (OUTPATIENT)
Age: 52
Setting detail: OUTPATIENT SURGERY
Discharge: HOME | End: 2022-09-15
Attending: INTERNAL MEDICINE | Admitting: INTERNAL MEDICINE
Payer: MEDICARE

## 2022-09-15 ENCOUNTER — ANESTHESIA (OUTPATIENT)
Dept: ENDOSCOPY | Age: 52
End: 2022-09-15
Payer: MEDICARE

## 2022-09-15 VITALS
HEART RATE: 82 BPM | TEMPERATURE: 97.1 F | DIASTOLIC BLOOD PRESSURE: 93 MMHG | SYSTOLIC BLOOD PRESSURE: 145 MMHG | BODY MASS INDEX: 38.04 KG/M2 | HEIGHT: 68 IN | WEIGHT: 251 LBS | RESPIRATION RATE: 14 BRPM | OXYGEN SATURATION: 95 %

## 2022-09-15 DIAGNOSIS — K92.1 MELENA: ICD-10-CM

## 2022-09-15 PROCEDURE — 3700000001 HC ADD 15 MINUTES (ANESTHESIA): Performed by: INTERNAL MEDICINE

## 2022-09-15 PROCEDURE — 2709999900 HC NON-CHARGEABLE SUPPLY: Performed by: INTERNAL MEDICINE

## 2022-09-15 PROCEDURE — 88305 TISSUE EXAM BY PATHOLOGIST: CPT

## 2022-09-15 PROCEDURE — 3700000000 HC ANESTHESIA ATTENDED CARE: Performed by: INTERNAL MEDICINE

## 2022-09-15 PROCEDURE — 88342 IMHCHEM/IMCYTCHM 1ST ANTB: CPT

## 2022-09-15 PROCEDURE — 2500000003 HC RX 250 WO HCPCS: Performed by: ANESTHESIOLOGY

## 2022-09-15 PROCEDURE — 2500000003 HC RX 250 WO HCPCS: Performed by: NURSE ANESTHETIST, CERTIFIED REGISTERED

## 2022-09-15 PROCEDURE — 7100000011 HC PHASE II RECOVERY - ADDTL 15 MIN: Performed by: INTERNAL MEDICINE

## 2022-09-15 PROCEDURE — 6360000002 HC RX W HCPCS: Performed by: NURSE ANESTHETIST, CERTIFIED REGISTERED

## 2022-09-15 PROCEDURE — 3609012400 HC EGD TRANSORAL BIOPSY SINGLE/MULTIPLE: Performed by: INTERNAL MEDICINE

## 2022-09-15 PROCEDURE — 7100000010 HC PHASE II RECOVERY - FIRST 15 MIN: Performed by: INTERNAL MEDICINE

## 2022-09-15 RX ORDER — FAMOTIDINE 10 MG/ML
20 INJECTION, SOLUTION INTRAVENOUS ONCE
Status: CANCELLED | OUTPATIENT
Start: 2022-09-15 | End: 2022-09-15

## 2022-09-15 RX ORDER — SODIUM CHLORIDE 9 MG/ML
INJECTION, SOLUTION INTRAVENOUS PRN
Status: DISCONTINUED | OUTPATIENT
Start: 2022-09-15 | End: 2022-09-15 | Stop reason: HOSPADM

## 2022-09-15 RX ORDER — FAMOTIDINE 10 MG/ML
20 INJECTION, SOLUTION INTRAVENOUS ONCE
Status: COMPLETED | OUTPATIENT
Start: 2022-09-15 | End: 2022-09-15

## 2022-09-15 RX ORDER — GLYCOPYRROLATE 0.2 MG/ML
INJECTION INTRAMUSCULAR; INTRAVENOUS PRN
Status: DISCONTINUED | OUTPATIENT
Start: 2022-09-15 | End: 2022-09-15 | Stop reason: SDUPTHER

## 2022-09-15 RX ORDER — LIDOCAINE HYDROCHLORIDE 10 MG/ML
INJECTION, SOLUTION INFILTRATION; PERINEURAL PRN
Status: DISCONTINUED | OUTPATIENT
Start: 2022-09-15 | End: 2022-09-15 | Stop reason: SDUPTHER

## 2022-09-15 RX ORDER — SODIUM CHLORIDE 9 MG/ML
INJECTION, SOLUTION INTRAVENOUS PRN
Status: CANCELLED | OUTPATIENT
Start: 2022-09-15

## 2022-09-15 RX ORDER — SODIUM CHLORIDE, SODIUM LACTATE, POTASSIUM CHLORIDE, CALCIUM CHLORIDE 600; 310; 30; 20 MG/100ML; MG/100ML; MG/100ML; MG/100ML
INJECTION, SOLUTION INTRAVENOUS CONTINUOUS
Status: DISCONTINUED | OUTPATIENT
Start: 2022-09-15 | End: 2022-09-15 | Stop reason: HOSPADM

## 2022-09-15 RX ORDER — SODIUM CHLORIDE 0.9 % (FLUSH) 0.9 %
5-40 SYRINGE (ML) INJECTION PRN
Status: CANCELLED | OUTPATIENT
Start: 2022-09-15

## 2022-09-15 RX ORDER — SODIUM CHLORIDE 0.9 % (FLUSH) 0.9 %
5-40 SYRINGE (ML) INJECTION EVERY 12 HOURS SCHEDULED
Status: CANCELLED | OUTPATIENT
Start: 2022-09-15

## 2022-09-15 RX ORDER — PROPOFOL 10 MG/ML
INJECTION, EMULSION INTRAVENOUS PRN
Status: DISCONTINUED | OUTPATIENT
Start: 2022-09-15 | End: 2022-09-15 | Stop reason: SDUPTHER

## 2022-09-15 RX ORDER — SODIUM CHLORIDE 0.9 % (FLUSH) 0.9 %
5-40 SYRINGE (ML) INJECTION EVERY 12 HOURS SCHEDULED
Status: DISCONTINUED | OUTPATIENT
Start: 2022-09-15 | End: 2022-09-15 | Stop reason: HOSPADM

## 2022-09-15 RX ORDER — SODIUM CHLORIDE 0.9 % (FLUSH) 0.9 %
5-40 SYRINGE (ML) INJECTION PRN
Status: DISCONTINUED | OUTPATIENT
Start: 2022-09-15 | End: 2022-09-15 | Stop reason: HOSPADM

## 2022-09-15 RX ORDER — SODIUM CHLORIDE, SODIUM LACTATE, POTASSIUM CHLORIDE, CALCIUM CHLORIDE 600; 310; 30; 20 MG/100ML; MG/100ML; MG/100ML; MG/100ML
INJECTION, SOLUTION INTRAVENOUS CONTINUOUS
Status: CANCELLED | OUTPATIENT
Start: 2022-09-15

## 2022-09-15 RX ADMIN — PROPOFOL 30 MG: 10 INJECTION, EMULSION INTRAVENOUS at 09:54

## 2022-09-15 RX ADMIN — PROPOFOL 30 MG: 10 INJECTION, EMULSION INTRAVENOUS at 09:50

## 2022-09-15 RX ADMIN — PROPOFOL 30 MG: 10 INJECTION, EMULSION INTRAVENOUS at 09:56

## 2022-09-15 RX ADMIN — LIDOCAINE HYDROCHLORIDE 50 MG: 10 INJECTION, SOLUTION INFILTRATION; PERINEURAL at 09:44

## 2022-09-15 RX ADMIN — PROPOFOL 30 MG: 10 INJECTION, EMULSION INTRAVENOUS at 09:52

## 2022-09-15 RX ADMIN — PROPOFOL 70 MG: 10 INJECTION, EMULSION INTRAVENOUS at 09:48

## 2022-09-15 RX ADMIN — FAMOTIDINE 20 MG: 10 INJECTION INTRAVENOUS at 09:09

## 2022-09-15 RX ADMIN — GLYCOPYRROLATE 0.2 MG: 0.2 INJECTION, SOLUTION INTRAMUSCULAR; INTRAVENOUS at 09:44

## 2022-09-15 ASSESSMENT — ENCOUNTER SYMPTOMS: SHORTNESS OF BREATH: 1

## 2022-09-15 ASSESSMENT — LIFESTYLE VARIABLES: SMOKING_STATUS: 1

## 2022-09-15 ASSESSMENT — PAIN - FUNCTIONAL ASSESSMENT: PAIN_FUNCTIONAL_ASSESSMENT: NONE - DENIES PAIN

## 2022-09-15 NOTE — OP NOTE
[address] [place] [address] [address], [address] [facility], [address] [place] [address]  Phone: [phone]  [address] [place] [address]  Phone: [phone]   IOE:[phone]    EGD Procedure Note    Patient: Kaylah Bello  : 1970    Procedure: Esophagogastroduodenoscopy with biopsy    Date:  9/15/2022     Endoscopist:  Debbie Kent MD    Referring Physician:  Jd Monterroso MD    Preoperative Diagnosis:  Melena [K92.1]    Postoperative Diagnosis: Antral ulcer, irregular Z-line, gastritis    Anesthesia: Anesthesia: MAC  Sedation: Propofol per anesthesia  Start Time:   Stop Time:   ASA Class: 3  Mallampati: II (soft palate, uvula, fauces visible)    Indications: This is a 47y.o. year old female with medical history of obesity (BMI 38.1), obstructive sleep apnea, tobacco use, cataplexy and narcolepsy seen independently with referral for melena of 2 months duration. Associated with burning epigastric pain, burping and sensation of food stuck. Labs on 2022 noted stable hemoglobin 13.9, hematocrit 40.7. Procedure Details  Informed consent was obtained for the procedure, including conscious sedation. Risks of pancreatitis, infection, perforation, hemorrhage, adverse drug reaction and aspiration were discussed. The patient was placed in the left lateral decubitus position. Based on the pre-procedure assessment, including review of the patient's medical history, medications, allergies, and review of systems, she had been deemed to be an appropriate candidate for the above IV sedation; she was therefore sedated with the medications listed above. She was monitored continuously with ECG tracing, pulse oximetry, blood pressure monitoring, and direct observation. A gastroscope was inserted into the mouth and advanced under direct vision to third portion of the duodenum.   A careful inspection was made as the gastroscope was withdrawn, including a retroflexed view of the proximal stomach including views of the incisura and cardia; findings and interventions are described below. Appropriate photodocumentation Was Obtained. If photos taken, they were ordered to be scanned into the medical record. Findings:  Normal upper and mid esophagus. Biopsies taken from the mid and distal esophagus to evaluate for eosinophilic esophagitis. Irregular Z-line at 38 cm from incisors  A 6 mm clean-based ulcer [facility])  in the antrum. Biopsies taken for pathology and evaluate for H. pylori. Mild erythematous mucosa in antrum and body of stomach suggestive of mild gastritis. Normal duodenal bulb and second portion of duodenum. Specimens: Was Obtained:     Complications:   None; patient tolerated the procedure well. Disposition:   PACU - hemodynamically stable. Estimated Blood loss:  none    Impression:   Normal upper and mid esophagus. Biopsied  Irregular Z-line at 38 cm from incisors  A 6 mm clean-based ulcer [facility])  in the antrum. Biopsied  Mild gastritis. Normal duodenal bulb and second portion of duodenum. Recommendations:  -Continue Protonix 40 mg orally daily, 30 minutes before meals  -Await pathology. ,   -Avoid NSAIDs  -Follow symptoms. ,   -Follow up with me in 6-8 weeks. Keyana Aceves MD   [facility]  9/15/22 10:01 AM EDT    Please note that some or all of this record was generated using voice recognition software. If there are any questions about the content of this document, please contact the author as some errors in translation may have occurred.

## 2022-09-15 NOTE — H&P
[address] [facility] [address],  [address], [address] [facility]  Phone: [phone]  [facility]  Phone: [phone]   Jairon JONES     Thank you Bonnie Novak MD for asking me to see Zheng Lopes in consultation. Zheng Lopes is a 47 y.o. female Single [1] White (non-) [1] with medical history of obesity (BMI 38.1), obstructive sleep apnea, tobacco use, cataplexy and narcolepsy seen independently with referral for melena of 2 months duration. Associated with burning epigastric pain, burping and sensation of food stuck. Denies nausea, vomiting, odynophagia, fever, chills, constipation, diarrhea, hematochezia. Denies chronic NSAID use, but reports chronic stress. Last EGD: none  Last Colonoscopy: 6/12/22; normal colonoscopy. Repeat in 5 years given history of colon polyps    Review of available records reveals:   Wt Readings from Last 50 Encounters:   09/14/22 251 lb (113.9 kg)   09/13/22 247 lb (112 kg)   07/07/22 251 lb (113.9 kg)   04/15/22 243 lb (110.2 kg)   07/06/21 224 lb 13.9 oz (102 kg)   04/16/21 250 lb (113.4 kg)   01/04/16 250 lb (113.4 kg)   02/27/15 240 lb 3.2 oz (109 kg)   06/06/13 253 lb 3.2 oz (114.9 kg)   05/22/13 251 lb 12.8 oz (114.2 kg)   12/07/12 246 lb (111.6 kg)   11/28/12 245 lb (111.1 kg)   07/09/12 243 lb 12.8 oz (110.6 kg)   02/20/12 236 lb (107 kg)   05/02/11 240 lb (108.9 kg)   12/29/10 238 lb (108 kg)       No components found for: HGBA1C  BP Readings from Last 3 Encounters:   09/15/22 132/84   09/13/22 (!) 145/91   07/07/22 (!) 140/103     Health Maintenance   Topic Date Due    Annual Wellness Visit (AWV)  Never done    Pneumococcal 0-64 years Vaccine (1 - PCV) Never done    Depression Screen  Never done    Hepatitis C screen  Never done    Cervical cancer screen  Never done    Diabetes screen  Never done    Lipids  Never done    Shingles vaccine (1 of 2) Never done COVID-19 Vaccine (3 - Booster for Pfizer series) 03/07/2022    Flu vaccine (1) Never done    Breast cancer screen  07/27/2024    DEXA (modify frequency per FRAX score)  01/02/2026    DTaP/Tdap/Td vaccine (2 - Td or Tdap) 09/18/2028    Colorectal Cancer Screen  06/13/2032    HIV screen  Completed    Hepatitis A vaccine  Aged Out    Hepatitis B vaccine  Aged Out    Hib vaccine  Aged Out    Meningococcal (ACWY) vaccine  Aged Out       No components found for: [facility]     PAST MEDICAL HISTORY     Past Medical History:   Diagnosis Date    Anxiety     Atrophic kidney     left    Cancer ([facility] [address]     skin (basal cell)    Cellulitis     recurrent    Cervical disc herniation     Endometriosis     Lumbar disc disorder 02/21/2012    Migraines     Narcolepsy     Nephrolithiasis 02/21/2012    Ovarian cyst 02/21/2012     FAMILY HISTORY     Family History   Problem Relation Age of Onset    Cancer Mother         skin    Cancer Sister         skin    Diabetes Sister     Diabetes Maternal Aunt     Hypertension Father     Hypertension Sister      SOCIAL HISTORY     Social History     Socioeconomic History    Marital status: Single     Spouse name: Not on file    Number of children: Not on file    Years of education: Not on file    Highest education level: Not on file   Occupational History    Not on file   Tobacco Use    Smoking status: Every Day     Packs/day: 0.50     Years: 20.00     Pack years: 10.00     Types: Cigarettes     Start date: 5/1/2022    Smokeless tobacco: Never    Tobacco comments:     9/13/22 - smokes 1/2 ppd   Vaping Use    Vaping Use: Former    Substances: Nicotine    Devices: Disposable   Substance and Sexual Activity    Alcohol use:  Yes     Alcohol/week: 0.0 standard drinks     Comment: 5 glasses wine/mo    Drug use: No    Sexual activity: Not on file   Other Topics Concern    Not on file   Social History Narrative    Not on file     Social Determinants of Health     Financial Resource Strain: Not on file Food Insecurity: Not on file   Transportation Needs: Not on file   Physical Activity: Not on file   Stress: Not on file   Social Connections: Not on file   Intimate Partner Violence: Not on file   Housing Stability: Not on file     SURGICAL HISTORY     Past Surgical History:   Procedure Laterality Date    FOOT SURGERY  12/27/2012    [address] LEFT, PARTIAL EXCISION OF BONE    LAPAROSCOPY      for endometriosis    LUMBAR FUSION  2008    SHOULDER SURGERY      left    TONSILLECTOMY  2006     CURRENT MEDICATIONS   (This list may include medications prescribed during this encounter as epic can not insert only the list prior to this encounter.)    ALLERGIES     Allergies   Allergen Reactions    Codeine Hives    Morphine And Related Other (See Comments) and Rash     ineffective      Percocet [Oxycodone-Acetaminophen] Itching    Prednisone     Tetracyclines & Related      IMMUNIZATIONS     Immunization History   Administered Date(s) Administered    COVID-19, PFIZER PURPLE top, DILUTE for use, (age 15 y+), 30mcg/0.3mL 09/13/2021, 10/07/2021     REVIEW OF SYSTEMS   See HPI for further details and pertinent postiives. Negative for the following:  Constitutional: Negative for weight change. Negative for appetite change and fatigue. HENT: Negative for nosebleeds, sore throat, mouth sores, and voice change. Respiratory: Negative for cough, choking and chest tightness. Cardiovascular: Negative for chest pain   Gastrointestinal: See HPI  Musculoskeletal: Negative for arthralgias. Skin: Negative for pallor. Neurological: Negative for weakness and light-headedness. Hematological: Negative for adenopathy. Does not bruise/bleed easily. Psychiatric/Behavioral: Negative for suicidal ideas.    PHYSICAL EXAM   VITAL SIGNS: /84   Pulse 87   Temp 98.4 °F (36.9 °C) (Infrared)   Resp 16   Ht 5' 8\" (1.727 m)   Wt 251 lb (113.9 kg)   LMP 11/22/2012   SpO2 98%   BMI 38.16 kg/m²   Wt Readings from Last 3 Encounters:   09/14/22 251 lb (113.9 kg)   09/13/22 247 lb (112 kg)   07/07/22 251 lb (113.9 kg)     Constitutional: obese. well developed, Well nourished, No acute distress, Non-toxic appearance. HENT: Normocephalic, Atraumatic, Bilateral external ears normal, Oropharynx moist, No oral exudates, Nose normal.   Eyes: Conjunctiva normal, No discharge. Neck: Normal range of motion, No tenderness, Supple, No stridor. Lymphatic: No cervical, subclavian, or axillary lymphadenopathy. Cardiovascular: Normal heart rate, Normal rhythm, No murmurs, No rubs, No gallops. Thorax & Lungs: Normal breath sounds, No respiratory distress, No wheezing, No chest tenderness. Abdomen: Pannus abdomen, normal bowel sounds, soft, non tender, non distended, no hernias   Rectal:  Deferred. Skin: Warm, Dry, No erythema, No rash. No bruising. Lower Extremities: Intact distal pulses, No edema, No tenderness, No cyanosis, No clubbing. Neurologic: Alert & oriented x 3, Normal motor function, Normal sensory function, No focal deficits noted. RADIOLOGY/PROCEDURES   No results found. FINAL IMPRESSION   Melana; Differential diagnosis includes but not limited to esophagitis, peptic ulcer disease, medication induced ulceration, gastritis, neoplasia, arterio-venous malformations, Dieulafoy lesion, Tan's lesion, varices, portal gastropathy, and Page Espinosa Tear. Plan:   Hemoglobin and hematocrit remained stable at present. Continue Protonix 40 mg orally daily   Plan for EGD with bleeding control. Esophagogastroduodenoscopy (EGD) with alternatives, rationale, benefits and risks, not limited to bleeding, infection, perforation, need of surgery, prolong hospital stay and anesthesia side effects were explained. Patient verbalized understanding and agrees to proceed with EGD.          Orders Placed This Encounter   Procedures    Endo procedure     EGD     Standing Status:   Standing     Number of Occurrences:   1 Verify informed consent     EGD     Standing Status:   Standing     Number of Occurrences:   1    Vital signs per unit routine     Standing Status:   Standing     Number of Occurrences:   1    Notify physician for     Notify physician for pulse less than 40 or greater than 120, respiratory rate less than 12 or greater than 25, temperature greater than 101.3 F (14.8 C), systolic BP less than 90 or greater than 020, diastolic BP less than 50 or greater than 100. Standing Status:   Standing     Number of Occurrences:   1    Initiate Oxygen Therapy Protocol     - If patient has any of the following conditions, initiate oxygen therapy: SpO2 less than 92%, Cyanosis, Chest Pain, Dyspnea, Home oxygen, or Altered level of consciousness    - If oxygen therapy initiated, enter the RT51 Nasal cannula oxygen order using Per Protocol order mode using the defaulted order parameters and titrate as specified in that order    - If oxygen therapy initiated, notify provider         Standing Status:   Standing     Number of Occurrences:   4    Pregnancy, urine POCT     Standing Status:   Standing     Number of Occurrences:   1    Insert peripheral IV     Standing Status:   Standing     Number of Occurrences:   1    Discharge patient     Standing Status:   Standing     Number of Occurrences:   1     Order Specific Question:   Discharge Disposition     Answer:   Home       ORDERED FUTURE/PENDING TESTS     Lab Frequency Next Occurrence   Drug Screen, Multiple, Urine Once 07/07/2022   Initiate Oxygen Therapy Protocol DAILY (RT)        FOLLOWUP   No follow-ups on file.           Urvashi Vera MD 9/15/22 9:44 AM EDT    CC:  Faye Mcarthur MD

## 2022-09-15 NOTE — ANESTHESIA PRE PROCEDURE
IntraVENous 2 times per day Binta Zuñiga MD        sodium chloride flush 0.9 % injection 5-40 mL  5-40 mL IntraVENous PRN Binta Zuñiga MD        0.9 % sodium chloride infusion   IntraVENous PRN Binta Zuñiga MD           Allergies: Allergies   Allergen Reactions    Codeine Hives    Morphine And Related Other (See Comments) and Rash     ineffective      Percocet [Oxycodone-Acetaminophen] Itching    Prednisone     Tetracyclines & Related        Problem List:    Patient Active Problem List   Diagnosis Code    Narcolepsy G47.419    Bilateral leg edema R60.0    Atrophic kidney N26.1    Cellulitis L03.90    Tobacco use disorder F17.200    Endometriosis N80.9    Ovarian cyst N83.209    Nephrolithiasis N20.0    Lumbar disc disorder M51.9    Fatigue R53.83    Pain, pelvic, female R10.2    Calf pain M79.669    Cataplexy and narcolepsy G47.411       Past Medical History:        Diagnosis Date    Anxiety     Atrophic kidney     left    Cancer (HCC)     skin (basal cell)    Cellulitis     recurrent    Cervical disc herniation     Endometriosis     Lumbar disc disorder 02/21/2012    Migraines     Narcolepsy     Nephrolithiasis 02/21/2012    Ovarian cyst 02/21/2012       Past Surgical History:        Procedure Laterality Date    FOOT SURGERY  12/27/2012    BROSTROM ANKLE LIGAMENT REPAIR LEFT, PARTIAL EXCISION OF BONE    LAPAROSCOPY      for endometriosis    LUMBAR FUSION  2008    SHOULDER SURGERY      left    TONSILLECTOMY  2006       Social History:    Social History     Tobacco Use    Smoking status: Every Day     Packs/day: 0.50     Years: 20.00     Pack years: 10.00     Types: Cigarettes     Start date: 5/1/2022    Smokeless tobacco: Never    Tobacco comments:     9/13/22 - smokes 1/2 ppd   Substance Use Topics    Alcohol use:  Yes     Alcohol/week: 0.0 standard drinks     Comment: 5 glasses wine/mo                                Ready to quit: Not Answered  Counseling given: Not Answered  Tobacco comments: 9/13/22 - smokes 1/2 ppd      Vital Signs (Current):   Vitals:    09/14/22 1102 09/15/22 0855   BP:  132/84   Pulse:  87   Resp:  16   Temp:  98.4 °F (36.9 °C)   TempSrc:  Infrared   SpO2:  98%   Weight: 251 lb (113.9 kg)    Height: 5' 8\" (1.727 m)                                               BP Readings from Last 3 Encounters:   09/15/22 132/84   09/13/22 (!) 145/91   07/07/22 (!) 140/103       NPO Status: Time of last liquid consumption: 2300                        Time of last solid consumption: 2000                        Date of last liquid consumption: 09/14/22                        Date of last solid food consumption: 09/14/22    BMI:   Wt Readings from Last 3 Encounters:   09/14/22 251 lb (113.9 kg)   09/13/22 247 lb (112 kg)   07/07/22 251 lb (113.9 kg)     Body mass index is 38.16 kg/m². CBC:   Lab Results   Component Value Date/Time    WBC 8.3 08/19/2019 12:36 AM    RBC 4.22 08/19/2019 12:36 AM    HGB 13.9 09/13/2022 12:01 PM    HCT 40.7 09/13/2022 12:01 PM    MCV 94.9 08/19/2019 12:36 AM    RDW 12.8 08/19/2019 12:36 AM     08/19/2019 12:36 AM       CMP:   Lab Results   Component Value Date/Time     08/19/2019 12:36 AM    K 3.5 08/19/2019 12:36 AM     08/19/2019 12:36 AM    CO2 23 08/19/2019 12:36 AM    BUN 8 08/19/2019 12:36 AM    CREATININE 0.6 08/19/2019 12:36 AM    GFRAA >60 08/19/2019 12:36 AM    GFRAA 124 12/11/2011 04:00 AM    AGRATIO 1.4 08/19/2019 12:36 AM    LABGLOM >60 08/19/2019 12:36 AM    GLUCOSE 96 08/19/2019 12:36 AM    PROT 7.2 08/19/2019 12:36 AM    CALCIUM 9.1 08/19/2019 12:36 AM    BILITOT 0.7 08/19/2019 12:36 AM    ALKPHOS 69 08/19/2019 12:36 AM    AST 17 08/19/2019 12:36 AM    ALT 9 08/19/2019 12:36 AM       POC Tests: No results for input(s): POCGLU, POCNA, POCK, POCCL, POCBUN, POCHEMO, POCHCT in the last 72 hours.     Coags: No results found for: PROTIME, INR, APTT    HCG (If Applicable):   Lab Results   Component Value Date    PREGTESTUR Negative 12/27/2012        ABGs: No results found for: PHART, PO2ART, NVY5RTJ, HSW7QMB, BEART, D0YXGQES     Type & Screen (If Applicable):  No results found for: LABABO, LABRH    Drug/Infectious Status (If Applicable):  No results found for: HIV, HEPCAB    COVID-19 Screening (If Applicable):   Lab Results   Component Value Date/Time    COVID19 Not Detected 04/30/2020 08:16 PM           Anesthesia Evaluation  Patient summary reviewed history of anesthetic complications (SLOW EMERGE SEC TO NARCOLEPSY):   Airway: Mallampati: II  TM distance: >3 FB   Neck ROM: full  Mouth opening: > = 3 FB   Dental:          Pulmonary: breath sounds clear to auscultation  (+) shortness of breath:  sleep apnea: on CPAP,  current smoker (1/2 PPD)    (-) COPD and asthma          Patient smoked on day of surgery. Cardiovascular:    (+) PICKARD:,     (-) pacemaker, hypertension, past MI, CAD, CABG/stent, dysrhythmias,  angina and  CHF    ECG reviewed  Rhythm: regular  Rate: normal  Echocardiogram reviewed         Beta Blocker:  Not on Beta Blocker         Neuro/Psych:   (+) neuromuscular disease (LBP W/ BLE RAD SXS, S/P SURG // DDD NECK W/ RUE RAD SXS // NARCOLEPSY):, headaches: migraine headaches, depression/anxiety  (ANX.)   (-) seizures, TIA and CVA           GI/Hepatic/Renal:   (+) GERD: poorly controlled, liver disease (FATTY):, renal disease (ATROPHIC L KIDNEY): kidney stones,      (-) bowel prep and no morbid obesity       Endo/Other:    (+) : arthritis:., malignancy/cancer (SKIN). (-) diabetes mellitus, hypothyroidism, hyperthyroidism, blood dyscrasia               Abdominal:       Abdomen: soft. Vascular: negative vascular ROS. Other Findings:           Anesthesia Plan      TIVA     ASA 2       Induction: intravenous. MIPS: Prophylactic antiemetics administered. Anesthetic plan and risks discussed with patient.       Plan discussed with CRNA.                This pre-anesthesia assessment may be used as a history and physical.    DOS STAFF ADDENDUM:    Pt seen and examined, chart reviewed (including anesthesia, drug and allergy history). No interval changes to history and physical examination. Anesthetic plan, risks, benefits, alternatives, and personnel involved discussed with patient. Patient verbalized an understanding and agrees to proceed.       Maurice Gregory MD  September 15, 2022  9:17 AM      Maurice Gregory MD   9/15/2022

## 2022-09-15 NOTE — ANESTHESIA POSTPROCEDURE EVALUATION
Department of Anesthesiology  Postprocedure Note    Patient: Rufus Ortega  MRN: [id number]  YOB: 1970  Date of evaluation: 9/15/2022      Procedure Summary     Date: 09/15/22 Room / Location: [place] [address] [address] / [facility]    Anesthesia Start: 5940 Anesthesia Stop: 9390    Procedure: EGD BIOPSY Diagnosis:       Melena      (MELENA)    Surgeons: Vaisle Sanchez MD Responsible Provider: Terrell Lee MD    Anesthesia Type: TIVA ASA Status: 2          Anesthesia Type: No value filed.     Linnea Phase I: Linnea Score: 10    Linnea Phase II: Linnea Score: 9  Vitals:    09/14/22 1102 09/15/22 0855 09/15/22 1002 09/15/22 1013   BP:  132/84 (!) 145/91 (!) 145/93   Pulse:  87 80 82   Resp:  16 14 14   Temp:  98.4 °F (36.9 °C) 97.1 °F (36.2 °C)    TempSrc:  Infrared Temporal    SpO2:  98% 100% 95%   Weight: 251 lb (113.9 kg)      Height: 5' 8\" (1.727 m)          Anesthesia Post Evaluation    Patient location during evaluation: bedside  Patient participation: complete - patient participated  Level of consciousness: awake and alert  Airway patency: patent  Nausea & Vomiting: no nausea  Complications: no  Cardiovascular status: hemodynamically stable  Respiratory status: acceptable  Hydration status: euvolemic

## 2022-09-15 NOTE — DISCHARGE INSTRUCTIONS
PATIENT INSTRUCTIONS  POST-SEDATION    Ines Rojas          IMMEDIATELY FOLLOWING PROCEDURE:    Do not drive or operate machinery for the first twenty four hours after surgery. Do not make any important decisions for twenty four hours after surgery or while taking narcotic pain medications or sedatives. You should NOT BE LEFT UNATTENDED OR ALONE. A responsible adult should be with you for the rest of the day of your procedure and also during the night for your protection and safety. You may experience some light headedness. Rest at home with activity as tolerated. You may not need to go to bed, but it is important to rest for the next 24 hours. You should not engage in athletic sports such as basketball, volleyball, jogging, skating, or activities requiring refined motor skills for 24 hours. If you develop intractable nausea and vomiting or a severe headache please notify your doctor immediately. You are not expected to have any fever, but if you feel warm, take your temperature. If you have a fever 101 degrees or higher, call your doctor. If you have had an Endoscopy:   *Eat lightly for your first meal and gradually resume your normal / prescribed diet. DO NOT eat or drink until your gag reflex returns. *If you have a sore throat you may use lozenges, or salt water gargles. ONCE YOU ARE HOME, IF YOU SHOULD HAVE:  Difficulty in breathing, persistent nausea or vomiting, bleeding you feel is excessive, or pain that is unusual, increased abdominal bloating, or any swelling, fever / chills, call your physician. If you cannot contact your physician, but feel that your signs and symptoms need a physician's attention, go to the Emergency Department. FOLLOW-UP:    Please follow up with Dr. Valentine Ye as scheduled or needed. Dr. Kamala Garduno office will call you with the biopsy findings. Call Dr. Rafa Beckham if there are any GI concerns.  [phone]

## 2022-09-28 DIAGNOSIS — R79.89 ABNORMAL LFTS: Primary | ICD-10-CM

## 2022-09-30 RX ORDER — AMITRIPTYLINE HYDROCHLORIDE 50 MG/1
50 TABLET, FILM COATED ORAL NIGHTLY
Qty: 90 TABLET | Refills: 1 | Status: SHIPPED | OUTPATIENT
Start: 2022-09-30

## 2022-10-06 DIAGNOSIS — G47.00 INSOMNIA, UNSPECIFIED TYPE: ICD-10-CM

## 2022-10-07 RX ORDER — CLONAZEPAM 1 MG/1
TABLET ORAL
Qty: 60 TABLET | Refills: 3 | OUTPATIENT
Start: 2022-10-07

## 2022-10-11 ENCOUNTER — HOSPITAL ENCOUNTER (OUTPATIENT)
Age: 52
Discharge: HOME | End: 2022-10-11
Payer: MEDICARE

## 2022-10-11 PROCEDURE — 97802 MEDICAL NUTRITION INDIV IN: CPT

## 2022-10-11 NOTE — PROGRESS NOTES
NUTRITION THERAPY ASSESSMENT     Referring Physician: Vinny Leach MD  PCP: Yvette Higginbotham MD  Referring diagnosis: Class 2 obesity     Robert Sotelo is a 47 y.o. female with PMHx of DARIEL, narcolepsy, NAFLD who was seen for nutrition education regarding weight loss, fatty liver, and Mediterranean diet. Pt states that she has gained weight over the past several years d/t life stress and loss of loved ones. Pt reports that she lives by herself and does not like to cook much. Pt reports that she typically will skip breakfast and will sometimes skip lunch as well. Pt will generally eat something quick such as a frozen meal, some vegetables over white rice, or eat out. Provided pt with written and verbal instruction on Mediterranean diet, NAFLD diet, and weight loss tips. Discussed incorporating more fruits, vegetables, whole grains, lean protein, and healthy fats. Encouraged pt to limit foods high in saturated fat, sodium, and added sugar. Discussed easy ways to create balanced meals and encouraged pt to try to eat something at breakfast, lunch and dinner. Pt reports that she has a stationary bike at home and she plans to start using it to get exercise. Pt set a goal to lose 20-25 lb over the next 5 months. OBJECTIVE DATA:  Past Medical History:   Diagnosis Date    Anxiety     Atrophic kidney     left    Cancer (HCC)     skin (basal cell)    Cellulitis     recurrent    Cervical disc herniation     Endometriosis     Lumbar disc disorder 02/21/2012    Migraines     Narcolepsy     Nephrolithiasis 02/21/2012    Ovarian cyst 02/21/2012       Labs:  No results for input(s): NA, K, CL, CO2, BUN, CREATININE, GLUCOSE, CA in the last 72 hours.   No results found for: PHOS    Lab Results   Component Value Date/Time    LABALBU 4.2 08/19/2019 12:36 AM      No results found for: TRIG, HDL, LDLCALC, LDLDIRECT, LABVLDL  No results found for: LABA1C  No results for input(s): AST, ALT, ALB, BILIDIR, BILITOT, ALKPHOS in the last 72 hours. Medications:   Vitamin E supplement    Anthropometric Measures   Height: 5' 8\"  Current Weight:   280 lb per pt  Last recorded wt of 251 lb on 9/14/22  Ideal Body Weight: 140  # +/- 10%  % Ideal Body Weight: 200%         BMI 42.6 (based on CBW of 280 lb)  >or equal to 40 Obese Class III    Wt Readings from Last 50 Encounters:   09/14/22 251 lb (113.9 kg)   09/13/22 247 lb (112 kg)   07/07/22 251 lb (113.9 kg)   04/15/22 243 lb (110.2 kg)   07/06/21 224 lb 13.9 oz (102 kg)   04/16/21 250 lb (113.4 kg)   01/04/16 250 lb (113.4 kg)   02/27/15 240 lb 3.2 oz (109 kg)   06/06/13 253 lb 3.2 oz (114.9 kg)   05/22/13 251 lb 12.8 oz (114.2 kg)   12/07/12 246 lb (111.6 kg)   11/28/12 245 lb (111.1 kg)   07/09/12 243 lb 12.8 oz (110.6 kg)   02/20/12 236 lb (107 kg)   05/02/11 240 lb (108.9 kg)   12/29/10 238 lb (108 kg)       Comparative Standards (not pertinent at this time)     Nutrition-focused Physical Findings           Bowel Pattern: Some constipation, sporadic   Skin:  no issues noted  Chewing / Swallowing:  no issues reported    Food/Nutrition-Related History  Home Diet: Regular diet  Home Supplements / Herbals: Vitamin E supplement, Beet supplement  Food Restrictions / Cultural Requests:   None reported  Food Allergies: None reported    Smoker: Yes, pt reports that she is trying to quit and is down to 3 cigarettes per day  Consumes Alcohol: No    Physical Activity  Pt reports that she has back issues and has trouble exercising. Pt reports that she has a stationary bike that she plans on using.      24 Hour Diet Recall  Breakfast:  - Usually skips breakfast    Lunch:  -  Sometimes eats out - likes salads, hamburgers, quesadillas  - Wrap - veggies in a tortilla  - Sometimes skips lunch    Dinner:  - Vegetables and white rice  - Freezer meal - prefers [place]    Snacks:  - Seaweed snacks  - A few candies    Beverages:  - Water   - Coke  - Occasionally black coffee with breakfast    NUTRITION DIAGNOSIS and GOAL  P: Overweight / obesity    E related to Knowledge deficit    S: as evidenced by BMI >40, un-balanced meals      Nutrition Intervention:  - Nutrition Education: Provided pt with instruction on Mediterranean diet, NAFLD diet, and weight loss tips. - Education Methods used: written and verbal     Individualized Treament Goals:  1. Eat a balanced meal at breakfast, lunch and dinner  2. Include a fruit or vegetable at each meal  3. Choose a meal prep day and plan to chop up ingredients for the week or batch cook    Ultimate goal to: Lose 20-25 lb over the next 5 months    Patient encouraged to call RD with any questions. RD available for follow up as needed.      Annie Ely RD, GABRIELE DAVIS, LD  Contact Number: [phone]

## 2022-10-20 ENCOUNTER — OFFICE VISIT (OUTPATIENT)
Dept: PULMONOLOGY | Age: 52
End: 2022-10-20
Payer: MEDICARE

## 2022-10-20 VITALS
TEMPERATURE: 97.9 F | BODY MASS INDEX: 38.34 KG/M2 | OXYGEN SATURATION: 97 % | SYSTOLIC BLOOD PRESSURE: 147 MMHG | WEIGHT: 253 LBS | DIASTOLIC BLOOD PRESSURE: 98 MMHG | RESPIRATION RATE: 16 BRPM | HEART RATE: 96 BPM | HEIGHT: 68 IN

## 2022-10-20 DIAGNOSIS — G47.52 RBD (REM BEHAVIORAL DISORDER): ICD-10-CM

## 2022-10-20 DIAGNOSIS — G47.33 OSA (OBSTRUCTIVE SLEEP APNEA): ICD-10-CM

## 2022-10-20 DIAGNOSIS — G47.411 CATAPLEXY AND NARCOLEPSY: Primary | ICD-10-CM

## 2022-10-20 DIAGNOSIS — Z72.0 CONTINUOUS TOBACCO ABUSE: ICD-10-CM

## 2022-10-20 PROCEDURE — G8427 DOCREV CUR MEDS BY ELIG CLIN: HCPCS | Performed by: INTERNAL MEDICINE

## 2022-10-20 PROCEDURE — 3017F COLORECTAL CA SCREEN DOC REV: CPT | Performed by: INTERNAL MEDICINE

## 2022-10-20 PROCEDURE — G8484 FLU IMMUNIZE NO ADMIN: HCPCS | Performed by: INTERNAL MEDICINE

## 2022-10-20 PROCEDURE — G8417 CALC BMI ABV UP PARAM F/U: HCPCS | Performed by: INTERNAL MEDICINE

## 2022-10-20 PROCEDURE — 4004F PT TOBACCO SCREEN RCVD TLK: CPT | Performed by: INTERNAL MEDICINE

## 2022-10-20 PROCEDURE — 99214 OFFICE O/P EST MOD 30 MIN: CPT | Performed by: INTERNAL MEDICINE

## 2022-10-20 RX ORDER — CYCLOBENZAPRINE HCL 10 MG
10 TABLET ORAL NIGHTLY
COMMUNITY

## 2022-10-20 RX ORDER — ESCITALOPRAM OXALATE 5 MG/1
2.5 TABLET ORAL DAILY
COMMUNITY

## 2022-10-20 RX ORDER — OMEPRAZOLE 40 MG/1
40 CAPSULE, DELAYED RELEASE ORAL
Qty: 90 CAPSULE | Refills: 1 | Status: SHIPPED | OUTPATIENT
Start: 2022-10-20

## 2022-10-20 ASSESSMENT — ENCOUNTER SYMPTOMS
ALLERGIC/IMMUNOLOGIC NEGATIVE: 1
ORTHOPNEA: 0
EYES NEGATIVE: 1
RHINORRHEA: 0
GASTROINTESTINAL NEGATIVE: 1
VOMITING: 0
SWOLLEN GLANDS: 0
ABDOMINAL PAIN: 0
SORE THROAT: 0
SHORTNESS OF BREATH: 1
WHEEZING: 0
SPUTUM PRODUCTION: 0
HEMOPTYSIS: 0

## 2022-10-20 NOTE — PROGRESS NOTES
MA Communication:   The following orders are received by verbal communication from Ezio Peña MD    Orders include:  31-90 day w/Irene scheduled 12/1/22

## 2022-10-20 NOTE — LETTER
[facility]  [address] [facility] [address] [address]  Phone: [phone]  Fax: [phone]    Brandt Chun MD        October 20, 2022     Patient: Derian Berrios   YOB: 1970   Date of Visit: 10/20/2022       10/20/22        Andrei Marino Bob      I have seen this patient in the office today and wanted to communicate my findings and recommendations. Patient Instructions         ASSESSMENT/PLAN:   Diagnosis Orders   1. Cataplexy and narcolepsy        2. DARIEL (obstructive sleep apnea)        3. Continuous tobacco abuse        4.  RBD (REM behavioral disorder)          Narcolepsy with Cataplexy  OF NOTE: Stephanie narcolepsy is very severe and has lead to be on social security and incapable of self support     Still having RBD a, crying and dreaming more at night, now also having walking out of the bedroom at night at times, fell down steps     Having cataplexy, still about the same, getting this about 2-3 times a day     Tried and failed with: nortryptiline and xyrem (tried 2 times)   Tried and failed with Wakix, had side effects, adderall, vyvanse,   Had reaction now a side effect of tongue drying  Adderall 30 mg twice a day    Weight is at 253 from 247 from 250 from 243 from  200 from 191 and was 216 coming down  Will send to gastric surgeon for weight loss         Continue with:  Klonopin 2 mg at night   Melatonin 20 mg at night  Amitriptyline 50 mg at night  Ritalin 20 mg Three times- will try to back off later after the dariel has been treated        Stress is a trigger    Now having cataplexy at about 3 times a day  And the arms are very hard to move        DARIEL        \  [facility]ME [facility]    Autopap is set at min of 5 and max of 20  Using with full facemask  I have access via airview    Need to use at least 4 hours a day    Will need to start using   Setup date was 9/19/22        Smoking  Started back on but trying to quit  But will stop soon        LDCT scan done 3/1/22     No suspicious nodules. No acute thoracic findings. Areas of mild scattered atelectasis/scarring throughout the lungs. LUNG-RADS CATEGORY:   1: No nodules or definitely benign nodules. (Nodule/s with specific calcifications: complete, central, popcorn, concentric rings and fat containing nodules)         MODIFIER: None     NODULE RECOMMENDATION:  Category 1 or 2: Continue annual low dose CT lung cancer screening. Patient should return in one year for a follow-up exam         covid vaccination x 2,       Shortness of breath    continue  Albuterol 2 puffs as needed, can use every 6 hours, using once a day          PFT   DATE OF PROCEDURE:  05/25/2022     Full PFT done. FEV1 of 2.49, 78% predicted, FVC of 3.53, 88% predicted,  FEV1 to FVC ratio was 70% showing no obstructive lung defect. Lung  volumes do not show air trapping, hyperinflation, or restrictive lung  defect. Diffusion is normal when adjusted for alveolar ventilation. IMPRESSION:  Normal spirometry. Lung volumes showed diffusion. Flow  volume loops are slightly flattened. Expiratory loop could have a  variable intrathoracic obstruction. methacholine challenge    DATE OF PROCEDURE:  05/25/2022     METHACHOLINE CHALLENGE     Five doses of incremental increasing doses of methacholine were given. There was no drop in FEV1 less than 20%. IMPRESSION:  Negative methacholine challenge. Echo of the heart b/c of issus of long term amphetamine and dizziness and shortness breath. Conclusions      Summary   Normal left ventricle systolic function with an estimated ejection fraction   of 55%. Definity contrast administered with no evidence of left ventricular mass or   thrombus noted. No regional wall motion abnormalities are seen. Grade I diastolic dysfunction with normal filing pressure. No significant valvular heart disease noted.         GERD   Seen by GI  And had EGD showing ulcers  Will need to be on therapy  Seen by Dr. Zane Johnson  Will refill the   Omeprazole 40 mg once a day       RTC in 6 weeks.                       Thank you for allowing me to assist in the care of the MD Mira Elkins MD

## 2022-10-20 NOTE — PATIENT INSTRUCTIONS
ASSESSMENT/PLAN:   Diagnosis Orders   1. Cataplexy and narcolepsy        2. DARIEL (obstructive sleep apnea)        3. Continuous tobacco abuse        4. RBD (REM behavioral disorder)          Narcolepsy with Cataplexy  OF NOTE: Stephanie narcolepsy is very severe and has lead to be on social security and incapable of self support     Still having RBD a, crying and dreaming more at night, now also having walking out of the bedroom at night at times, fell down steps     Having cataplexy, still about the same, getting this about 2-3 times a day     Tried and failed with: nortryptiline and xyrem (tried 2 times)   Tried and failed with Wakix, had side effects, adderall, vyvanse,   Had reaction now a side effect of tongue drying  Adderall 30 mg twice a day    Weight is at 253 from 247 from 250 from 243 from  200 from 191 and was 216 coming down  Will send to gastric surgeon for weight loss         Continue with:  Klonopin 2 mg at night   Melatonin 20 mg at night  Amitriptyline 50 mg at night  Ritalin 20 mg Three times- will try to back off later after the dariel has been treated        Stress is a trigger    Now having cataplexy at about 3 times a day  And the arms are very hard to move        DARIEL        \  [facility]    Autopap is set at min of 5 and max of 20  Using with full facemask  I have access via airview    Need to use at least 4 hours a day    Will need to start using   Setup date was 9/19/22        Smoking  Started back on but trying to quit  But will stop soon        LDCT scan done 3/1/22     No suspicious nodules. No acute thoracic findings. Areas of mild scattered atelectasis/scarring throughout the lungs. LUNG-RADS CATEGORY:   1: No nodules or definitely benign nodules.  (Nodule/s with specific calcifications: complete, central, popcorn, concentric rings and fat containing nodules)         MODIFIER: None     NODULE RECOMMENDATION:  Category 1 or 2: Continue annual low dose CT lung cancer screening. Patient should return in one year for a follow-up exam         covid vaccination x 2,       Shortness of breath    continue  Albuterol 2 puffs as needed, can use every 6 hours, using once a day          PFT   DATE OF PROCEDURE:  05/25/2022     Full PFT done. FEV1 of 2.49, 78% predicted, FVC of 3.53, 88% predicted,  FEV1 to FVC ratio was 70% showing no obstructive lung defect. Lung  volumes do not show air trapping, hyperinflation, or restrictive lung  defect. Diffusion is normal when adjusted for alveolar ventilation. IMPRESSION:  Normal spirometry. Lung volumes showed diffusion. Flow  volume loops are slightly flattened. Expiratory loop could have a  variable intrathoracic obstruction. methacholine challenge    DATE OF PROCEDURE:  05/25/2022     METHACHOLINE CHALLENGE     Five doses of incremental increasing doses of methacholine were given. There was no drop in FEV1 less than 20%. IMPRESSION:  Negative methacholine challenge. Echo of the heart b/c of issus of long term amphetamine and dizziness and shortness breath. Conclusions      Summary   Normal left ventricle systolic function with an estimated ejection fraction   of 55%. Definity contrast administered with no evidence of left ventricular mass or   thrombus noted. No regional wall motion abnormalities are seen. Grade I diastolic dysfunction with normal filing pressure. No significant valvular heart disease noted. GERD   Seen by GI  And had EGD showing ulcers  Will need to be on therapy  Seen by Dr. Katie Valverde  Will refill the   Omeprazole 40 mg once a day       RTC in 6 weeks.    Fa
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
## 2022-10-28 DIAGNOSIS — G47.00 INSOMNIA, UNSPECIFIED TYPE: ICD-10-CM

## 2022-10-28 RX ORDER — CLONAZEPAM 1 MG/1
TABLET ORAL
Qty: 60 TABLET | Refills: 2 | Status: SHIPPED | OUTPATIENT
Start: 2022-10-28 | End: 2023-01-26

## 2022-11-08 ENCOUNTER — TELEPHONE (OUTPATIENT)
Dept: PULMONOLOGY | Age: 52
End: 2022-11-08

## 2022-11-08 DIAGNOSIS — G47.411 NARCOLEPSY AND CATAPLEXY: Primary | ICD-10-CM

## 2022-11-08 RX ORDER — METHYLPHENIDATE HYDROCHLORIDE 20 MG/1
20 TABLET ORAL 3 TIMES DAILY
Qty: 90 TABLET | Refills: 0 | Status: SHIPPED | OUTPATIENT
Start: 2022-12-08 | End: 2023-01-07

## 2022-11-08 RX ORDER — METHYLPHENIDATE HYDROCHLORIDE 20 MG/1
20 TABLET ORAL 3 TIMES DAILY
Qty: 90 TABLET | Refills: 0 | Status: SHIPPED | OUTPATIENT
Start: 2022-11-08 | End: 2022-12-08

## 2022-11-08 NOTE — TELEPHONE ENCOUNTER
Patient call stated want a refill for Ritalin send to her pharmacy, and also that she hasn't been using her CPAP at all because of her stomach issues. Please advise.

## 2022-11-11 ENCOUNTER — APPOINTMENT (OUTPATIENT)
Dept: CT IMAGING | Age: 52
End: 2022-11-11
Payer: MEDICARE

## 2022-11-11 ENCOUNTER — APPOINTMENT (OUTPATIENT)
Dept: MRI IMAGING | Age: 52
End: 2022-11-11
Payer: MEDICARE

## 2022-11-11 ENCOUNTER — HOSPITAL ENCOUNTER (EMERGENCY)
Age: 52
Discharge: HOME | End: 2022-11-12
Attending: EMERGENCY MEDICINE
Payer: MEDICARE

## 2022-11-11 DIAGNOSIS — R42 DIZZINESS: Primary | ICD-10-CM

## 2022-11-11 LAB
ANION GAP SERPL CALCULATED.3IONS-SCNC: 13 MMOL/L (ref 3–16)
BACTERIA: ABNORMAL /HPF
BASOPHILS ABSOLUTE: 0.1 K/UL (ref 0–0.2)
BASOPHILS RELATIVE PERCENT: 0.9 %
BILIRUBIN URINE: NEGATIVE
BLOOD, URINE: NEGATIVE
BUN BLDV-MCNC: 15 MG/DL (ref 7–20)
CALCIUM SERPL-MCNC: 10 MG/DL (ref 8.3–10.6)
CHLORIDE BLD-SCNC: 102 MMOL/L (ref 99–110)
CLARITY: ABNORMAL
CO2: 26 MMOL/L (ref 21–32)
COLOR: YELLOW
CREAT SERPL-MCNC: 0.6 MG/DL (ref 0.6–1.1)
EOSINOPHILS ABSOLUTE: 0.1 K/UL (ref 0–0.6)
EOSINOPHILS RELATIVE PERCENT: 1.1 %
EPITHELIAL CELLS, UA: ABNORMAL /HPF (ref 0–5)
GFR SERPL CREATININE-BSD FRML MDRD: >60 ML/MIN/{1.73_M2}
GLUCOSE BLD-MCNC: 101 MG/DL (ref 70–99)
GLUCOSE URINE: NEGATIVE MG/DL
HCG QUALITATIVE: NEGATIVE
HCT VFR BLD CALC: 40.1 % (ref 36–48)
HEMOGLOBIN: 14 G/DL (ref 12–16)
HYALINE CASTS: ABNORMAL /LPF (ref 0–2)
KETONES, URINE: NEGATIVE MG/DL
LEUKOCYTE ESTERASE, URINE: ABNORMAL
LYMPHOCYTES ABSOLUTE: 4 K/UL (ref 1–5.1)
LYMPHOCYTES RELATIVE PERCENT: 48.4 %
MCH RBC QN AUTO: 32.1 PG (ref 26–34)
MCHC RBC AUTO-ENTMCNC: 35 G/DL (ref 31–36)
MCV RBC AUTO: 91.8 FL (ref 80–100)
MICROSCOPIC EXAMINATION: YES
MONOCYTES ABSOLUTE: 0.7 K/UL (ref 0–1.3)
MONOCYTES RELATIVE PERCENT: 8.6 %
NEUTROPHILS ABSOLUTE: 3.4 K/UL (ref 1.7–7.7)
NEUTROPHILS RELATIVE PERCENT: 41 %
NITRITE, URINE: NEGATIVE
PDW BLD-RTO: 12.9 % (ref 12.4–15.4)
PH UA: 6 (ref 5–8)
PLATELET # BLD: 320 K/UL (ref 135–450)
PMV BLD AUTO: 7.7 FL (ref 5–10.5)
POTASSIUM REFLEX MAGNESIUM: 3.7 MMOL/L (ref 3.5–5.1)
PROTEIN UA: ABNORMAL MG/DL
RBC # BLD: 4.37 M/UL (ref 4–5.2)
RBC UA: ABNORMAL /HPF (ref 0–4)
SODIUM BLD-SCNC: 141 MMOL/L (ref 136–145)
SPECIFIC GRAVITY UA: >=1.03 (ref 1–1.03)
URINE TYPE: ABNORMAL
UROBILINOGEN, URINE: 0.2 E.U./DL
WBC # BLD: 8.2 K/UL (ref 4–11)
WBC UA: ABNORMAL /HPF (ref 0–5)

## 2022-11-11 PROCEDURE — 81001 URINALYSIS AUTO W/SCOPE: CPT

## 2022-11-11 PROCEDURE — 6360000004 HC RX CONTRAST MEDICATION: Performed by: EMERGENCY MEDICINE

## 2022-11-11 PROCEDURE — 84703 CHORIONIC GONADOTROPIN ASSAY: CPT

## 2022-11-11 PROCEDURE — 70498 CT ANGIOGRAPHY NECK: CPT

## 2022-11-11 PROCEDURE — 70450 CT HEAD/BRAIN W/O DYE: CPT

## 2022-11-11 PROCEDURE — 2580000003 HC RX 258: Performed by: STUDENT IN AN ORGANIZED HEALTH CARE EDUCATION/TRAINING PROGRAM

## 2022-11-11 PROCEDURE — 99285 EMERGENCY DEPT VISIT HI MDM: CPT

## 2022-11-11 PROCEDURE — 6370000000 HC RX 637 (ALT 250 FOR IP): Performed by: STUDENT IN AN ORGANIZED HEALTH CARE EDUCATION/TRAINING PROGRAM

## 2022-11-11 PROCEDURE — 93005 ELECTROCARDIOGRAM TRACING: CPT | Performed by: STUDENT IN AN ORGANIZED HEALTH CARE EDUCATION/TRAINING PROGRAM

## 2022-11-11 PROCEDURE — 80048 BASIC METABOLIC PNL TOTAL CA: CPT

## 2022-11-11 PROCEDURE — 70551 MRI BRAIN STEM W/O DYE: CPT

## 2022-11-11 PROCEDURE — 36415 COLL VENOUS BLD VENIPUNCTURE: CPT

## 2022-11-11 PROCEDURE — 85025 COMPLETE CBC W/AUTO DIFF WBC: CPT

## 2022-11-11 RX ORDER — SODIUM CHLORIDE, SODIUM LACTATE, POTASSIUM CHLORIDE, AND CALCIUM CHLORIDE .6; .31; .03; .02 G/100ML; G/100ML; G/100ML; G/100ML
1000 INJECTION, SOLUTION INTRAVENOUS ONCE
Status: COMPLETED | OUTPATIENT
Start: 2022-11-11 | End: 2022-11-11

## 2022-11-11 RX ORDER — MECLIZINE HYDROCHLORIDE 25 MG/1
25 TABLET ORAL ONCE
Status: COMPLETED | OUTPATIENT
Start: 2022-11-11 | End: 2022-11-11

## 2022-11-11 RX ADMIN — SODIUM CHLORIDE, POTASSIUM CHLORIDE, SODIUM LACTATE AND CALCIUM CHLORIDE 1000 ML: 600; 310; 30; 20 INJECTION, SOLUTION INTRAVENOUS at 21:41

## 2022-11-11 RX ADMIN — IOPAMIDOL 75 ML: 755 INJECTION, SOLUTION INTRAVENOUS at 21:50

## 2022-11-11 RX ADMIN — MECLIZINE HYDROCHLORIDE 25 MG: 25 TABLET ORAL at 21:46

## 2022-11-11 ASSESSMENT — PAIN - FUNCTIONAL ASSESSMENT: PAIN_FUNCTIONAL_ASSESSMENT: NONE - DENIES PAIN

## 2022-11-12 VITALS
HEIGHT: 68 IN | DIASTOLIC BLOOD PRESSURE: 88 MMHG | SYSTOLIC BLOOD PRESSURE: 148 MMHG | OXYGEN SATURATION: 98 % | BODY MASS INDEX: 38.65 KG/M2 | RESPIRATION RATE: 16 BRPM | TEMPERATURE: 97.6 F | HEART RATE: 88 BPM | WEIGHT: 255 LBS

## 2022-11-12 RX ORDER — MECLIZINE HYDROCHLORIDE 25 MG/1
25 TABLET ORAL 3 TIMES DAILY PRN
Qty: 30 TABLET | Refills: 0 | Status: SHIPPED | OUTPATIENT
Start: 2022-11-12 | End: 2022-11-22

## 2022-11-12 NOTE — ED NOTES
Patient off to CT scan. Patient ambulated to and from bathroom, patient unsteady on feet. Patient vision 25/20 vision in both eyes and 25/20 in both right and left eye alone.       Roly Douglas RN  11/11/22 0260

## 2022-11-12 NOTE — DISCHARGE INSTRUCTIONS
You were seen in the emergency department for dizziness. Your testing did not show any signs of stroke or other cause of dizziness. You need to follow-up with your primary care provider as soon as possible. You can try taking a medication called meclizine (Antivert) as needed for dizziness. If you are unable to see your primary care provider and start to experience new or worsening symptoms, including worsening symptoms, numbness, weakness, difficulty talking, facial droop, or other symptoms, please return to the emergency department for re-evaluation.

## 2022-11-12 NOTE — ED NOTES
ekg done and given to MD, patient back from CT, patient on continuous cardiac and SpO2 monitoring.      Aldo Edge RN  11/11/22 9858
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
## 2022-11-12 NOTE — ED PROVIDER NOTES
[address] [facility]          EM RESIDENT NOTE       Date of evaluation: 11/11/2022    Chief Complaint     Dizziness (Since this morning. Pt went to urgent care and was sent here by EMS. Also reports hypertension. )      of Present Illness     Raimundo Weber is a 47 y.o. female history of obesity, prior skin cancer, chronic neck pain, narcolepsy, DARIEL, PUD who presents to the emergency department for dizziness. Patient states that when she woke up this morning she noticed it dizziness that she describes as feeling off balance. She states that she feels like she is drunk and is having trouble walking but is not intoxicated. She has noticed this when she tries walking and has not noted any symptoms at rest.  She has mostly noticed the symptoms when she bends forward, for example today when she leaned down to pet a dog. She took her blood sugar at home which was 174. She also took her blood pressure and it was elevated to 160s/100s. She then went to an urgent care for the symptoms and reports that her SBP was elevated to the 180s and they recommended that she come to the emergency department for evaluation. She does report some associated blurred vision in both eyes but is vague and is unable to identify of this is acute or chronic. She has not noted any double vision or visual field cuts. No hearing loss, tinnitus, ear pain or fullness. No speech disturbances. No numbness, weakness, or paresthesias. She denies any head trauma. She does have chronic neck pain and has been doing neck physical therapy but has not had chiropractic tx or manipulation. No significant headache. No fever, chills, chest pain, shortness of breath, abdominal pain, nausea, vomiting, melena. Review of Systems     Positive for dizziness. Negative for fever, chills, chest pain, shortness of breath, abdominal pain, nausea, vomiting, diarrhea, pedal edema.      All other symptoms as mentioned previously in the [facility]. Past Medical, Surgical, Family, and Social History     She has a past medical history of Anxiety, Atrophic kidney, Cancer ([address] Cellulitis, Cervical disc herniation, Endometriosis, Lumbar disc disorder, Migraines, Narcolepsy, Nephrolithiasis, and Ovarian cyst.  She has a past surgical history that includes Tonsillectomy (2006); lumbar fusion (2008); laparoscopy; shoulder surgery; Foot surgery (12/27/2012); and Upper gastrointestinal endoscopy (N/A, 9/15/2022). Her family history includes Cancer in her mother and sister; Diabetes in her maternal aunt and sister; Hypertension in her father and sister. She reports that she has been smoking cigarettes. She started smoking about 6 months ago. She has a 10.00 pack-year smoking history. She has never used smokeless tobacco. She reports current alcohol use. She reports that she does not use drugs. Medications     Discharge Medication List as of 11/12/2022  1:51 AM        CONTINUE these medications which have NOT CHANGED    Details   !! methylphenidate (RITALIN) 20 MG tablet Take 1 tablet by mouth 3 times daily for 30 days. , Disp-90 tablet, R-0Normal      !! methylphenidate (RITALIN) 20 MG tablet Take 1 tablet by mouth 3 times daily for 30 days. , Disp-90 tablet, R-0Normal      !! clonazePAM (KLONOPIN) 1 MG tablet TAKE 2 TABLETS BY MOUTH AT BEDTIME, Disp-60 tablet, R-2Normal      cyclobenzaprine (FLEXERIL) 10 MG tablet Take 10 mg by mouth at bedtimeHistorical Med      escitalopram (LEXAPRO) 5 MG tablet Take 2.5 mg by mouth dailyHistorical Med      omeprazole (PRILOSEC) 40 MG delayed release capsule Take 1 capsule by mouth every morning (before breakfast), Disp-90 capsule, R-1Normal      amitriptyline (ELAVIL) 50 MG tablet TAKE 1 TABLET BY MOUTH NIGHTLY, Disp-90 tablet, R-1Normal      !! clonazePAM (KLONOPIN) 0.5 MG tablet Take 0.25 mg by mouth nightly as needed. Historical Med      pantoprazole (PROTONIX) 40 MG tablet Take 1 tablet by mouth every morning (before breakfast), Disp-90 tablet, R-1Normal      albuterol sulfate HFA (VENTOLIN HFA) 108 (90 Base) MCG/ACT inhaler Inhale 2 puffs into the lungs 4 times daily as needed for Wheezing, Disp-54 g, R-1Normal      clobetasol (TEMOVATE) 0.05 % cream Apply to affected areas twice daily for up to 2 weeks or until improved. , Disp-45 g, R-0, Normal      selenium sulfide (SELSUN) 2.5 % lotion DISPENSE SHAMPOO. USE TO [facility] THE HAIR 3 TIMES WEEKLY. LEAVE ON SCALP FOR 5 MINUTES PRIOR TO RINSING, Disp-120 mL, R-5, Normal      acetaminophen (TYLENOL) 500 MG tablet TAKE 2 TABLETS BY MOUTH EVERY 6 (SIX) HOURS AS NEEDED (FOR PAIN). Historical Med       !! - Potential duplicate medications found. Please discuss with provider. Allergies     She is allergic to codeine, morphine and related, percocet [oxycodone-acetaminophen], prednisone, and tetracyclines & related. Physical Exam     INITIAL VITALS: BP: (!) 142/118, Temp: 97.6 °F (36.4 °C), Heart Rate: (!) 102, Resp: 16, SpO2: 95 %     Physical exam  General: well-appearing, no acute distress  HEENT: NC/AT, Tms clear BL, no ttp over the mastoids, frontal or maxillary sinuses, no discharge from the eyes or nose. OP clear. Uvula midline. Palate elevates symmetrically. Cardiovascular: regular rate and rhythm, no murmurs. Strong pulses in all 4 extremities. Pulmonary: non-labored breathing, normal lung sounds  Abdominal: soft, non-tender, non-distended  Musculoskeletal: no long bone deformity  Extremities: no peripheral edema  Skin: dry, no rashes or lesions  Neuro: alert and oriented, CN II-XII intact, strength 5/5 in all extremities, SILT in all extremities, no pronator drift. No tremors. Visual fields intact BL. EOMI. No nystagmus. PERRL 3mm BL. No APD. No dysarthria or aphasia. Some dysequilibrium with ambulation, tending to lean to the right and tries to steady herself, but does not have any falls.  When standing with eyes closed, she has some difficulty with balance. DiagnosticResults     EKG   Interpreted in conjunction with emergencydepartment physician No att. providers found    NSR at a rate of 86 bpm.  Normal axis. Qtc 469ms. No ST segment or T wave changes. No significant change when compared with previous EKG dated 8/18/2019. RADIOLOGY:  MRI BRAIN WO CONTRAST   Final Result      1. Negative. CTA HEAD NECK W CONTRAST   Final Result      1. No significant stenosis of the arteries of the neck. PROCEDURE: CT ANGIOGRAPHY HEAD WITH/WITHOUT CONTRAST      INDICATION: dizziness, dysequilibrium, recent neck manipulation, pls eval for vert dissection vs posterior stroke      COMPARISON: none      TECHNIQUE: Axial CT imaging obtained through the head prior to and following administration of IV contrast. Axial images, multiplanar reformatted images, and maximum intensity projection images were reviewed for CT angiographic technique. IV contrast: 75 mL Omnipaque 350. FINDINGS:      ANTERIOR CIRCULATION: The intracranial internal carotid arteries, anterior cerebral arteries, and middle cerebral arteries demonstrate no occlusion or stenosis. No evidence for aneurysm or arteriovenous malformation. POSTERIOR CIRCULATION: The bilateral vertebral arteries, basilar artery and posterior cerebral arteries demonstrate no occlusion or stenosis. No evidence for aneurysm or arteriovenous malformation. INTRACRANIAL VENOUS SYSTEM: No evidence for intracranial venous thrombosis. OTHER: None. IMPRESSION:      1. No significant stenosis of the proximal intracranial vessels. CT HEAD WO CONTRAST   Final Result      1. No acute intracranial hemorrhage or mass effect.           LABS:   Results for orders placed or performed during the hospital encounter of 33/86/31   Basic Metabolic Panel w/ Reflex to MG   Result Value Ref Range    Sodium 141 136 - 145 mmol/L    Potassium reflex Magnesium 3.7 3.5 - 5.1 mmol/L    Chloride 102 99 - 110 mmol/L    CO2 26 21 - 32 mmol/L    Anion Gap 13 3 - 16    Glucose 101 (H) 70 - 99 mg/dL    BUN 15 7 - 20 mg/dL    Creatinine 0.6 0.6 - 1.1 mg/dL    Est, Glom Filt Rate >60 >60    Calcium 10.0 8.3 - 10.6 mg/dL   CBC with Auto Differential   Result Value Ref Range    WBC 8.2 4.0 - 11.0 K/uL    RBC 4.37 4.00 - 5.20 M/uL    Hemoglobin 14.0 12.0 - 16.0 g/dL    Hematocrit 40.1 36.0 - 48.0 %    MCV 91.8 80.0 - 100.0 fL    MCH 32.1 26.0 - 34.0 pg    MCHC 35.0 31.0 - 36.0 g/dL    RDW 12.9 12.4 - 15.4 %    Platelets 276 420 - 972 K/uL    MPV 7.7 5.0 - 10.5 fL    Neutrophils % 41.0 %    Lymphocytes % 48.4 %    Monocytes % 8.6 %    Eosinophils % 1.1 %    Basophils % 0.9 %    Neutrophils Absolute 3.4 1.7 - 7.7 K/uL    Lymphocytes Absolute 4.0 1.0 - 5.1 K/uL    Monocytes Absolute 0.7 0.0 - 1.3 K/uL    Eosinophils Absolute 0.1 0.0 - 0.6 K/uL    Basophils Absolute 0.1 0.0 - 0.2 K/uL   HCG Qualitative, Serum   Result Value Ref Range    hCG Qual Negative Detects HCG level >10 MIU/mL   Urinalysis   Result Value Ref Range    Color, UA Yellow Straw/Yellow    Clarity, UA SL CLOUDY (A) Clear    Glucose, Ur Negative Negative mg/dL    Bilirubin Urine Negative Negative    Ketones, Urine Negative Negative mg/dL    Specific Gravity, UA >=1.030 1.005 - 1.030    Blood, Urine Negative Negative    pH, UA 6.0 5.0 - 8.0    Protein, UA TRACE (A) Negative mg/dL    Urobilinogen, Urine 0.2 <2.0 E.U./dL    Nitrite, Urine Negative Negative    Leukocyte Esterase, Urine TRACE (A) Negative    Microscopic Examination YES     Urine Type Other    Microscopic Urinalysis   Result Value Ref Range    Hyaline Casts, UA 3-5 (A) 0 - 2 /LPF    WBC, UA 3-5 0 - 5 /HPF    RBC, UA 0-2 0 - 4 /HPF    Epithelial Cells, UA 6-10 (A) 0 - 5 /HPF    Bacteria, UA 3+ (A) None Seen /HPF   EKG 12 Lead   Result Value Ref Range    Ventricular Rate 86 BPM    Atrial Rate 86 BPM    P-R Interval 154 ms    QRS Duration 78 ms    Q-T Interval 392 ms    QTc Calculation (Bazett) 469 ms P Axis 69 degrees    R Axis 66 degrees    T Axis 59 degrees    Diagnosis Normal sinus rhythmLow voltage QRSBorderline ECG        RECENT VITALS:  BP: (!) 148/88, Temp: 97.6 °F (36.4 °C), Heart Rate: 88,Resp: 16, SpO2: 98 %     Procedures         ED Course     Nursing Notes, Past Medical Hx, Past Surgical Hx, Social Hx, Allergies, and Family Hx were reviewed. The patient was given the followingmedications:  Orders Placed This Encounter   Medications    meclizine (ANTIVERT) tablet 25 mg    lactated ringers bolus    iopamidol (ISOVUE-370) 76 % injection 75 mL    meclizine (ANTIVERT) 25 MG tablet     Sig: Take 1 tablet by mouth 3 times daily as needed for Dizziness     Dispense:  30 tablet     Refill:  0         CONSULTS:  None    MEDICAL DECISION MAKING / ASSESSMENT / Ghassan Blanco is a 47 y.o. female who presents to the emergency department with dizziness. Arrival, vital signs are notable for very mild hypertension but not to the degree that patient described at home or at urgent care. On examination, she does have some disequilibrium with ambulation and tends to fall towards the right, which is worsened with turning around or changing head position. She has no other neuro deficits. Tms are clear. EKG shows NSR without ischemia. CBC, BMP, and hCG are unremarkable. UA shows no evidence of urinary tract infection. CT head shows no evidence of intracranial hemorrhage. CTA head and neck do not show any evidence of vertebral dissection or large vessel occlusion. MRI brain was then performed which shows no evidence of CVA or mass. Patient is able to ambulate in the emergency department without assistance. She reports no significant improvement in dizziness after meclizine. Vital signs remained stable. She has follow-up with her primary care provider. Strict return precautions were reviewed. She was given a prescription for meclizine as needed.     Patient was given discharge instructions

## 2022-11-14 LAB
EKG ATRIAL RATE: 86 BPM
EKG DIAGNOSIS: NORMAL
EKG P AXIS: 69 DEGREES
EKG P-R INTERVAL: 154 MS
EKG Q-T INTERVAL: 392 MS
EKG QRS DURATION: 78 MS
EKG QTC CALCULATION (BAZETT): 469 MS
EKG R AXIS: 66 DEGREES
EKG T AXIS: 59 DEGREES
EKG VENTRICULAR RATE: 86 BPM

## 2022-11-29 ENCOUNTER — TELEPHONE (OUTPATIENT)
Dept: PULMONOLOGY | Age: 52
End: 2022-11-29

## 2022-11-29 NOTE — TELEPHONE ENCOUNTER
Patient is requesting to see Dr. Mary Thurston, states she is having lung, stomach and heart issues again. He sent her to a stomach doctor before and she would really want to talk to Dr. Mary Thurston about these issues. Please advise.

## 2022-12-02 DIAGNOSIS — R79.89 ABNORMAL LFTS: Primary | ICD-10-CM

## 2022-12-02 NOTE — TELEPHONE ENCOUNTER
If I have an opening that is fine otherwise I would suggest that she go see her primary care physician

## 2022-12-08 ENCOUNTER — HOSPITAL ENCOUNTER (INPATIENT)
Age: 52
LOS: 1 days | Discharge: HOME | DRG: 392 | End: 2022-12-09
Attending: INTERNAL MEDICINE | Admitting: INTERNAL MEDICINE
Payer: MEDICARE

## 2022-12-08 ENCOUNTER — APPOINTMENT (OUTPATIENT)
Dept: CT IMAGING | Age: 52
DRG: 392 | End: 2022-12-08
Attending: INTERNAL MEDICINE
Payer: MEDICARE

## 2022-12-08 ENCOUNTER — OFFICE VISIT (OUTPATIENT)
Dept: PULMONOLOGY | Age: 52
End: 2022-12-08
Payer: MEDICARE

## 2022-12-08 VITALS
WEIGHT: 255 LBS | HEIGHT: 68 IN | DIASTOLIC BLOOD PRESSURE: 86 MMHG | HEART RATE: 96 BPM | RESPIRATION RATE: 16 BRPM | TEMPERATURE: 97.1 F | BODY MASS INDEX: 38.65 KG/M2 | OXYGEN SATURATION: 97 % | SYSTOLIC BLOOD PRESSURE: 133 MMHG

## 2022-12-08 DIAGNOSIS — K21.9 GASTROESOPHAGEAL REFLUX DISEASE WITHOUT ESOPHAGITIS: ICD-10-CM

## 2022-12-08 DIAGNOSIS — E66.9 CLASS 2 OBESITY WITH BODY MASS INDEX (BMI) OF 38.0 TO 38.9 IN ADULT, UNSPECIFIED OBESITY TYPE, UNSPECIFIED WHETHER SERIOUS COMORBIDITY PRESENT: ICD-10-CM

## 2022-12-08 DIAGNOSIS — R10.13 EPIGASTRIC PAIN: ICD-10-CM

## 2022-12-08 DIAGNOSIS — G47.33 OSA (OBSTRUCTIVE SLEEP APNEA): ICD-10-CM

## 2022-12-08 DIAGNOSIS — G47.411 NARCOLEPSY AND CATAPLEXY: ICD-10-CM

## 2022-12-08 DIAGNOSIS — R06.02 SHORTNESS OF BREATH: Primary | ICD-10-CM

## 2022-12-08 DIAGNOSIS — Z72.0 CONTINUOUS TOBACCO ABUSE: ICD-10-CM

## 2022-12-08 DIAGNOSIS — G47.00 INSOMNIA, UNSPECIFIED TYPE: ICD-10-CM

## 2022-12-08 PROBLEM — R10.9 ABDOMINAL PAIN: Status: ACTIVE | Noted: 2022-12-08

## 2022-12-08 LAB
A/G RATIO: 1.4 (ref 1.1–2.2)
ALBUMIN SERPL-MCNC: 4.4 G/DL (ref 3.4–5)
ALP BLD-CCNC: 118 U/L (ref 40–129)
ALT SERPL-CCNC: 28 U/L (ref 10–40)
ANION GAP SERPL CALCULATED.3IONS-SCNC: 10 MMOL/L (ref 3–16)
AST SERPL-CCNC: 22 U/L (ref 15–37)
BILIRUB SERPL-MCNC: 0.4 MG/DL (ref 0–1)
BILIRUBIN URINE: NEGATIVE
BLOOD, URINE: NEGATIVE
BUN BLDV-MCNC: 12 MG/DL (ref 7–20)
CALCIUM SERPL-MCNC: 9.6 MG/DL (ref 8.3–10.6)
CHLORIDE BLD-SCNC: 102 MMOL/L (ref 99–110)
CLARITY: CLEAR
CO2: 28 MMOL/L (ref 21–32)
COLOR: YELLOW
CREAT SERPL-MCNC: 0.7 MG/DL (ref 0.6–1.1)
GFR SERPL CREATININE-BSD FRML MDRD: >60 ML/MIN/{1.73_M2}
GLUCOSE BLD-MCNC: 103 MG/DL (ref 70–99)
GLUCOSE URINE: NEGATIVE MG/DL
HCT VFR BLD CALC: 40.6 % (ref 36–48)
HEMOGLOBIN: 13.8 G/DL (ref 12–16)
INR BLD: 0.94 (ref 0.87–1.14)
KETONES, URINE: ABNORMAL MG/DL
LEUKOCYTE ESTERASE, URINE: NEGATIVE
LIPASE: 35 U/L (ref 13–60)
MCH RBC QN AUTO: 31 PG (ref 26–34)
MCHC RBC AUTO-ENTMCNC: 33.9 G/DL (ref 31–36)
MCV RBC AUTO: 91.6 FL (ref 80–100)
MICROSCOPIC EXAMINATION: ABNORMAL
NITRITE, URINE: NEGATIVE
PDW BLD-RTO: 12.6 % (ref 12.4–15.4)
PH UA: 6 (ref 5–8)
PLATELET # BLD: 335 K/UL (ref 135–450)
PMV BLD AUTO: 7.4 FL (ref 5–10.5)
POTASSIUM REFLEX MAGNESIUM: 4.3 MMOL/L (ref 3.5–5.1)
PROTEIN UA: NEGATIVE MG/DL
PROTHROMBIN TIME: 12.5 SEC (ref 11.7–14.5)
RBC # BLD: 4.44 M/UL (ref 4–5.2)
SODIUM BLD-SCNC: 140 MMOL/L (ref 136–145)
SPECIFIC GRAVITY UA: 1.02 (ref 1–1.03)
TOTAL PROTEIN: 7.6 G/DL (ref 6.4–8.2)
URINE REFLEX TO CULTURE: ABNORMAL
URINE TYPE: ABNORMAL
UROBILINOGEN, URINE: 0.2 E.U./DL
WBC # BLD: 6.5 K/UL (ref 4–11)

## 2022-12-08 PROCEDURE — G8417 CALC BMI ABV UP PARAM F/U: HCPCS | Performed by: INTERNAL MEDICINE

## 2022-12-08 PROCEDURE — 80053 COMPREHEN METABOLIC PANEL: CPT

## 2022-12-08 PROCEDURE — 1200000000 HC SEMI PRIVATE

## 2022-12-08 PROCEDURE — G8484 FLU IMMUNIZE NO ADMIN: HCPCS | Performed by: INTERNAL MEDICINE

## 2022-12-08 PROCEDURE — 99215 OFFICE O/P EST HI 40 MIN: CPT | Performed by: INTERNAL MEDICINE

## 2022-12-08 PROCEDURE — 3017F COLORECTAL CA SCREEN DOC REV: CPT | Performed by: INTERNAL MEDICINE

## 2022-12-08 PROCEDURE — 85027 COMPLETE CBC AUTOMATED: CPT

## 2022-12-08 PROCEDURE — 81003 URINALYSIS AUTO W/O SCOPE: CPT

## 2022-12-08 PROCEDURE — 2580000003 HC RX 258: Performed by: INTERNAL MEDICINE

## 2022-12-08 PROCEDURE — 6360000004 HC RX CONTRAST MEDICATION: Performed by: REGISTERED NURSE

## 2022-12-08 PROCEDURE — 85610 PROTHROMBIN TIME: CPT

## 2022-12-08 PROCEDURE — 36415 COLL VENOUS BLD VENIPUNCTURE: CPT

## 2022-12-08 PROCEDURE — G8427 DOCREV CUR MEDS BY ELIG CLIN: HCPCS | Performed by: INTERNAL MEDICINE

## 2022-12-08 PROCEDURE — 1036F TOBACCO NON-USER: CPT | Performed by: INTERNAL MEDICINE

## 2022-12-08 PROCEDURE — 83690 ASSAY OF LIPASE: CPT

## 2022-12-08 PROCEDURE — 74177 CT ABD & PELVIS W/CONTRAST: CPT

## 2022-12-08 RX ORDER — ACETAMINOPHEN 325 MG/1
650 TABLET ORAL EVERY 6 HOURS PRN
Status: DISCONTINUED | OUTPATIENT
Start: 2022-12-08 | End: 2022-12-09 | Stop reason: HOSPADM

## 2022-12-08 RX ORDER — PANTOPRAZOLE SODIUM 40 MG/10ML
40 INJECTION, POWDER, LYOPHILIZED, FOR SOLUTION INTRAVENOUS 2 TIMES DAILY
Status: DISCONTINUED | OUTPATIENT
Start: 2022-12-08 | End: 2022-12-09 | Stop reason: HOSPADM

## 2022-12-08 RX ORDER — SODIUM CHLORIDE 9 MG/ML
INJECTION, SOLUTION INTRAVENOUS PRN
Status: DISCONTINUED | OUTPATIENT
Start: 2022-12-08 | End: 2022-12-09 | Stop reason: HOSPADM

## 2022-12-08 RX ORDER — PROCHLORPERAZINE EDISYLATE 5 MG/ML
10 INJECTION INTRAMUSCULAR; INTRAVENOUS EVERY 6 HOURS PRN
Status: DISCONTINUED | OUTPATIENT
Start: 2022-12-08 | End: 2022-12-09 | Stop reason: HOSPADM

## 2022-12-08 RX ORDER — ACETAMINOPHEN 650 MG/1
650 SUPPOSITORY RECTAL EVERY 6 HOURS PRN
Status: DISCONTINUED | OUTPATIENT
Start: 2022-12-08 | End: 2022-12-09 | Stop reason: HOSPADM

## 2022-12-08 RX ORDER — SODIUM CHLORIDE, SODIUM LACTATE, POTASSIUM CHLORIDE, CALCIUM CHLORIDE 600; 310; 30; 20 MG/100ML; MG/100ML; MG/100ML; MG/100ML
INJECTION, SOLUTION INTRAVENOUS CONTINUOUS
Status: DISCONTINUED | OUTPATIENT
Start: 2022-12-08 | End: 2022-12-09 | Stop reason: HOSPADM

## 2022-12-08 RX ORDER — SODIUM CHLORIDE 0.9 % (FLUSH) 0.9 %
5-40 SYRINGE (ML) INJECTION PRN
Status: DISCONTINUED | OUTPATIENT
Start: 2022-12-08 | End: 2022-12-09 | Stop reason: HOSPADM

## 2022-12-08 RX ORDER — POLYETHYLENE GLYCOL 3350 17 G/17G
17 POWDER, FOR SOLUTION ORAL DAILY PRN
Status: DISCONTINUED | OUTPATIENT
Start: 2022-12-08 | End: 2022-12-09 | Stop reason: HOSPADM

## 2022-12-08 RX ORDER — ENOXAPARIN SODIUM 100 MG/ML
30 INJECTION SUBCUTANEOUS 2 TIMES DAILY
Status: DISCONTINUED | OUTPATIENT
Start: 2022-12-08 | End: 2022-12-09 | Stop reason: HOSPADM

## 2022-12-08 RX ORDER — ENOXAPARIN SODIUM 100 MG/ML
40 INJECTION SUBCUTANEOUS DAILY
Status: DISCONTINUED | OUTPATIENT
Start: 2022-12-08 | End: 2022-12-08 | Stop reason: DRUGHIGH

## 2022-12-08 RX ORDER — SODIUM CHLORIDE 0.9 % (FLUSH) 0.9 %
5-40 SYRINGE (ML) INJECTION EVERY 12 HOURS SCHEDULED
Status: DISCONTINUED | OUTPATIENT
Start: 2022-12-08 | End: 2022-12-09 | Stop reason: HOSPADM

## 2022-12-08 RX ADMIN — IOPAMIDOL 75 ML: 755 INJECTION, SOLUTION INTRAVENOUS at 16:06

## 2022-12-08 RX ADMIN — SODIUM CHLORIDE, POTASSIUM CHLORIDE, SODIUM LACTATE AND CALCIUM CHLORIDE: 600; 310; 30; 20 INJECTION, SOLUTION INTRAVENOUS at 17:22

## 2022-12-08 ASSESSMENT — ENCOUNTER SYMPTOMS
ORTHOPNEA: 0
SORE THROAT: 0
RHINORRHEA: 0
ABDOMINAL PAIN: 0
EYES NEGATIVE: 1
WHEEZING: 0
ALLERGIC/IMMUNOLOGIC NEGATIVE: 1
VOMITING: 0
HEMOPTYSIS: 0
SHORTNESS OF BREATH: 1
GASTROINTESTINAL NEGATIVE: 1
SWOLLEN GLANDS: 0
SPUTUM PRODUCTION: 0

## 2022-12-08 ASSESSMENT — PAIN DESCRIPTION - LOCATION: LOCATION: ABDOMEN

## 2022-12-08 ASSESSMENT — PAIN SCALES - GENERAL: PAINLEVEL_OUTOF10: 5

## 2022-12-08 ASSESSMENT — PAIN DESCRIPTION - DESCRIPTORS: DESCRIPTORS: BURNING

## 2022-12-08 NOTE — H&P
Hospital Medicine History & Physical      PCP: Sree Elizabeth MD    Date of Admission: 12/8/2022    Date of Service: Pt seen/examined on 12/08/22 and Admitted to Inpatient with expected LOS greater than two midnights due to medical therapy of suspected gastric perforation    Chief Complaint: Upper abdominal pain      History Of Present Illness:      47 y.o. female who presented to pulmonology office with shortness of breath and upper abdominal pain. Patient has long history of GERD. Was evaluated by gastroenterology and EGD. Showed esophageal and gastric ulcers and started on PPI which the patient did not take for past few days. When the patient presented to pulmonology office for follow-up of narcolepsy, also described an episode of syncope in the past week. Noted patient's upper abdominal pain that got worse after EGD. Apparently, biopsy was taken as well. Pulmonology evaluated the patient and suspected gastric perforation could be the issue and patient was directly admitted. No other accompanying symptoms  Is an onset makes the patient feel better or worse    Past Medical History:          Diagnosis Date    Anxiety     Atrophic kidney     left    Cancer (HCC)     skin (basal cell)    Cellulitis     recurrent    Cervical disc herniation     Endometriosis     Lumbar disc disorder 02/21/2012    Migraines     Narcolepsy     Nephrolithiasis 02/21/2012    Ovarian cyst 02/21/2012       Past Surgical History:          Procedure Laterality Date    FOOT SURGERY  12/27/2012    BROSTROM ANKLE LIGAMENT REPAIR LEFT, PARTIAL EXCISION OF BONE    LAPAROSCOPY      for endometriosis    LUMBAR FUSION  2008    SHOULDER SURGERY      left    TONSILLECTOMY  2006    UPPER GASTROINTESTINAL ENDOSCOPY N/A 9/15/2022    EGD BIOPSY performed by Gilbert Mayen MD at [address] [facility]       Medications Prior to Admission:      Prior to Admission medications    Medication Sig Start Date End Date Taking?  Authorizing Provider Probiotic Product (PROBIOTIC DAILY PO) Take by mouth    Historical Provider, MD   methylphenidate (RITALIN) 20 MG tablet Take 1 tablet by mouth 3 times daily for 30 days. 12/8/22 1/7/23  Crow Inman MD   methylphenidate (RITALIN) 20 MG tablet Take 1 tablet by mouth 3 times daily for 30 days. Patient not taking: Reported on 12/8/2022 11/8/22 12/8/22  Crow Inman MD   clonazePAM (KLONOPIN) 1 MG tablet TAKE 2 TABLETS BY MOUTH AT BEDTIME 10/28/22 1/26/23  Crow Inman MD   cyclobenzaprine (FLEXERIL) 10 MG tablet Take 10 mg by mouth at bedtime    Historical Provider, MD   escitalopram (LEXAPRO) 5 MG tablet Take 2.5 mg by mouth daily    Historical Provider, MD   omeprazole (PRILOSEC) 40 MG delayed release capsule Take 1 capsule by mouth every morning (before breakfast)  Patient not taking: Reported on 12/8/2022 10/20/22   Crow Inman MD   amitriptyline (ELAVIL) 50 MG tablet TAKE 1 TABLET BY MOUTH NIGHTLY 9/30/22   Crow Inman MD   clonazePAM (KLONOPIN) 0.5 MG tablet Take 0.25 mg by mouth nightly as needed. Patient not taking: Reported on 12/8/2022    Historical Provider, MD   pantoprazole (PROTONIX) 40 MG tablet Take 1 tablet by mouth every morning (before breakfast)  Patient not taking: Reported on 12/8/2022 9/13/22   Crow Inman MD   albuterol sulfate HFA (VENTOLIN HFA) 108 (90 Base) MCG/ACT inhaler Inhale 2 puffs into the lungs 4 times daily as needed for Wheezing 7/7/22   Crow Inman MD   clobetasol (TEMOVATE) 0.05 % cream Apply to affected areas twice daily for up to 2 weeks or until improved. 5/4/22   Jesus Manuel Hicks MD   selenium sulfide (SELSUN) 2.5 % lotion DISPENSE SHAMPOO. USE TO [address] THE HAIR 3 TIMES WEEKLY. LEAVE ON SCALP FOR 5 MINUTES PRIOR TO RINSING 11/19/21   Jesus Manuel Hicks MD   acetaminophen (TYLENOL) 500 MG tablet TAKE 2 TABLETS BY MOUTH EVERY 6 (SIX) HOURS AS NEEDED (FOR PAIN).  10/2/20   Historical Provider, MD   ibuprofen (IBU) 800 MG tablet Take 1 tablet by mouth every 8 hours as needed for Pain. Take 1 tab every 8 hours with food as needed for pain and swelling   12/27/12 5/4/22  Emilia Camacho DPM       Allergies:  Codeine, Morphine and related, Percocet [oxycodone-acetaminophen], Prednisone, and Tetracyclines & related    Social History:      The patient currently lives at home    TOBACCO:   reports that she quit smoking about 5 weeks ago. Her smoking use included cigarettes. She started smoking about 7 months ago. She has a 10.00 pack-year smoking history. She has been exposed to tobacco smoke. She has never used smokeless tobacco.  ETOH:   reports current alcohol use. E-cigarette/Vaping       Questions Responses    E-cigarette/Vaping Use Former User    Start Date     Passive Exposure     Quit Date     Counseling Given     Comments Elec Cig and Vap occasionally              Family History:       Reviewed and negative in regards to presenting illness/complaint. Problem Relation Age of Onset    Cancer Mother         skin    Cancer Sister         skin    Diabetes Sister     Diabetes Maternal Aunt     Hypertension Father     Hypertension Sister        REVIEW OF SYSTEMS COMPLETED:   Pertinent positives as noted in the HPI. Weakness, abdominal pain. All other systems reviewed and negative. PHYSICAL EXAM PERFORMED:    BP (!) 147/91   Pulse 83   Temp 98.2 °F (36.8 °C) (Oral)   Resp 16   LMP 11/22/2012   SpO2 97%     General appearance:  No apparent distress, appears stated age and cooperative. HEENT:  Normal cephalic, atraumatic without obvious deformity. Pupils equal, round, and reactive to light. Extra ocular muscles intact. Conjunctivae/corneas clear. Neck: Supple, with full range of motion. No jugular venous distention. Trachea midline. Respiratory:  Normal respiratory effort. Clear to auscultation, bilaterally without Rales/Wheezes/Rhonchi. Cardiovascular:  Regular rate and rhythm with normal S1/S2 without murmurs, rubs or gallops.   Abdomen: Mild distention and generalized tenderness with no rebound tenderness. Bowel sounds hyperactive. Musculoskeletal:  No clubbing, cyanosis or edema bilaterally. Full range of motion without deformity. Skin: Skin color, texture, turgor normal.  No rashes or lesions. Neurologic:  Neurovascularly intact without any focal sensory/motor deficits. Cranial nerves: II-XII intact, grossly non-focal.  Psychiatric:  Alert and oriented, thought content appropriate, normal insight  Capillary Refill: Brisk,3 seconds, normal  Peripheral Pulses: +2 palpable, equal bilaterally       Labs:     No results for input(s): WBC, HGB, HCT, PLT in the last 72 hours. No results for input(s): NA, K, CL, CO2, BUN, CREATININE, CALCIUM, PHOS in the last 72 hours. Invalid input(s): MAGNES  No results for input(s): AST, ALT, BILIDIR, BILITOT, ALKPHOS in the last 72 hours. No results for input(s): INR in the last 72 hours. No results for input(s): Mani Gubler in the last 72 hours. Urinalysis:      Lab Results   Component Value Date/Time    NITRU Negative 11/11/2022 09:44 PM    WBCUA 3-5 11/11/2022 09:44 PM    BACTERIA 3+ 11/11/2022 09:44 PM    RBCUA 0-2 11/11/2022 09:44 PM    BLOODU Negative 11/11/2022 09:44 PM    SPECGRAV >=1.030 11/11/2022 09:44 PM    GLUCOSEU Negative 11/11/2022 09:44 PM    GLUCOSEU Negative 12/10/2011 04:00 PM       Radiology:     CXR: I have reviewed the CXR with the following interpretation: Not done today  EKG:  I have reviewed the EKG with the following interpretation: Not done today    CT ABDOMEN PELVIS W IV CONTRAST Additional Contrast? None    (Results Pending)       Consults:    IP CONSULT TO GI    ASSESSMENT:    Active Hospital Problems    Diagnosis Date Noted    Abdominal pain [R10.9] 12/08/2022     Priority: Medium         PLAN:    Abdominal pain  EGD with biopsies. Suspected perforation. Gastroenterology consulted. Continue to monitor at this time. Keep NPO.   Start IV fluids to prevent dehydration    Gastric and esophageal ulcers  Patient was prescribed oral PPI but did not take it. Starting on IV Protonix pending GI evaluation. Patient to be kept n.p.o.     Migraines  Currently pain-free. As needed analgesia to be provided. Anxiety  Stable patient. We will resume antidepressants when patient is able to take p.o. Discussed with patient. Questions answered    Discussed with Dr. Eden King, pulmonology    DVT Prophylaxis: Lovenox  Diet: Diet NPO  Code Status: Full Code    PT/OT Eval Status: Not indicated    Dispo -inpatient stay pending GI evaluation       Madalyn Jauregui MD    Thank you Andrew Damon MD for the opportunity to be involved in this patient's care. If you have any questions or concerns please feel free to contact me at [phone].

## 2022-12-08 NOTE — PROGRESS NOTES
[facility] For purposes and benefit of continuity of care, I have notified their service of this consult.
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
## 2022-12-08 NOTE — CONSULTS
Consult placed    Who: Dr. Anuj Maldonado, [facility]  Date:12/8/2022,  Time:10:58 AM    Electronically signed by Faizan Kemp on 12/8/2022 at 10:58 AM

## 2022-12-08 NOTE — PROGRESS NOTES
Pharmacist Review and Automatic Dose Adjustment of Prophylactic Enoxaparin         The reviewing pharmacist has made an adjustment to the ordered enoxaparin dose or converted to UFH per the approved [facility] protocol and table as identified below. Sharon Sandhu is a 47 y.o. female. No results for input(s): CREATININE in the last 72 hours. Estimated Creatinine Clearance: 148 mL/min (based on SCr of 0.6 mg/dL). No results for input(s): HGB, HCT, PLT in the last 72 hours. No results for input(s): INR in the last 72 hours.     Height:   Ht Readings from Last 1 Encounters:   12/08/22 5' 8\" (1.727 m)     Weight:  Wt Readings from Last 1 Encounters:   12/08/22 255 lb (115.7 kg)               Plan: Based upon the patient's weight and renal function    Ordered: Enoxaparin 40mg Daily    Changed/converted to    New Order: Enoxaparin 30mg SUBQ BID      Thank you,  Akbar Rhodes [id number], [facility]  12/8/2022, 11:16 AM

## 2022-12-08 NOTE — CONSULTS
Consultation Note    Patient Name: Pastor Neal  : 1970  Age: 47 y.o. Admitting Physician: Marcy Marrero MD   Date of Admission: 2022 10:27 AM   Primary Care Physician: Italia Lyon MD        Pastor Neal is being seen at the request of Marcy Marrero MD for suspected gastric perforation. History of Present Illness:  47year old F with PMH significant for anxiety, atrophic kidney, skin cancer, endometriosis, migraines, narcolepsy, and nephrolithiasis. Abdominal surgeries include laparoscopy. Patient is admitted from pulmonologist's office with complaints of epigastric pain, bloating and syncopal episode. She reports she has been feeling unwell since she had COVID in July of this year. She was seen by Dr. Anuj Maldonado in September for complaints of melena. She had an EGD at that time that showed an antral ulcer. She reports she only took PPI for 2 weeks but felt it made her feel worse and she stopped taking it. Denies use of NSAIDs. She reports that when she had COVID she took a dose of NyQuil which seems to have triggered her symptoms. Since that time she has had complaints of upper abdominal burning and reflux. Complains of early satiety. She complains of abdominal bloating and fullness. She was seen by Dr. Anuj Maldonado for elevated LFTs as well. RUQ US in September showed fatty liver infiltration, but otherwise unremarkable. Over the last week she has had worsening symptoms and presyncopal episode. She was seen in urgent care and found to have hypertensive urgency and was started on medications. BP has improved but still overall feeling unwell. No major abdominal surgeries noted. GI History:  9/15/2022 EGD with Dr. Anuj Maldonado  Findings:  Normal upper and mid esophagus. Biopsied  Irregular Z-line at 38 cm from incisors  A 6 mm clean-based ulcer [facility])  in the antrum. Biopsied  Mild gastritis. Normal duodenal bulb and second portion of duodenum.      2022 Colonoscopy with Dr. Arun Kebede  Findings:  - The colon was well visualized and shows no evidence of protruding or flat colonic polyps. Past Medical History:  Past Medical History:   Diagnosis Date    Anxiety     Atrophic kidney     left    Cancer ([address]     skin (basal cell)    Cellulitis     recurrent    Cervical disc herniation     Endometriosis     Lumbar disc disorder 02/21/2012    Migraines     Narcolepsy     Nephrolithiasis 02/21/2012    Ovarian cyst 02/21/2012        Past Surgical History:  Past Surgical History:   Procedure Laterality Date    FOOT SURGERY  12/27/2012    BROSTROM ANKLE LIGAMENT REPAIR LEFT, PARTIAL EXCISION OF BONE    LAPAROSCOPY      for endometriosis    LUMBAR FUSION  2008    SHOULDER SURGERY      left    TONSILLECTOMY  2006    UPPER GASTROINTESTINAL ENDOSCOPY N/A 9/15/2022    EGD BIOPSY performed by Jhoana Yi MD at [address] [place] [address] Medications:  Prior to Visit Medications    Medication Sig Taking? Authorizing Provider   Probiotic Product (PROBIOTIC DAILY PO) Take by mouth  Historical Provider, MD   methylphenidate (RITALIN) 20 MG tablet Take 1 tablet by mouth 3 times daily for 30 days. Miguelito Jimenez MD   methylphenidate (RITALIN) 20 MG tablet Take 1 tablet by mouth 3 times daily for 30 days. Patient not taking: Reported on 12/8/2022  Miguelito Jimenez MD   clonazePAM (KLONOPIN) 1 MG tablet TAKE 2 TABLETS BY MOUTH AT BEDTIME  Miguelito Jimenez MD   cyclobenzaprine (FLEXERIL) 10 MG tablet Take 10 mg by mouth at bedtime  Historical Provider, MD   escitalopram (LEXAPRO) 5 MG tablet Take 2.5 mg by mouth daily  Historical Provider, MD   omeprazole (PRILOSEC) 40 MG delayed release capsule Take 1 capsule by mouth every morning (before breakfast)  Patient not taking: Reported on 12/8/2022  Miguelito Jimenez MD   amitriptyline (ELAVIL) 50 MG tablet TAKE 1 TABLET BY MOUTH NIGHTLY  Miguelito Jimenez MD   clonazePAM (KLONOPIN) 0.5 MG tablet Take 0.25 mg by mouth nightly as needed.   Patient not taking: Reported on 12/8/2022  Historical Provider, MD   pantoprazole (PROTONIX) 40 MG tablet Take 1 tablet by mouth every morning (before breakfast)  Patient not taking: Reported on 12/8/2022  Miguelito Jimenez MD   albuterol sulfate HFA (VENTOLIN HFA) 108 (90 Base) MCG/ACT inhaler Inhale 2 puffs into the lungs 4 times daily as needed for Wheezing  Miguelito Jimenez MD   clobetasol (TEMOVATE) 0.05 % cream Apply to affected areas twice daily for up to 2 weeks or until improved. Lucrecia Hutchison MD   selenium sulfide (SELSUN) 2.5 % lotion DISPENSE SHAMPOO. USE TO [address] THE HAIR 3 TIMES WEEKLY. LEAVE ON SCALP FOR 5 MINUTES PRIOR TO Lala Beauchamp MD   acetaminophen (TYLENOL) 500 MG tablet TAKE 2 TABLETS BY MOUTH EVERY 6 (SIX) HOURS AS NEEDED (FOR PAIN). Historical Provider, MD   ibuprofen (IBU) 800 MG tablet Take 1 tablet by mouth every 8 hours as needed for Pain.  Take 1 tab every 8 hours with food as needed for pain and swelling    Lalit Singh, [facility] Medications:  Current Facility-Administered Medications: lactated ringers infusion, , IntraVENous, Continuous  pantoprazole (PROTONIX) injection 40 mg, 40 mg, IntraVENous, BID     Social History:   Social History       Tobacco History       Smoking Status  Former Smoking Start Date  5/1/2022 Quit Date  11/1/2022 Smoking Frequency  0.50 packs/day for 20.00 years (10.00 pk-yrs)    Smoking Tobacco Type  Cigarettes from 5/1/2022 to 11/1/2022      Passive Exposure  Current      Smokeless Tobacco Use  Never      Tobacco Comments  9/13/22 - smokes 1/2 ppd              Alcohol History       Alcohol Use Status  Yes Drinks/Week  0 Standard drinks or equivalent per week Amount  0.0 standard drinks of alcohol/wk Comment  5 glasses wine/mo              Drug Use       Drug Use Status  No              Sexual Activity       Sexually Active  Not Asked                     Family History:  Family History   Problem Relation Age of Onset    Cancer Mother         skin Cancer Sister         skin    Diabetes Sister     Diabetes Maternal Aunt     Hypertension Father     Hypertension Sister         Allergies: Allergies   Allergen Reactions    Codeine Hives    Morphine And Related Other (See Comments) and Rash     ineffective      Percocet [Oxycodone-Acetaminophen] Itching    Prednisone     Tetracyclines & Related         ROS:   General: No fever or weight change  Hematologic: No unexpected submucosal bleeding or bruising  HEENT: No sore throat or facial pain  Respiratory: No cough or dyspnea  Cardiovascular: No angina or dependent edema  Gastrointestinal: See HPI  Musculoskeletal: No usual joint pain or stiffness  Skin: No skin eruptions or changing lesions  Neurologic: No focal weakness or numbness  Psychiatric: No anxiety or sleep disturbance    Physical Exam:  Vital Signs: There were no vitals filed for this visit. General: Well-nourished, well-developed  HEENT: Sclera anicteric, mucosal membranes moist  Cardiovascular: Regular rate and rhythm. No murmurs. Respiratory: Respirations nonlabored, no crepitus  GI: Abdomen nondistended and soft. + upper abdominal tenderness with palpation. Normal active bowel sounds. No masses palpable. Rectal: Deferred  Musculoskeletal: No pitting edema of the lower legs. Neurological: Gross memory appears intact. Patient is alert and oriented      Recent Imaging:   MRI BRAIN WO CONTRAST  Narrative: Diann Jacobs    EXAM: TEMPORARY    INDICATION: Dizziness    COMPARISON: None    TECHNIQUE: Multiplanar, multisequence MR imaging of the head obtained. IV contrast: None. FINDINGS:    MIDLINE STRUCTURES: The sella turcica and pituitary gland are normal. Craniovertebral alignment and cerebellar tonsils are normal.    VENTRICLES: Normal size and configuration for patient age. INTRACRANIAL HEMORRHAGE: None. BRAIN PARENCHYMA: Brain parenchyma is normal signal. There is no diffusion restriction.   No midline shift or mass effect. INTRACRANIAL VASCULATURE: Major intracranial vessels are grossly patent. ORBITS: Normal.    BONE MARROW: Bone marrow signal within normal limits. VISUALIZED CERVICAL SPINE: Normal    PARANASAL SINUSES/MASTOID BONES: No acute sinusitis or mastoiditis. EXTRACRANIAL SOFT TISSUES: Normal  Impression: 1. Negative. Labs:   No results for input(s): HGB, WBC, PROT, LABALBU, ALKPHOS, ALT, AST, BILITOT, BILIDIR, IBILI in the last 72 hours. Assessment:    47year old F with PMH significant for anxiety, atrophic kidney, skin cancer, endometriosis, migraines, narcolepsy, and nephrolithiasis. Presents with worsening abdominal pain, distention, and bloating. EGD in September showed antral ulcer. Patient did not complete course of PPI. Significant tenderness noted in the epigastric region. Plan:  Check CBC, CMP, PT/INR, and lipase  CT A/P to further evaluate pain  Tentatively plan for EGD tomorrow  Continue PPI  NPO for now  If CT and EGD are unremarkable would consider gastric emptying study  Supportive care      [place]ENIX Torres - TAMMY Joseph    [phone].  Also available via [employer]

## 2022-12-08 NOTE — CARE COORDINATION
CASE MANAGEMENT INITIAL ASSESSMENT      Reviewed chart and completed assessment with patient:bedside  Family present: none  Explained Case Management role/services. yes    Primary contact information:[employer] [facility] Decision Maker :   Primary Decision Maker: Erica Camilo - Parent - [phone]    Secondary Decision Maker: Temo Ordonez - Brother/Sister - [phone]          Can this person be reached and be able to respond quickly, such as within a few minutes or hours? Yes      Admit date/status:12/8/22/ In  Diagnosis:Abdominal pain   Is this a Readmission?:  No      Insurance:[employer]   Precert required for SNF: Yes       3 night stay required: No    Living arrangements, Adls, care needs, prior to admission: Patient lives alone. [address] at home:  Walker__Cane__RTS__ BSC__Shower Chair__  02__ HHN__ CPAP__  BiPap__  Hospital Bed__ W/C___ Other_____    Services in the home and/or outpatient, prior to admission:none    Current PCP:Robert Maxie Meigs                                Medications: Prescription coverage? Yes     Transportation needs: none         PT/OT recs:none    Hospital Exemption Notification (HEN):needed for SNF    Barriers to discharge:lives alone    Plan/comments:Patient plans to return home alone. Patient in [place], drives and works. Patient is denying any needs at this time. Will continue to follow.      Nasreen Sibley RN

## 2022-12-08 NOTE — PATIENT INSTRUCTIONS
ASSESSMENT/PLAN:   Diagnosis Orders   1. Shortness of breath        2. Continuous tobacco abuse        3. Gastroesophageal reflux disease without esophagitis        4. Narcolepsy and cataplexy        5. Insomnia, unspecified type        6. DARIEL (obstructive sleep apnea)        7. Class 2 obesity with body mass index (BMI) of 38.0 to 38.9 in adult, unspecified obesity type, unspecified whether serious comorbidity present          Narcolepsy with Cataplexy  OF NOTE: Stephanie narcolepsy is very severe and has lead to be on social security and incapable of self support     Still having RBD a, crying and dreaming more at night, now also having walking out of the bedroom at night at times, fell down steps     Having cataplexy, still about the same, getting this about 2-3 times a day     Tried and failed with: nortryptiline and xyrem (tried 2 times)   Tried and failed with Wakix, had side effects, adderall, vyvanse,   Had reaction now a side effect of tongue drying  Adderall 30 mg twice a day    Weight is at 253 from 247 from 250 from 243 from  200 from 191 and was 216 coming down  Will send to gastric surgeon for weight loss         Continue with:  Klonopin 2 mg at night   Melatonin 20 mg at night  Amitriptyline 50 mg at night  Ritalin 20 mg Three times- will try to back off later after the dariel has been treated        Stress is a trigger    Now having cataplexy at about 3 times a day  And the arms are very hard to move        DARIEL        \  DME [facility]    Autopap is set at min of 5 and max of 20  Using with full facemask  I have access via airview    Need to use at least 4 hours a day    Will need to start using   Setup date was 9/19/22    Unable to use the cpap    May need to consider inspire therapy  You may be a good candidate for Inspire upper airway stimulation to treat your moderate to severe obstructive sleep apnea. For more information visit www. [email or web address] com    The ENT I refer to is Dr.L Mirta Burkitt DO or Dr. Thong Chavez, Otolaryngologist.          Smoking  Stopped smoking in Nov 2022        LDCT scan done 3/1/22     No suspicious nodules. No acute thoracic findings. Areas of mild scattered atelectasis/scarring throughout the lungs. LUNG-RADS CATEGORY:   1: No nodules or definitely benign nodules. (Nodule/s with specific calcifications: complete, central, popcorn, concentric rings and fat containing nodules)         MODIFIER: None     NODULE RECOMMENDATION:  Category 1 or 2: Continue annual low dose CT lung cancer screening. Patient should return in one year for a follow-up exam         covid vaccination x 2,       Shortness of breath    continue  Albuterol 2 puffs as needed, can use every 6 hours, using once a day          PFT   DATE OF PROCEDURE:  05/25/2022     Full PFT done. FEV1 of 2.49, 78% predicted, FVC of 3.53, 88% predicted,  FEV1 to FVC ratio was 70% showing no obstructive lung defect. Lung  volumes do not show air trapping, hyperinflation, or restrictive lung  defect. Diffusion is normal when adjusted for alveolar ventilation. IMPRESSION:  Normal spirometry. Lung volumes showed diffusion. Flow  volume loops are slightly flattened. Expiratory loop could have a  variable intrathoracic obstruction. methacholine challenge    DATE OF PROCEDURE:  05/25/2022     METHACHOLINE CHALLENGE     Five doses of incremental increasing doses of methacholine were given. There was no drop in FEV1 less than 20%. IMPRESSION:  Negative methacholine challenge. Echo of the heart b/c of issus of long term amphetamine and dizziness and shortness breath. Conclusions      Summary   Normal left ventricle systolic function with an estimated ejection fraction   of 55%. Definity contrast administered with no evidence of left ventricular mass or   thrombus noted. No regional wall motion abnormalities are seen. Grade I diastolic dysfunction with normal filing pressure.    No significant valvular heart disease noted. GERD   Seen by GI  And had EGD showing ulcers  Will need to be on therapy  Seen by Dr. Charleen English    Omeprazole 40 mg once a day-patient stopped this  I have called nurse practitioner with gastric health explained the situation  Will call for admission into the hospital  Patient may be having issues with her stomach     Patient has been having episodes of feeling weak and presyncopal  Had 1 episode of syncope  Discussed with hospitalist and will have a direct admission      Will send patient for admission          Please keep all of your future appointments scheduled by [address], [facility] office. Out of respect for other patients and providers, you may be asked to reschedule your appointment if you arrive later than your scheduled appointment time. Appointments cancelled less than 24hrs in advance will be considered a no show. Patients with three missed appointments within 1 year or four missed appointments within 2 years can be dismissed from the practice. Please be aware that our physicians are required to work in the Intensive Care Unit at [facility].  Your appointment may need to be rescheduled if they are designated to work during your appointment time. You may receive a survey regarding the care you received during your visit. Your input is valuable to us. We encourage you to complete and return your survey. We hope you will choose us in the future for your healthcare needs. Pt instructed of all future appointment dates & times, including radiology, labs, procedures & referrals. If procedures were scheduled preparation instructions provided. Instructions on future appointments with [facility] were given.

## 2022-12-08 NOTE — LETTER
[address] [facility]  [address] [facility] [address] [address]  Phone: [phone]  Fax: [phone]    Destiney Cline MD        December 8, 2022     Patient: Jennifer Orozco   YOB: 1970   Date of Visit: 12/8/2022 12/8/22        Rodo Reynas      I have seen this patient in the office today and wanted to communicate my findings and recommendations. Patient Instructions           ASSESSMENT/PLAN:   Diagnosis Orders   1. Shortness of breath        2. Continuous tobacco abuse        3. Gastroesophageal reflux disease without esophagitis        4. Narcolepsy and cataplexy        5. Insomnia, unspecified type        6. DARIEL (obstructive sleep apnea)        7.  Class 2 obesity with body mass index (BMI) of 38.0 to 38.9 in adult, unspecified obesity type, unspecified whether serious comorbidity present          Narcolepsy with Cataplexy  OF NOTE: Stephanie narcolepsy is very severe and has lead to be on social security and incapable of self support     Still having RBD a, crying and dreaming more at night, now also having walking out of the bedroom at night at times, fell down steps     Having cataplexy, still about the same, getting this about 2-3 times a day     Tried and failed with: nortryptiline and xyrem (tried 2 times)   Tried and failed with Wakix, had side effects, adderall, vyvanse,   Had reaction now a side effect of tongue drying  Adderall 30 mg twice a day    Weight is at 253 from 247 from 250 from 243 from  200 from 191 and was 216 coming down  Will send to gastric surgeon for weight loss         Continue with:  Klonopin 2 mg at night   Melatonin 20 mg at night  Amitriptyline 50 mg at night  Ritalin 20 mg Three times- will try to back off later after the dariel has been treated        Stress is a trigger    Now having cataplexy at about 3 times a day  And the arms are very hard to move        DARIEL        \  DME [facility]    Autopap is set at min of 5 and max of 20  Using with full facemask  I have access via airview    Need to use at least 4 hours a day    Will need to start using   Setup date was 9/19/22    Unable to use the cpap    May need to consider inspire therapy  You may be a good candidate for Inspire upper airway stimulation to treat your moderate to severe obstructive sleep apnea. For more information visit www. [email or web address]. [employer]    The ENT I refer to is Dr.L Fransico Brush DO or Dr. Gerald Lux, Otolaryngologist.          Smoking  Stopped smoking in Nov 2022        LDCT scan done 3/1/22     No suspicious nodules. No acute thoracic findings. Areas of mild scattered atelectasis/scarring throughout the lungs. LUNG-RADS CATEGORY:   1: No nodules or definitely benign nodules. (Nodule/s with specific calcifications: complete, central, popcorn, concentric rings and fat containing nodules)         MODIFIER: None     NODULE RECOMMENDATION:  Category 1 or 2: Continue annual low dose CT lung cancer screening. Patient should return in one year for a follow-up exam         covid vaccination x 2,       Shortness of breath    continue  Albuterol 2 puffs as needed, can use every 6 hours, using once a day          PFT   DATE OF PROCEDURE:  05/25/2022     Full PFT done. FEV1 of 2.49, 78% predicted, FVC of 3.53, 88% predicted,  FEV1 to FVC ratio was 70% showing no obstructive lung defect. Lung  volumes do not show air trapping, hyperinflation, or restrictive lung  defect. Diffusion is normal when adjusted for alveolar ventilation. IMPRESSION:  Normal spirometry. Lung volumes showed diffusion. Flow  volume loops are slightly flattened. Expiratory loop could have a  variable intrathoracic obstruction. methacholine challenge    DATE OF PROCEDURE:  05/25/2022     METHACHOLINE CHALLENGE     Five doses of incremental increasing doses of methacholine were given. There was no drop in FEV1 less than 20%.      IMPRESSION:  Negative methacholine challenge. Echo of the heart b/c of issus of long term amphetamine and dizziness and shortness breath. Conclusions      Summary   Normal left ventricle systolic function with an estimated ejection fraction   of 55%. Definity contrast administered with no evidence of left ventricular mass or   thrombus noted. No regional wall motion abnormalities are seen. Grade I diastolic dysfunction with normal filing pressure. No significant valvular heart disease noted. GERD   Seen by GI  And had EGD showing ulcers  Will need to be on therapy  Seen by Dr. Ignacio Sanchez    Omeprazole 40 mg once a day-patient stopped this  I have called nurse practitioner with gastric health explained the situation  Will call for admission into the hospital  Patient may be having issues with her stomach     Patient has been having episodes of feeling weak and presyncopal  Had 1 episode of syncope  Discussed with hospitalist and will have a direct admission      Will send patient for admission          Please keep all of your future appointments scheduled by [address], [place] [facility] office. Out of respect for other patients and providers, you may be asked to reschedule your appointment if you arrive later than your scheduled appointment time. Appointments cancelled less than 24hrs in advance will be considered a no show. Patients with three missed appointments within 1 year or four missed appointments within 2 years can be dismissed from the practice. Please be aware that our physicians are required to work in the Intensive Care Unit at [facility].  Your appointment may need to be rescheduled if they are designated to work during your appointment time. You may receive a survey regarding the care you received during your visit. Your input is valuable to us. We encourage you to complete and return your survey.   We hope you will choose us in the future for your healthcare needs. Pt instructed of all future appointment dates & times, including radiology, labs, procedures & referrals. If procedures were scheduled preparation instructions provided. Instructions on future appointments with [facility] were given.                            Thank you for allowing me to assist in the care of the MD Sara Newby MD

## 2022-12-09 ENCOUNTER — ANESTHESIA (OUTPATIENT)
Dept: ENDOSCOPY | Age: 52
End: 2022-12-09
Payer: MEDICARE

## 2022-12-09 ENCOUNTER — ANESTHESIA EVENT (OUTPATIENT)
Dept: ENDOSCOPY | Age: 52
End: 2022-12-09
Payer: MEDICARE

## 2022-12-09 VITALS
RESPIRATION RATE: 15 BRPM | DIASTOLIC BLOOD PRESSURE: 94 MMHG | OXYGEN SATURATION: 99 % | HEIGHT: 70 IN | TEMPERATURE: 97.9 F | SYSTOLIC BLOOD PRESSURE: 127 MMHG | BODY MASS INDEX: 36.51 KG/M2 | HEART RATE: 82 BPM | WEIGHT: 255 LBS

## 2022-12-09 LAB
A/G RATIO: 1.6 (ref 1.1–2.2)
ALBUMIN SERPL-MCNC: 4.1 G/DL (ref 3.4–5)
ALP BLD-CCNC: 102 U/L (ref 40–129)
ALT SERPL-CCNC: 28 U/L (ref 10–40)
ANION GAP SERPL CALCULATED.3IONS-SCNC: 12 MMOL/L (ref 3–16)
AST SERPL-CCNC: 24 U/L (ref 15–37)
BASOPHILS ABSOLUTE: 0.1 K/UL (ref 0–0.2)
BASOPHILS RELATIVE PERCENT: 1 %
BILIRUB SERPL-MCNC: 0.5 MG/DL (ref 0–1)
BUN BLDV-MCNC: 10 MG/DL (ref 7–20)
CALCIUM SERPL-MCNC: 9.2 MG/DL (ref 8.3–10.6)
CHLORIDE BLD-SCNC: 101 MMOL/L (ref 99–110)
CO2: 23 MMOL/L (ref 21–32)
CREAT SERPL-MCNC: 0.6 MG/DL (ref 0.6–1.1)
EOSINOPHILS ABSOLUTE: 0.1 K/UL (ref 0–0.6)
EOSINOPHILS RELATIVE PERCENT: 1.5 %
GFR SERPL CREATININE-BSD FRML MDRD: >60 ML/MIN/{1.73_M2}
GLUCOSE BLD-MCNC: 97 MG/DL (ref 70–99)
HCT VFR BLD CALC: 37.3 % (ref 36–48)
HEMOGLOBIN: 12.5 G/DL (ref 12–16)
LYMPHOCYTES ABSOLUTE: 2.6 K/UL (ref 1–5.1)
LYMPHOCYTES RELATIVE PERCENT: 50.6 %
MCH RBC QN AUTO: 30.5 PG (ref 26–34)
MCHC RBC AUTO-ENTMCNC: 33.6 G/DL (ref 31–36)
MCV RBC AUTO: 90.8 FL (ref 80–100)
MONOCYTES ABSOLUTE: 0.5 K/UL (ref 0–1.3)
MONOCYTES RELATIVE PERCENT: 9.4 %
NEUTROPHILS ABSOLUTE: 1.9 K/UL (ref 1.7–7.7)
NEUTROPHILS RELATIVE PERCENT: 37.5 %
PDW BLD-RTO: 12.6 % (ref 12.4–15.4)
PLATELET # BLD: 305 K/UL (ref 135–450)
PMV BLD AUTO: 7.5 FL (ref 5–10.5)
POTASSIUM REFLEX MAGNESIUM: 4 MMOL/L (ref 3.5–5.1)
RBC # BLD: 4.11 M/UL (ref 4–5.2)
SODIUM BLD-SCNC: 136 MMOL/L (ref 136–145)
TOTAL PROTEIN: 6.7 G/DL (ref 6.4–8.2)
WBC # BLD: 5.2 K/UL (ref 4–11)

## 2022-12-09 PROCEDURE — 6360000002 HC RX W HCPCS

## 2022-12-09 PROCEDURE — 3700000001 HC ADD 15 MINUTES (ANESTHESIA): Performed by: INTERNAL MEDICINE

## 2022-12-09 PROCEDURE — 3700000000 HC ANESTHESIA ATTENDED CARE: Performed by: INTERNAL MEDICINE

## 2022-12-09 PROCEDURE — 36415 COLL VENOUS BLD VENIPUNCTURE: CPT

## 2022-12-09 PROCEDURE — 2580000003 HC RX 258

## 2022-12-09 PROCEDURE — 7100000011 HC PHASE II RECOVERY - ADDTL 15 MIN: Performed by: INTERNAL MEDICINE

## 2022-12-09 PROCEDURE — 0DB98ZX EXCISION OF DUODENUM, VIA NATURAL OR ARTIFICIAL OPENING ENDOSCOPIC, DIAGNOSTIC: ICD-10-PCS | Performed by: INTERNAL MEDICINE

## 2022-12-09 PROCEDURE — 2580000003 HC RX 258: Performed by: INTERNAL MEDICINE

## 2022-12-09 PROCEDURE — 88305 TISSUE EXAM BY PATHOLOGIST: CPT

## 2022-12-09 PROCEDURE — 3609012400 HC EGD TRANSORAL BIOPSY SINGLE/MULTIPLE: Performed by: INTERNAL MEDICINE

## 2022-12-09 PROCEDURE — 0DB68ZX EXCISION OF STOMACH, VIA NATURAL OR ARTIFICIAL OPENING ENDOSCOPIC, DIAGNOSTIC: ICD-10-PCS | Performed by: INTERNAL MEDICINE

## 2022-12-09 PROCEDURE — 7100000010 HC PHASE II RECOVERY - FIRST 15 MIN: Performed by: INTERNAL MEDICINE

## 2022-12-09 PROCEDURE — 85025 COMPLETE CBC W/AUTO DIFF WBC: CPT

## 2022-12-09 PROCEDURE — 80053 COMPREHEN METABOLIC PANEL: CPT

## 2022-12-09 PROCEDURE — 2709999900 HC NON-CHARGEABLE SUPPLY: Performed by: INTERNAL MEDICINE

## 2022-12-09 RX ORDER — PROPOFOL 10 MG/ML
INJECTION, EMULSION INTRAVENOUS PRN
Status: DISCONTINUED | OUTPATIENT
Start: 2022-12-09 | End: 2022-12-09 | Stop reason: SDUPTHER

## 2022-12-09 RX ORDER — OMEPRAZOLE 40 MG/1
40 CAPSULE, DELAYED RELEASE ORAL
Qty: 30 CAPSULE | Refills: 1 | Status: SHIPPED | OUTPATIENT
Start: 2022-12-09

## 2022-12-09 RX ORDER — SODIUM CHLORIDE, SODIUM LACTATE, POTASSIUM CHLORIDE, CALCIUM CHLORIDE 600; 310; 30; 20 MG/100ML; MG/100ML; MG/100ML; MG/100ML
INJECTION, SOLUTION INTRAVENOUS CONTINUOUS PRN
Status: DISCONTINUED | OUTPATIENT
Start: 2022-12-09 | End: 2022-12-09 | Stop reason: SDUPTHER

## 2022-12-09 RX ADMIN — PROPOFOL 100 MG: 10 INJECTION, EMULSION INTRAVENOUS at 11:40

## 2022-12-09 RX ADMIN — SODIUM CHLORIDE, SODIUM LACTATE, POTASSIUM CHLORIDE, AND CALCIUM CHLORIDE: .6; .31; .03; .02 INJECTION, SOLUTION INTRAVENOUS at 11:34

## 2022-12-09 RX ADMIN — PROPOFOL 50 MG: 10 INJECTION, EMULSION INTRAVENOUS at 11:47

## 2022-12-09 RX ADMIN — SODIUM CHLORIDE, POTASSIUM CHLORIDE, SODIUM LACTATE AND CALCIUM CHLORIDE: 600; 310; 30; 20 INJECTION, SOLUTION INTRAVENOUS at 11:56

## 2022-12-09 RX ADMIN — SODIUM CHLORIDE, POTASSIUM CHLORIDE, SODIUM LACTATE AND CALCIUM CHLORIDE: 600; 310; 30; 20 INJECTION, SOLUTION INTRAVENOUS at 03:42

## 2022-12-09 RX ADMIN — PROPOFOL 100 MG: 10 INJECTION, EMULSION INTRAVENOUS at 11:43

## 2022-12-09 ASSESSMENT — PAIN DESCRIPTION - DESCRIPTORS
DESCRIPTORS: DISCOMFORT
DESCRIPTORS: BURNING

## 2022-12-09 ASSESSMENT — PAIN DESCRIPTION - LOCATION: LOCATION: ABDOMEN

## 2022-12-09 ASSESSMENT — PAIN - FUNCTIONAL ASSESSMENT: PAIN_FUNCTIONAL_ASSESSMENT: 0-10

## 2022-12-09 ASSESSMENT — PAIN SCALES - GENERAL: PAINLEVEL_OUTOF10: 5

## 2022-12-09 NOTE — PROGRESS NOTES
Patient back from EGD, VSS. Gastric emptying study cannot be completed until Monday. [facility] asked me to reach out to MD to see if this test can be done outpatient or if they want patient to remain inpatient through the weekend for testing to be completed. Awaiting response.

## 2022-12-09 NOTE — PROCEDURES
EGD PROCEDURE NOTE         Esophagogastroduodenoscopy Procedure Note     Patient: Arnoldo Serrano MRN: [id number]   YOB: 1970 Age: 47 y.o. Sex: female   Unit: [facility] Room/Bed: [facility] Endo Pool/NONE Location: [address]    Admitting Physician: Sobeida Forbes     Primary Care Physician: Bobby Wood MD      DATE OF PROCEDURE: 12/9/2022  PROCEDURE: Esophagogastroduodenoscopy  INDICATION: This is a 47y.o. year old female who presents today abdominal pain and nausea  ENDOSCOPIST: Tonie Kayser Debo, DO    POSTOPERATIVE DIAGNOSIS: Normal EGD    PLAN: Await results of biopsies. Nuclear medicine gastric emptying study. INFORMED CONSENT:  Informed consent for esophagogastoduodenoscopy was obtained. The benefits and risks including adverse medicine reaction have been explained. The patient's questions were answered and the patient agreed to proceed. ASA:  ASA 2 - Patient with mild systemic disease with no functional limitations    SEDATION: MAC     The patient's vital signs, cardiac status, pulmonary status, abdominal status and mental status were stable for the procedure. The patient's vitals signs and respiratory function as monitored by oxygen saturation were stable throughout    Procedure Details: The Olympus videoendoscope was inserted into the mouth and carefully passed into the esophagus, through the stomach and to the distal duodenum. Antegrade and retrograde examination of the upper gi tract was carefully performed. Findings: The esophagus is normal.  The z-line is distinct without lesions or irregularities. There is no evidence of Morrow's esophagus. The scope easily passed into the stomach. The mucosa of the cardia, fundus, body and antrum of the stomach is normal.  The pylorus is patent and of normal contour. The scope easily advanced into the duodenal bulb and down to the distal duodenum.   Ante-and retrograde exam of the duodenum is normal.  Biopsies were obtained with a cold biopsy forceps of the second portion of the duodenum as well as the antrum. Patient had a large amount of anxiety and was thrashing around throughout the procedure so the procedure was terminated as quickly as possible.   Gastric or Duodenal ulcer present: No    Estimated Blood Loss: None      Signed By: John Covarrubias DO

## 2022-12-09 NOTE — ANESTHESIA PRE PROCEDURE
Department of Anesthesiology  Preprocedure Note       Name:  Abimbola Adair   Age:  47 y.o.  :  1970                                          MRN:  [id number]         Date:  2022      Surgeon: Anand Vargas):  Wily Hamilton DO    Procedure: Procedure(s):  EGD DIAGNOSTIC ONLY    Medications prior to admission:   Prior to Admission medications    Medication Sig Start Date End Date Taking? Authorizing Provider   Probiotic Product (PROBIOTIC DAILY PO) Take by mouth    Historical Provider, MD   methylphenidate (RITALIN) 20 MG tablet Take 1 tablet by mouth 3 times daily for 30 days. 22  Senthil Lepe MD   clonazePAM (KLONOPIN) 1 MG tablet TAKE 2 TABLETS BY MOUTH AT BEDTIME  Patient not taking: Reported on 2022 10/28/22 1/26/23  Senthil Lepe MD   cyclobenzaprine (FLEXERIL) 10 MG tablet Take 10 mg by mouth at bedtime    Historical Provider, MD   escitalopram (LEXAPRO) 5 MG tablet Take 2.5 mg by mouth daily    Historical Provider, MD   omeprazole (PRILOSEC) 40 MG delayed release capsule Take 1 capsule by mouth every morning (before breakfast)  Patient not taking: No sig reported 10/20/22   Senthil Lepe MD   amitriptyline (ELAVIL) 50 MG tablet TAKE 1 TABLET BY MOUTH NIGHTLY  Patient not taking: Reported on 2022   Senthil Lepe MD   clonazePAM (KLONOPIN) 0.5 MG tablet Take 0.25 mg by mouth nightly as needed. Patient not taking: No sig reported    Historical Provider, MD   pantoprazole (PROTONIX) 40 MG tablet Take 1 tablet by mouth every morning (before breakfast)  Patient not taking: No sig reported 22   Senthil Lepe MD   albuterol sulfate HFA (VENTOLIN HFA) 108 (90 Base) MCG/ACT inhaler Inhale 2 puffs into the lungs 4 times daily as needed for Wheezing 22   Senthil Lepe MD   clobetasol (TEMOVATE) 0.05 % cream Apply to affected areas twice daily for up to 2 weeks or until improved.  22   Jina Katz MD   selenium sulfide (SELSUN) 2.5 % lotion DISPENSE SHAMPOO. USE TO [facility] THE HAIR 3 TIMES WEEKLY. LEAVE ON SCALP FOR 5 MINUTES PRIOR TO RINSING  Patient not taking: Reported on 12/8/2022 11/19/21   Natali Albarran MD   acetaminophen (TYLENOL) 500 MG tablet TAKE 2 TABLETS BY MOUTH EVERY 6 (SIX) HOURS AS NEEDED (FOR PAIN). 10/2/20   Historical Provider, MD   ibuprofen (IBU) 800 MG tablet Take 1 tablet by mouth every 8 hours as needed for Pain. Take 1 tab every 8 hours with food as needed for pain and swelling   12/27/12 5/4/22  Lisa Amin DPM       Current medications:    Current Facility-Administered Medications   Medication Dose Route Frequency Provider Last Rate Last Admin    lactated ringers infusion   IntraVENous Continuous Creed Hamman,  mL/hr at 12/09/22 0342 New Bag at 12/09/22 0342    pantoprazole (PROTONIX) injection 40 mg  40 mg IntraVENous BID Creed Hamman, MD        sodium chloride flush 0.9 % injection 5-40 mL  5-40 mL IntraVENous 2 times per day Creed Hamman, MD        sodium chloride flush 0.9 % injection 5-40 mL  5-40 mL IntraVENous PRN Creed Hamman, MD        0.9 % sodium chloride infusion   IntraVENous PRN Creed Hamman, MD        polyethylene glycol (GLYCOLAX) packet 17 g  17 g Oral Daily PRN Creed Hamman, MD        acetaminophen (TYLENOL) tablet 650 mg  650 mg Oral Q6H PRN Creed Hamman, MD        Or    acetaminophen (TYLENOL) suppository 650 mg  650 mg Rectal Q6H PRN Creed Hamman, MD        prochlorperazine (COMPAZINE) injection 10 mg  10 mg IntraVENous Q6H PRN Creed Hamman, MD        enoxaparin Sodium (LOVENOX) injection 30 mg  30 mg SubCUTAneous BID Creed Hamman, MD           Allergies:     Allergies   Allergen Reactions    Codeine Hives    Morphine And Related Other (See Comments) and Rash     ineffective      Percocet [Oxycodone-Acetaminophen] Itching    Prednisone     Tetracyclines & Related        Problem List:    Patient Active Problem List   Diagnosis Code    Narcolepsy O13.912  Bilateral leg edema R60.0    Atrophic kidney N26.1    Cellulitis L03.90    Tobacco use disorder F17.200    Endometriosis N80.9    Ovarian cyst N83.209    Nephrolithiasis N20.0    Lumbar disc disorder M51.9    Fatigue R53.83    Pain, pelvic, female R10.2    Calf pain M79.669    Cataplexy and narcolepsy G47. 411    Abdominal pain R10.9       Past Medical History:        Diagnosis Date    Anxiety     Atrophic kidney     left    Cancer ([address]     skin (basal cell)    Cellulitis     recurrent    Cervical disc herniation     Endometriosis     Lumbar disc disorder 2012    Migraines     Narcolepsy     Nephrolithiasis 2012    Ovarian cyst 2012       Past Surgical History:        Procedure Laterality Date    FOOT SURGERY  2012    BROSTROM ANKLE LIGAMENT REPAIR LEFT, PARTIAL EXCISION OF BONE    LAPAROSCOPY      for endometriosis    LUMBAR FUSION      SHOULDER SURGERY      left    TONSILLECTOMY  2006    UPPER GASTROINTESTINAL ENDOSCOPY N/A 9/15/2022    EGD BIOPSY performed by Megan De Leon MD at [facility]. [facility] History:    Social History     Tobacco Use    Smoking status: Former     Packs/day: 0.50     Years: 20.00     Pack years: 10.00     Types: Cigarettes     Start date: 2022     Quit date: 2022     Years since quittin.1     Passive exposure: Current    Smokeless tobacco: Never    Tobacco comments:     22 - smokes 1/2 ppd   Substance Use Topics    Alcohol use:  Yes     Alcohol/week: 0.0 standard drinks     Comment: 5 glasses wine/mo                                Counseling given: Not Answered  Tobacco comments: 22 - smokes 1/2 ppd      Vital Signs (Current):   Vitals:    22 1730 22 0007 22 0835 22 1042   BP:  129/81 135/80 135/78   Pulse:  78 77 67   Resp:  16 16 16   Temp:  97.7 °F (36.5 °C) 97.9 °F (36.6 °C)    TempSrc:  Oral Oral    SpO2:  97% 97% 97%   Weight: 255 lb (115.7 kg)      Height: 5' 10\" (1.778 m)                                                 BP Readings from Last 3 Encounters:   12/09/22 135/78   12/08/22 133/86   11/12/22 (!) 148/88       NPO Status: Time of last liquid consumption: 2000                        Time of last solid consumption: 2000                        Date of last liquid consumption: 12/07/22                        Date of last solid food consumption: 12/07/22    BMI:   Wt Readings from Last 3 Encounters:   12/08/22 255 lb (115.7 kg)   12/08/22 255 lb (115.7 kg)   11/11/22 255 lb (115.7 kg)     Body mass index is 36.59 kg/m². CBC:   Lab Results   Component Value Date/Time    WBC 5.2 12/09/2022 07:01 AM    RBC 4.11 12/09/2022 07:01 AM    HGB 12.5 12/09/2022 07:01 AM    HCT 37.3 12/09/2022 07:01 AM    MCV 90.8 12/09/2022 07:01 AM    RDW 12.6 12/09/2022 07:01 AM     12/09/2022 07:01 AM       CMP:   Lab Results   Component Value Date/Time     12/09/2022 07:01 AM    K 4.0 12/09/2022 07:01 AM     12/09/2022 07:01 AM    CO2 23 12/09/2022 07:01 AM    BUN 10 12/09/2022 07:01 AM    CREATININE 0.6 12/09/2022 07:01 AM    GFRAA >60 08/19/2019 12:36 AM    GFRAA 124 12/11/2011 04:00 AM    AGRATIO 1.6 12/09/2022 07:01 AM    LABGLOM >60 12/09/2022 07:01 AM    GLUCOSE 97 12/09/2022 07:01 AM    PROT 6.7 12/09/2022 07:01 AM    CALCIUM 9.2 12/09/2022 07:01 AM    BILITOT 0.5 12/09/2022 07:01 AM    ALKPHOS 102 12/09/2022 07:01 AM    AST 24 12/09/2022 07:01 AM    ALT 28 12/09/2022 07:01 AM       POC Tests: No results for input(s): POCGLU, POCNA, POCK, POCCL, POCBUN, POCHEMO, POCHCT in the last 72 hours.     Coags:   Lab Results   Component Value Date/Time    PROTIME 12.5 12/08/2022 11:21 AM    INR 0.94 12/08/2022 11:21 AM       HCG (If Applicable):   Lab Results   Component Value Date    PREGTESTUR Negative 12/27/2012        ABGs: No results found for: PHART, PO2ART, SFM9RYJ, RUS6AWD, BEART, I2MJZKSA     Type & Screen (If Applicable):  No results found for: LABABO, [address]    Drug/Infectious Status (If Applicable):  No results found for: HIV, HEPCAB    COVID-19 Screening (If Applicable):   Lab Results   Component Value Date/Time    COVID19 Not Detected 04/30/2020 08:16 PM           Anesthesia Evaluation  Patient summary reviewed and Nursing notes reviewed  Airway: Mallampati: III  TM distance: >3 FB   Neck ROM: full  Mouth opening: > = 3 FB   Dental: normal exam         Pulmonary:Negative Pulmonary ROS and normal exam                               Cardiovascular:Negative CV ROS                      Neuro/Psych:   (+) headaches:,             GI/Hepatic/Renal: Neg GI/Hepatic/Renal ROS            Endo/Other: Negative Endo/Other ROS                    Abdominal:   (+) obese,     Abdomen: soft. Vascular: negative vascular ROS. Other Findings:           Anesthesia Plan      MAC     ASA 2       Induction: intravenous. Anesthetic plan and risks discussed with patient. Plan discussed with CRNA.     Attending anesthesiologist reviewed and agrees with Preprocedure content                KEYLA Steven MD   12/9/2022

## 2022-12-09 NOTE — DISCHARGE SUMMARY
Hospital Medicine Discharge Summary    Patient ID: Lakesha Oconnor      Patient's PCP: Meg Sykes MD    Admit Date: 12/8/2022     Discharge Date:   12/09/22     Admitting Provider: Ruben Stanford MD     Discharge Provider: Topher Colvin MD     Discharge Diagnoses: Active Hospital Problems    Diagnosis     Abdominal pain [R10.9]      Priority: Medium       The patient was seen and examined on day of discharge and this discharge summary is in conjunction with any daily progress note from day of discharge. Hospital Course:     Patient referred for admission with severe acute and persistent abdominal pain. Based on severity, initial suspicion was gastric perforation. However, imaging was negative for any acute intra-abdominal medical condition. EGD was done which was normal.  Noted that patient was not taking proton pump inhibitor. This was prescribed again. Gastroenterology recommends gastric emptying study which can be done as outpatient. Patient will be discharged home and follow-up with gastroenterology regarding getting gastric emptying study done and following up. Being discharged in stable condition. Physical Exam Performed:     BP (!) 127/94   Pulse 82   Temp 97.9 °F (36.6 °C) (Oral)   Resp 15   Ht 5' 10\" (1.778 m)   Wt 255 lb (115.7 kg)   LMP 11/22/2012   SpO2 99%   BMI 36.59 kg/m²       General appearance:  No apparent distress, appears stated age and cooperative. HEENT:  Normal cephalic, atraumatic without obvious deformity. Pupils equal, round, and reactive to light. Extra ocular muscles intact. Conjunctivae/corneas clear. Neck: Supple, with full range of motion. No jugular venous distention. Trachea midline. Respiratory:  Normal respiratory effort. Clear to auscultation, bilaterally without Rales/Wheezes/Rhonchi. Cardiovascular:  Regular rate and rhythm with normal S1/S2 without murmurs, rubs or gallops.   Abdomen: Soft, non-tender, non-distended with normal bowel sounds. Musculoskeletal:  No clubbing, cyanosis or edema bilaterally. Full range of motion without deformity. Skin: Skin color, texture, turgor normal.  No rashes or lesions. Neurologic:  Neurovascularly intact without any focal sensory/motor deficits. Cranial nerves: II-XII intact, grossly non-focal.  Psychiatric:  Alert and oriented, thought content appropriate, normal insight  Capillary Refill: Brisk,< 3 seconds   Peripheral Pulses: +2 palpable, equal bilaterally       Labs: For convenience and continuity at follow-up the following most recent labs are provided:      CBC:    Lab Results   Component Value Date/Time    WBC 5.2 12/09/2022 07:01 AM    HGB 12.5 12/09/2022 07:01 AM    HCT 37.3 12/09/2022 07:01 AM     12/09/2022 07:01 AM       Renal:    Lab Results   Component Value Date/Time     12/09/2022 07:01 AM    K 4.0 12/09/2022 07:01 AM     12/09/2022 07:01 AM    CO2 23 12/09/2022 07:01 AM    BUN 10 12/09/2022 07:01 AM    CREATININE 0.6 12/09/2022 07:01 AM    CALCIUM 9.2 12/09/2022 07:01 AM         Significant Diagnostic Studies    Radiology:   CT ABDOMEN PELVIS W IV CONTRAST Additional Contrast? None   Final Result   No acute abdominopelvic process. Additional nonemergent findings, as above. NM GASTRIC EMPTYING    (Results Pending)          Consults:     IP CONSULT TO GI    Disposition: Home    Condition at Discharge: Stable    Discharge Instructions/Follow-up: PCP, GI    Code Status:  Full Code     Activity: activity as tolerated    Diet: regular diet      Discharge Medications:     Current Discharge Medication List             Details   omeprazole (PRILOSEC) 40 MG delayed release capsule Take 1 capsule by mouth every morning (before breakfast)  Qty: 30 capsule, Refills: 1                Details   Probiotic Product (PROBIOTIC DAILY PO) Take by mouth      methylphenidate (RITALIN) 20 MG tablet Take 1 tablet by mouth 3 times daily for 30 days.   Qty: 90 tablet, Refills: 0    Associated Diagnoses: Narcolepsy and cataplexy      cyclobenzaprine (FLEXERIL) 10 MG tablet Take 10 mg by mouth at bedtime      escitalopram (LEXAPRO) 5 MG tablet Take 2.5 mg by mouth daily      albuterol sulfate HFA (VENTOLIN HFA) 108 (90 Base) MCG/ACT inhaler Inhale 2 puffs into the lungs 4 times daily as needed for Wheezing  Qty: 54 g, Refills: 1      clobetasol (TEMOVATE) 0.05 % cream Apply to affected areas twice daily for up to 2 weeks or until improved. Qty: 45 g, Refills: 0      acetaminophen (TYLENOL) 500 MG tablet TAKE 2 TABLETS BY MOUTH EVERY 6 (SIX) HOURS AS NEEDED (FOR PAIN). Time Spent on discharge: 46 minutes in the examination, evaluation, counseling and review of medications and discharge plan. Signed:    Addie Vera MD   12/9/2022      Thank you Pretty Arciniega MD for the opportunity to be involved in this patient's care. If you have any questions or concerns, please feel free to contact me at [phone].

## 2022-12-09 NOTE — PROGRESS NOTES
IV removed, D/C instructions given. Pt verbalizes understanding and all questions answered. Patient given information for Dr. Lizzette Burns for patient to schedule outpatient gastric emptying study.

## 2022-12-09 NOTE — PROGRESS NOTES
Patient to endo for EGD. Consent is in the chart, not signed, as patient wants to speak with DR prior to procedure.

## 2022-12-09 NOTE — ANESTHESIA POSTPROCEDURE EVALUATION
Department of Anesthesiology  Postprocedure Note    Patient: Sharon Sandhu  MRN: [id number]  YOB: 1970  Date of evaluation: 12/9/2022      Procedure Summary     Date: 12/09/22 Room / Location: [address]    Anesthesia Start: 1134 Anesthesia Stop: 1154    Procedure: EGD BIOPSY Diagnosis:       Epigastric pain      (Epigastric pain [R10.13])    Surgeons: Wan Reed DO Responsible Provider: Kenny Saucedo MD    Anesthesia Type: MAC ASA Status: 2          Anesthesia Type: No value filed.     Linnea Phase I: Linnea Score: 10    Linnea Phase II: Linnea Score: 10      Anesthesia Post Evaluation    Patient location during evaluation: PACU  Patient participation: complete - patient participated  Level of consciousness: awake  Pain score: 0  Airway patency: patent  Nausea & Vomiting: no nausea  Complications: no  Cardiovascular status: blood pressure returned to baseline  Respiratory status: acceptable  Hydration status: euvolemic

## 2022-12-09 NOTE — H&P
[facility] ENDO  Procedure H&P    Patient: Yodit Villatoro MRN: [id number]     YOB: 1970  Age: 47 y.o. Sex: female    Unit: [facility] ENDO Room/Bed: [facility] Endo Pool/NONE Location: [address]     Procedure: Procedure(s):  EGD DIAGNOSTIC ONLY    Indication:abdominal pain   Referring  Physician:  Lola Sanches     Brief history: Hx of ulcer not taking meds    Nurses past medical history notes reviewed and agreed. Medications reviewed.     Allergies: Codeine, Morphine and related, Percocet [oxycodone-acetaminophen], Prednisone, and Tetracyclines & related     Allergies noted: Yes     Past Medical History:   Past Medical History:   Diagnosis Date    Anxiety     Atrophic kidney     left    Cancer ([address]     skin (basal cell)    Cellulitis     recurrent    Cervical disc herniation     Endometriosis     Lumbar disc disorder 2012    Migraines     Narcolepsy     Nephrolithiasis 2012    Ovarian cyst 2012       Past Surgical History:   Past Surgical History:   Procedure Laterality Date    FOOT SURGERY  2012    BROSTROM ANKLE LIGAMENT REPAIR LEFT, PARTIAL EXCISION OF BONE    LAPAROSCOPY      for endometriosis    LUMBAR FUSION  2008    SHOULDER SURGERY      left    TONSILLECTOMY  2006    UPPER GASTROINTESTINAL ENDOSCOPY N/A 9/15/2022    EGD BIOPSY performed by Kevin Hancock MD at [facility]. [place] [id number] History:   Social History     Socioeconomic History    Marital status: Single     Spouse name: Not on file    Number of children: Not on file    Years of education: Not on file    Highest education level: Not on file   Occupational History    Not on file   Tobacco Use    Smoking status: Former     Packs/day: 0.50     Years: 20.00     Pack years: 10.00     Types: Cigarettes     Start date: 2022     Quit date: 2022     Years since quittin.1     Passive exposure: Current    Smokeless tobacco: Never    Tobacco comments:     22 - smokes 1/2 ppd   Vaping Use    Vaping Use: Former    Substances: Nicotine    Devices: Disposable   Substance and Sexual Activity    Alcohol use: Yes     Alcohol/week: 0.0 standard drinks     Comment: 5 glasses wine/mo    Drug use: No    Sexual activity: Not on file   Other Topics Concern    Not on file   Social History Narrative    Not on file     Social Determinants of Health     Financial Resource Strain: Not on file   Food Insecurity: Not on file   Transportation Needs: Not on file   Physical Activity: Not on file   Stress: Not on file   Social Connections: Not on file   Intimate Partner Violence: Not on file   Housing Stability: Not on file       Family History:   Family History   Problem Relation Age of Onset    Cancer Mother         skin    Cancer Sister         skin    Diabetes Sister     Diabetes Maternal Aunt     Hypertension Father     Hypertension Sister        Home Medications:   Prior to Admission medications    Medication Sig Start Date End Date Taking? Authorizing Provider   Probiotic Product (PROBIOTIC DAILY PO) Take by mouth    Historical Provider, MD   methylphenidate (RITALIN) 20 MG tablet Take 1 tablet by mouth 3 times daily for 30 days. 12/8/22 1/7/23  Bryon Lockett MD   clonazePAM (KLONOPIN) 1 MG tablet TAKE 2 TABLETS BY MOUTH AT BEDTIME  Patient not taking: Reported on 12/8/2022 10/28/22 1/26/23  Bryon Lockett MD   cyclobenzaprine (FLEXERIL) 10 MG tablet Take 10 mg by mouth at bedtime    Historical Provider, MD   escitalopram (LEXAPRO) 5 MG tablet Take 2.5 mg by mouth daily    Historical Provider, MD   omeprazole (PRILOSEC) 40 MG delayed release capsule Take 1 capsule by mouth every morning (before breakfast)  Patient not taking: No sig reported 10/20/22   Bryon Lockett MD   amitriptyline (ELAVIL) 50 MG tablet TAKE 1 TABLET BY MOUTH NIGHTLY  Patient not taking: Reported on 12/8/2022 9/30/22   Bryon Lockett MD   clonazePAM (KLONOPIN) 0.5 MG tablet Take 0.25 mg by mouth nightly as needed.   Patient not taking: No sig reported    Historical Provider, MD   pantoprazole (PROTONIX) 40 MG tablet Take 1 tablet by mouth every morning (before breakfast)  Patient not taking: No sig reported 9/13/22   Miguelito Jimenez MD   albuterol sulfate HFA (VENTOLIN HFA) 108 (90 Base) MCG/ACT inhaler Inhale 2 puffs into the lungs 4 times daily as needed for Wheezing 7/7/22   Miguelito Jimenez MD   clobetasol (TEMOVATE) 0.05 % cream Apply to affected areas twice daily for up to 2 weeks or until improved. 5/4/22   Lucrecia Hutchison MD   selenium sulfide (SELSUN) 2.5 % lotion DISPENSE SHAMPOO. USE TO [address] THE HAIR 3 TIMES WEEKLY. LEAVE ON SCALP FOR 5 MINUTES PRIOR TO RINSING  Patient not taking: Reported on 12/8/2022 11/19/21   Lucrecia Hutchison MD   acetaminophen (TYLENOL) 500 MG tablet TAKE 2 TABLETS BY MOUTH EVERY 6 (SIX) HOURS AS NEEDED (FOR PAIN). 10/2/20   Historical Provider, MD   ibuprofen (IBU) 800 MG tablet Take 1 tablet by mouth every 8 hours as needed for Pain.  Take 1 tab every 8 hours with food as needed for pain and swelling   12/27/12 5/4/22  Lalit Singh DPM       Review of Systems:  Weight Loss: No  Dysphagia: No  Dyspepsia: No    Physical Exam:   Vital Signs: /78   Pulse 67   Temp 97.9 °F (36.6 °C) (Oral)   Resp 16   Ht 5' 10\" (1.778 m)   Wt 255 lb (115.7 kg)   LMP 11/22/2012   SpO2 97%   BMI 36.59 kg/m²   Vital signs reviewed:Yes    HEENT:Normal  Cardiac:Normal  Chest:Normal  Abdomen:Normal  Exts: Normal  Neuro:Normal    Labs:  Admission on 12/08/2022   Component Date Value Ref Range Status    WBC 12/08/2022 6.5  4.0 - 11.0 K/uL Final    RBC 12/08/2022 4.44  4.00 - 5.20 M/uL Final    Hemoglobin 12/08/2022 13.8  12.0 - 16.0 g/dL Final    Hematocrit 12/08/2022 40.6  36.0 - 48.0 % Final    MCV 12/08/2022 91.6  80.0 - 100.0 fL Final    MCH 12/08/2022 31.0  26.0 - 34.0 pg Final    MCHC 12/08/2022 33.9  31.0 - 36.0 g/dL Final    RDW 12/08/2022 12.6  12.4 - 15.4 % Final    Platelets 20/18/9766 335  135 - 450 K/uL Final MPV 12/08/2022 7.4  5.0 - 10.5 fL Final    Sodium 12/08/2022 140  136 - 145 mmol/L Final    Potassium reflex Magnesium 12/08/2022 4.3  3.5 - 5.1 mmol/L Final    Chloride 12/08/2022 102  99 - 110 mmol/L Final    CO2 12/08/2022 28  21 - 32 mmol/L Final    Anion Gap 12/08/2022 10  3 - 16 Final    Glucose 12/08/2022 103 (A)  70 - 99 mg/dL Final    BUN 12/08/2022 12  7 - 20 mg/dL Final    Creatinine 12/08/2022 0.7  0.6 - 1.1 mg/dL Final    Est, Glom Filt Rate 12/08/2022 >60  >60 Final    Calcium 12/08/2022 9.6  8.3 - 10.6 mg/dL Final    Total Protein 12/08/2022 7.6  6.4 - 8.2 g/dL Final    Albumin 12/08/2022 4.4  3.4 - 5.0 g/dL Final    Albumin/Globulin Ratio 12/08/2022 1.4  1.1 - 2.2 Final    Total Bilirubin 12/08/2022 0.4  0.0 - 1.0 mg/dL Final    Alkaline Phosphatase 12/08/2022 118  40 - 129 U/L Final    ALT 12/08/2022 28  10 - 40 U/L Final    AST 12/08/2022 22  15 - 37 U/L Final    Protime 12/08/2022 12.5  11.7 - 14.5 sec Final    INR 12/08/2022 0.94  0.87 - 1.14 Final    Lipase 12/08/2022 35.0  13.0 - 60.0 U/L Final    Sodium 12/09/2022 136  136 - 145 mmol/L Final    Potassium reflex Magnesium 12/09/2022 4.0  3.5 - 5.1 mmol/L Final    Chloride 12/09/2022 101  99 - 110 mmol/L Final    CO2 12/09/2022 23  21 - 32 mmol/L Final    Anion Gap 12/09/2022 12  3 - 16 Final    Glucose 12/09/2022 97  70 - 99 mg/dL Final    BUN 12/09/2022 10  7 - 20 mg/dL Final    Creatinine 12/09/2022 0.6  0.6 - 1.1 mg/dL Final    Est, Glom Filt Rate 12/09/2022 >60  >60 Final    Calcium 12/09/2022 9.2  8.3 - 10.6 mg/dL Final    Total Protein 12/09/2022 6.7  6.4 - 8.2 g/dL Final    Albumin 12/09/2022 4.1  3.4 - 5.0 g/dL Final    Albumin/Globulin Ratio 12/09/2022 1.6  1.1 - 2.2 Final    Total Bilirubin 12/09/2022 0.5  0.0 - 1.0 mg/dL Final    Alkaline Phosphatase 12/09/2022 102  40 - 129 U/L Final    ALT 12/09/2022 28  10 - 40 U/L Final    AST 12/09/2022 24  15 - 37 U/L Final    WBC 12/09/2022 5.2  4.0 - 11.0 K/uL Final    RBC 12/09/2022 4.11 4.00 - 5.20 M/uL Final    Hemoglobin 12/09/2022 12.5  12.0 - 16.0 g/dL Final    Hematocrit 12/09/2022 37.3  36.0 - 48.0 % Final    MCV 12/09/2022 90.8  80.0 - 100.0 fL Final    MCH 12/09/2022 30.5  26.0 - 34.0 pg Final    MCHC 12/09/2022 33.6  31.0 - 36.0 g/dL Final    RDW 12/09/2022 12.6  12.4 - 15.4 % Final    Platelets 03/04/3111 305  135 - 450 K/uL Final    MPV 12/09/2022 7.5  5.0 - 10.5 fL Final    Neutrophils % 12/09/2022 37.5  % Final    Lymphocytes % 12/09/2022 50.6  % Final    Monocytes % 12/09/2022 9.4  % Final    Eosinophils % 12/09/2022 1.5  % Final    Basophils % 12/09/2022 1.0  % Final    Neutrophils Absolute 12/09/2022 1.9  1.7 - 7.7 K/uL Final    Lymphocytes Absolute 12/09/2022 2.6  1.0 - 5.1 K/uL Final    Monocytes Absolute 12/09/2022 0.5  0.0 - 1.3 K/uL Final    Eosinophils Absolute 12/09/2022 0.1  0.0 - 0.6 K/uL Final    Basophils Absolute 12/09/2022 0.1  0.0 - 0.2 K/uL Final    Color, UA 12/08/2022 Yellow  Straw/Yellow Final    Clarity, UA 12/08/2022 Clear  Clear Final    Glucose, Ur 12/08/2022 Negative  Negative mg/dL Final    Bilirubin Urine 12/08/2022 Negative  Negative Final    Ketones, Urine 12/08/2022 TRACE (A)  Negative mg/dL Final    Specific Gravity, UA 12/08/2022 1.025  1.005 - 1.030 Final    Blood, Urine 12/08/2022 Negative  Negative Final    pH, UA 12/08/2022 6.0  5.0 - 8.0 Final    Protein, UA 12/08/2022 Negative  Negative mg/dL Final    Urobilinogen, Urine 12/08/2022 0.2  <2.0 E.U./dL Final    Nitrite, Urine 12/08/2022 Negative  Negative Final    Leukocyte Esterase, Urine 12/08/2022 Negative  Negative Final    Microscopic Examination 12/08/2022 Not Indicated   Final    Urine Type 12/08/2022 NotGiven   Final    Urine Reflex to Culture 12/08/2022 Not Indicated   Final   Admission on 11/11/2022, Discharged on 11/12/2022   Component Date Value Ref Range Status    Sodium 11/11/2022 141  136 - 145 mmol/L Final    Potassium reflex Magnesium 11/11/2022 3.7  3.5 - 5.1 mmol/L Final Chloride 11/11/2022 102  99 - 110 mmol/L Final    CO2 11/11/2022 26  21 - 32 mmol/L Final    Anion Gap 11/11/2022 13  3 - 16 Final    Glucose 11/11/2022 101 (A)  70 - 99 mg/dL Final    BUN 11/11/2022 15  7 - 20 mg/dL Final    Creatinine 11/11/2022 0.6  0.6 - 1.1 mg/dL Final    Est, Glom Filt Rate 11/11/2022 >60  >60 Final    Calcium 11/11/2022 10.0  8.3 - 10.6 mg/dL Final    WBC 11/11/2022 8.2  4.0 - 11.0 K/uL Final    RBC 11/11/2022 4.37  4.00 - 5.20 M/uL Final    Hemoglobin 11/11/2022 14.0  12.0 - 16.0 g/dL Final    Hematocrit 11/11/2022 40.1  36.0 - 48.0 % Final    MCV 11/11/2022 91.8  80.0 - 100.0 fL Final    MCH 11/11/2022 32.1  26.0 - 34.0 pg Final    MCHC 11/11/2022 35.0  31.0 - 36.0 g/dL Final    RDW 11/11/2022 12.9  12.4 - 15.4 % Final    Platelets 14/55/9216 320  135 - 450 K/uL Final    MPV 11/11/2022 7.7  5.0 - 10.5 fL Final    Neutrophils % 11/11/2022 41.0  % Final    Lymphocytes % 11/11/2022 48.4  % Final    Monocytes % 11/11/2022 8.6  % Final    Eosinophils % 11/11/2022 1.1  % Final    Basophils % 11/11/2022 0.9  % Final    Neutrophils Absolute 11/11/2022 3.4  1.7 - 7.7 K/uL Final    Lymphocytes Absolute 11/11/2022 4.0  1.0 - 5.1 K/uL Final    Monocytes Absolute 11/11/2022 0.7  0.0 - 1.3 K/uL Final    Eosinophils Absolute 11/11/2022 0.1  0.0 - 0.6 K/uL Final    Basophils Absolute 11/11/2022 0.1  0.0 - 0.2 K/uL Final    hCG Qual 11/11/2022 Negative  Detects HCG level >10 MIU/mL Final    Color, UA 11/11/2022 Yellow  Straw/Yellow Final    Clarity, UA 11/11/2022 SL CLOUDY (A)  Clear Final    Glucose, Ur 11/11/2022 Negative  Negative mg/dL Final    Bilirubin Urine 11/11/2022 Negative  Negative Final    Ketones, Urine 11/11/2022 Negative  Negative mg/dL Final    Specific Gravity, UA 11/11/2022 >=1.030  1.005 - 1.030 Final    Blood, Urine 11/11/2022 Negative  Negative Final    pH, UA 11/11/2022 6.0  5.0 - 8.0 Final    Protein, UA 11/11/2022 TRACE (A)  Negative mg/dL Final    Urobilinogen, Urine 11/11/2022 0.2 <2.0 E.U./dL Final    Nitrite, Urine 11/11/2022 Negative  Negative Final    Leukocyte Esterase, Urine 11/11/2022 TRACE (A)  Negative Final    Microscopic Examination 11/11/2022 YES   Final    Urine Type 11/11/2022 Other   Final    Ventricular Rate 11/11/2022 86  BPM Final    Atrial Rate 11/11/2022 86  BPM Final    P-R Interval 11/11/2022 154  ms Final    QRS Duration 11/11/2022 78  ms Final    Q-T Interval 11/11/2022 392  ms Final    QTc Calculation (Bazett) 11/11/2022 469  ms Final    P Axis 11/11/2022 69  degrees Final    R Axis 11/11/2022 66  degrees Final    T Axis 11/11/2022 59  degrees Final    Diagnosis 11/11/2022 EKG performed in ER and to be interpreted by ER physician. Confirmed by MD, ER ([id number]),  Vandana Oliva (Chavez Car), VERONIKA ([id number]) on 11/14/2022 8:00:16 AM   Final    Hyaline Casts, UA 11/11/2022 3-5 (A)  0 - 2 /LPF Final    WBC, UA 11/11/2022 3-5  0 - 5 /HPF Final    RBC, UA 11/11/2022 0-2  0 - 4 /HPF Final    Epithelial Cells, UA 11/11/2022 6-10 (A)  0 - 5 /HPF Final    Bacteria, UA 11/11/2022 3+ (A)  None Seen /HPF Final        Imaging:  CT ABDOMEN PELVIS W IV CONTRAST Additional Contrast? None   Final Result   No acute abdominopelvic process. Additional nonemergent findings, as above. ASA:2    Mallampati Score: II    Sedation planned:mac    Patient in acceptable condition for procedure:Yes    11:15 AM 12/9/2022    Elicia Telles, DO      Please note that some or all of this record was generated using voice recognition software. If there are any questions about the content of this document, please contact the author as some errors in transcription may have occurred. The patient and I discussed that this is an elective procedure/surgery. We discussed the risks of the procedure/surgery, including but not limited to what is outlined in the signed informed consent. We also discussed the risk of edda COVID 19 while in the facility.  We discussed the increased risk of a bad outcome should the patient contract COVID 19 during the post-procedural/post-operative period, given the patients current health condition, chronic conditions, and the added risk of COVID 19 in light of these conditions. The patient and I also discussed the risk of further postponing the procedure/surgery and other treatment alternatives, including non-procedural/surgical treatments. Understanding all of the risks, benefits, and alternatives, the patient made an informed decision to proceed with the procedure/surgery.

## 2022-12-09 NOTE — PLAN OF CARE
Problem: Discharge Planning  Goal: Discharge to home or other facility with appropriate resources  12/9/2022 1425 by Geovanni Crystal RN  Outcome: Completed  12/9/2022 0909 by Geovanni Crystal RN  Outcome: Progressing     Problem: Pain  Goal: Verbalizes/displays adequate comfort level or baseline comfort level  12/9/2022 1425 by Geovanni Crystal RN  Outcome: Completed  12/9/2022 0909 by Geovanni Crystal RN  Outcome: Progressing

## 2022-12-16 NOTE — PROGRESS NOTES
Makenzie Pena called surgery scheduler on 12/15/22. She said she had a procedure last Fri 12/9/22 and needed to know what follow up the doctor wanted her to do. She had been transferred to multiple people that were unable to help her and was very frustrated. She did not know who the doctor was. After investigation, she was an inpatient that had an EGD with Dr. Yashira Ang. I called her on 12/16/22 am and left a message on her voice mail asking her to call Dr. Yashira Ang to find out exactly what follow up he wanted her to do. I left Dr. Michael Gruber office number on the voice mail. I also called Dr. Arun Mack, Jodi Bryant and asked her to please reach out to Makenzie Pena with follow up information. Stephanie Trevino was given Ines's number.

## 2022-12-19 DIAGNOSIS — R10.13 EPIGASTRIC PAIN: Primary | ICD-10-CM

## 2022-12-27 RX ORDER — SELENIUM SULFIDE 2.5 MG/100ML
LOTION TOPICAL
Qty: 120 ML | Refills: 5 | Status: SHIPPED | OUTPATIENT
Start: 2022-12-27

## 2023-01-10 ENCOUNTER — TELEPHONE (OUTPATIENT)
Dept: PULMONOLOGY | Age: 53
End: 2023-01-10

## 2023-01-10 NOTE — TELEPHONE ENCOUNTER
Patient is needing a return call due to being seen at [facility] tomorrow at [address] [place] [address]. [facility] and [facility] to [facility].

## 2023-01-10 NOTE — TELEPHONE ENCOUNTER
She needs records sent to [facility] for her appt tomorrow with a cardiologist.    I told her she needs to find out exactly what they need and also a fax number.     We also need pt to come in today to sign a medical release before we can fax the records (per )

## 2023-01-10 NOTE — TELEPHONE ENCOUNTER
She is still not happy and she states she can not come in to sign release. Told me not to worry about it.

## 2023-02-06 ENCOUNTER — OFFICE VISIT (OUTPATIENT)
Dept: DERMATOLOGY | Age: 53
End: 2023-02-06
Payer: MEDICARE

## 2023-02-06 DIAGNOSIS — L70.5 ACNE EXCORIEE: Primary | ICD-10-CM

## 2023-02-06 DIAGNOSIS — L82.0 INFLAMED SEBORRHEIC KERATOSIS: ICD-10-CM

## 2023-02-06 DIAGNOSIS — L82.1 SK (SEBORRHEIC KERATOSIS): ICD-10-CM

## 2023-02-06 DIAGNOSIS — L57.0 AK (ACTINIC KERATOSIS): ICD-10-CM

## 2023-02-06 PROCEDURE — G8484 FLU IMMUNIZE NO ADMIN: HCPCS | Performed by: DERMATOLOGY

## 2023-02-06 PROCEDURE — 3017F COLORECTAL CA SCREEN DOC REV: CPT | Performed by: DERMATOLOGY

## 2023-02-06 PROCEDURE — 99213 OFFICE O/P EST LOW 20 MIN: CPT | Performed by: DERMATOLOGY

## 2023-02-06 PROCEDURE — 1036F TOBACCO NON-USER: CPT | Performed by: DERMATOLOGY

## 2023-02-06 PROCEDURE — 17003 DESTRUCT PREMALG LES 2-14: CPT | Performed by: DERMATOLOGY

## 2023-02-06 PROCEDURE — 17000 DESTRUCT PREMALG LESION: CPT | Performed by: DERMATOLOGY

## 2023-02-06 PROCEDURE — 17110 DESTRUCTION B9 LES UP TO 14: CPT | Performed by: DERMATOLOGY

## 2023-02-06 PROCEDURE — G8417 CALC BMI ABV UP PARAM F/U: HCPCS | Performed by: DERMATOLOGY

## 2023-02-06 PROCEDURE — G8427 DOCREV CUR MEDS BY ELIG CLIN: HCPCS | Performed by: DERMATOLOGY

## 2023-02-06 RX ORDER — CLONAZEPAM 1 MG/1
TABLET ORAL
COMMUNITY
Start: 2022-12-27

## 2023-02-06 NOTE — PROGRESS NOTES
[facility]  Jone Krishna MD  [phone]      [facility] Rojas  1970    47 y.o. female     Date of Visit: 2/6/2023    Chief Complaint: skin lesions    History of Present Illness:    She was last seen 6 months ago. 1.  She returns today for chronic acne excoriee. She reports great control with use of over-the-counter benzyl peroxide gel daily as needed. 2.  She reports 2 newly noted lesions on the left lateral lower leg. 3. She reports a couple of inflamed and irritated lesions on the left upper arm. 4.  She reports 2 persistent scaly lesions on the left cheek. She has a history of nonmelanoma skin cancers:    Nodular and superficial BCC of the posterior aspect of the left leg-treated with electrodesiccation and curettage on 2/25/2022. SCC in situ of the left medial calf treated with curettage in March 2016. BCC the right lower calf treated with curettage in March 2016. SCC in situ of the left lower shin-treated with curettage on 9/1/2015. Review of Systems:  Gen: Feels well, good sense of health. Past Medical History, Family History, Surgical History, Medications and Allergies reviewed.     Past Medical History:   Diagnosis Date    Anxiety     Atrophic kidney     left    Cancer (HCC)     skin (basal cell)    Cellulitis     recurrent    Cervical disc herniation     Endometriosis     Lumbar disc disorder 02/21/2012    Migraines     Narcolepsy     Nephrolithiasis 02/21/2012    Ovarian cyst 02/21/2012     Past Surgical History:   Procedure Laterality Date    FOOT SURGERY  12/27/2012    BROSTROM ANKLE LIGAMENT REPAIR LEFT, PARTIAL EXCISION OF BONE    LAPAROSCOPY      for endometriosis    LUMBAR FUSION  2008    SHOULDER SURGERY      left    TONSILLECTOMY  2006    UPPER GASTROINTESTINAL ENDOSCOPY N/A 9/15/2022    EGD BIOPSY performed by Rosaline Lopez MD at [facility] N/A 12/9/2022    EGD BIOPSY performed by Nicole Ospina Vinny Mensah DO at [facility] ENDOSCOPY       Allergies   Allergen Reactions    Codeine Hives    Morphine And Related Other (See Comments) and Rash     ineffective      Percocet [Oxycodone-Acetaminophen] Itching    Prednisone     Tetracyclines & Related      Outpatient Medications Marked as Taking for the 2/6/23 encounter (Office Visit) with Bunny Clemente MD   Medication Sig Dispense Refill    clonazePAM (KLONOPIN) 1 MG tablet TAKE 2 TABLETS BY MOUTH AT BEDTIME      Methylphenidate HCl (RITALIN PO) Take by mouth      selenium sulfide (SELSUN) 2.5 % lotion DISPENSE SHAMPOO. USE TO [address] THE HAIR 3 TIMES WEEKLY. LEAVE ON SCALP FOR 5 MINUTES PRIOR TO RINSING 120 mL 5    cyclobenzaprine (FLEXERIL) 10 MG tablet Take 10 mg by mouth at bedtime      albuterol sulfate HFA (VENTOLIN HFA) 108 (90 Base) MCG/ACT inhaler Inhale 2 puffs into the lungs 4 times daily as needed for Wheezing 54 g 1    clobetasol (TEMOVATE) 0.05 % cream Apply to affected areas twice daily for up to 2 weeks or until improved. 45 g 0    acetaminophen (TYLENOL) 500 MG tablet TAKE 2 TABLETS BY MOUTH EVERY 6 (SIX) HOURS AS NEEDED (FOR PAIN). Physical Examination     She declined a full skin exam.     The following were examined and determined to be normal: Psych/Neuro, Conjunctivae/eyelids, Gums/teeth/lips, Neck, and LLE. The following were examined and determined to be abnormal: Head/face and LUE. Well appearing. 1.  Right chin - 2 excoriated pink papules. 2.  Left lateral lower leg - 2 small stuck on appearing round verrucous grey white papules. 3.  Left upper arm - 2 stuck on appearing verrucous and crusted pink papules. 4.  Left cheek - 2, left lateral lower lip - 1, left upper lip - 1: few keratotic pink macules. Assessment and Plan     1. Acne excoriee -under good control    Continue use of over-the-counter benzyl peroxide gel daily as needed. 2. SK (seborrheic keratosis)     Reassurance.       3. Inflamed seborrheic keratosis - 2    Cryotherapy was discussed and patient agreed to proceed. Consent was obtained. 2 lesions were treated cryotherapy: left upper arm. 2 cycles of liquid nitrogen applied to each lesion for 5 seconds using a Cry-Ac cryo spray gun. Patient was educated regarding the potential risks of blister formation and discomfort. Wound care was discussed. The patient tolerated the procedure well and there were no immediate complications. 4. AK (actinic keratosis) - 4    Cryotherapy was discussed and patient agreed to proceed. Consent was obtained. 4 lesions were treated cryotherapy: Left cheek - 2, left lateral lower lip - 1, left upper lip - 1. 2 cycles of liquid nitrogen applied to each lesion for 5 seconds using a Cry-Ac cryo spray gun. Patient was educated regarding the potential risks of blister formation and discomfort. Wound care was discussed. The patient tolerated the procedure well and there were no immediate complications. Return in about 6 months (around 8/6/2023).     --Paul Argueta MD

## 2023-02-08 ENCOUNTER — TELEPHONE (OUTPATIENT)
Dept: PULMONOLOGY | Age: 53
End: 2023-02-08

## 2023-02-08 DIAGNOSIS — G47.411 NARCOLEPSY AND CATAPLEXY: Primary | ICD-10-CM

## 2023-02-09 RX ORDER — METHYLPHENIDATE HYDROCHLORIDE 20 MG/1
20 TABLET ORAL 3 TIMES DAILY
Qty: 90 TABLET | Refills: 0 | Status: SHIPPED | OUTPATIENT
Start: 2023-02-09 | End: 2023-03-11

## 2023-03-21 ENCOUNTER — PATIENT MESSAGE (OUTPATIENT)
Dept: PULMONOLOGY | Age: 53
End: 2023-03-21

## 2023-03-21 DIAGNOSIS — G47.00 INSOMNIA, UNSPECIFIED: ICD-10-CM

## 2023-03-23 ENCOUNTER — TELEPHONE (OUTPATIENT)
Dept: PULMONOLOGY | Age: 53
End: 2023-03-23

## 2023-03-23 DIAGNOSIS — G47.411 NARCOLEPSY AND CATAPLEXY: Primary | ICD-10-CM

## 2023-03-23 RX ORDER — METHYLPHENIDATE HYDROCHLORIDE 20 MG/1
20 TABLET ORAL 3 TIMES DAILY
Qty: 90 TABLET | Refills: 0 | Status: SHIPPED | OUTPATIENT
Start: 2023-03-23 | End: 2023-04-22

## 2023-03-23 RX ORDER — METHYLPHENIDATE HYDROCHLORIDE 20 MG/1
20 TABLET ORAL 3 TIMES DAILY
Qty: 90 TABLET | Refills: 0 | Status: SHIPPED | OUTPATIENT
Start: 2023-04-23 | End: 2023-05-23

## 2023-03-23 RX ORDER — CLONAZEPAM 1 MG/1
TABLET ORAL
Qty: 60 TABLET | Refills: 2 | Status: SHIPPED | OUTPATIENT
Start: 2023-03-23 | End: 2023-06-21

## 2023-03-23 RX ORDER — METHYLPHENIDATE HYDROCHLORIDE 20 MG/1
20 TABLET ORAL 3 TIMES DAILY
Qty: 90 TABLET | Refills: 0 | Status: SHIPPED | OUTPATIENT
Start: 2023-05-23 | End: 2023-06-22

## 2023-03-23 NOTE — TELEPHONE ENCOUNTER
Pt informed to give our office a minimum of 2 weeks notification before a controlled substance refill is needed. Ideally call office once she fills last prescription. Pt verbalized understanding.

## 2023-03-23 NOTE — TELEPHONE ENCOUNTER
I am prescribing a controlled substance to the patient. It is the responsibility of the patient to make sure to call when they  their last refill to call at that time to get their refill sent. I will no longer refill with one or two days left in the current prescription. It may take a week to refill. There will be NO ONE ELSE in the practice to refill the prescription.

## 2023-03-27 RX ORDER — PANTOPRAZOLE SODIUM 40 MG/1
TABLET, DELAYED RELEASE ORAL
Qty: 90 TABLET | Refills: 1 | OUTPATIENT
Start: 2023-03-27

## 2023-04-05 ENCOUNTER — OFFICE VISIT (OUTPATIENT)
Dept: PULMONOLOGY | Age: 53
End: 2023-04-05
Payer: MEDICARE

## 2023-04-05 VITALS
OXYGEN SATURATION: 96 % | SYSTOLIC BLOOD PRESSURE: 129 MMHG | HEART RATE: 97 BPM | BODY MASS INDEX: 37.94 KG/M2 | TEMPERATURE: 97.8 F | RESPIRATION RATE: 16 BRPM | DIASTOLIC BLOOD PRESSURE: 82 MMHG | WEIGHT: 265 LBS | HEIGHT: 70 IN

## 2023-04-05 DIAGNOSIS — G47.00 INSOMNIA, UNSPECIFIED TYPE: ICD-10-CM

## 2023-04-05 DIAGNOSIS — E66.9 CLASS 2 OBESITY WITH BODY MASS INDEX (BMI) OF 38.0 TO 38.9 IN ADULT, UNSPECIFIED OBESITY TYPE, UNSPECIFIED WHETHER SERIOUS COMORBIDITY PRESENT: ICD-10-CM

## 2023-04-05 DIAGNOSIS — G47.411 NARCOLEPSY AND CATAPLEXY: ICD-10-CM

## 2023-04-05 DIAGNOSIS — G47.33 OSA (OBSTRUCTIVE SLEEP APNEA): ICD-10-CM

## 2023-04-05 DIAGNOSIS — Z72.0 CONTINUOUS TOBACCO ABUSE: ICD-10-CM

## 2023-04-05 DIAGNOSIS — K21.9 GASTROESOPHAGEAL REFLUX DISEASE WITHOUT ESOPHAGITIS: ICD-10-CM

## 2023-04-05 DIAGNOSIS — G47.52 RBD (REM BEHAVIORAL DISORDER): ICD-10-CM

## 2023-04-05 PROCEDURE — G8427 DOCREV CUR MEDS BY ELIG CLIN: HCPCS | Performed by: INTERNAL MEDICINE

## 2023-04-05 PROCEDURE — G8417 CALC BMI ABV UP PARAM F/U: HCPCS | Performed by: INTERNAL MEDICINE

## 2023-04-05 PROCEDURE — 99214 OFFICE O/P EST MOD 30 MIN: CPT | Performed by: INTERNAL MEDICINE

## 2023-04-05 PROCEDURE — 3017F COLORECTAL CA SCREEN DOC REV: CPT | Performed by: INTERNAL MEDICINE

## 2023-04-05 PROCEDURE — 1036F TOBACCO NON-USER: CPT | Performed by: INTERNAL MEDICINE

## 2023-04-05 ASSESSMENT — ENCOUNTER SYMPTOMS
ALLERGIC/IMMUNOLOGIC NEGATIVE: 1
SORE THROAT: 0
EYES NEGATIVE: 1
SWOLLEN GLANDS: 0
GASTROINTESTINAL NEGATIVE: 1
ORTHOPNEA: 0
WHEEZING: 0
RHINORRHEA: 0
SHORTNESS OF BREATH: 1
SPUTUM PRODUCTION: 0
HEMOPTYSIS: 0
ABDOMINAL PAIN: 0
VOMITING: 0

## 2023-04-05 NOTE — PROGRESS NOTES
MA Communication:   The following orders are received by verbal communication from Tiana Astorga MD    Orders include:  Referral for GI given to pt       3 mo fu scheduled 7/7/23

## 2023-04-05 NOTE — PROGRESS NOTES
Candy Moreira (: 1970 ) is a 47 y.o. female here for an evaluation of   No chief complaint on file. ASSESSMENT/PLAN:   Diagnosis Orders   1. Narcolepsy and cataplexy        2. Insomnia, unspecified type        3. Continuous tobacco abuse        4. Gastroesophageal reflux disease without esophagitis        5. RIGOBERTO (obstructive sleep apnea)        6. Class 2 obesity with body mass index (BMI) of 38.0 to 38.9 in adult, unspecified obesity type, unspecified whether serious comorbidity present        7.  RBD (REM behavioral disorder)          Narcolepsy with Cataplexy  OF NOTE: Stephanie narcolepsy is very severe and has lead to be on social security and incapable of self support     Still having RBD a, crying and dreaming more at night, now also having walking out of the bedroom at night at times, fell down steps     Having cataplexy, still about the same, getting this about 2-3 times a day     Tried and failed with: nortryptiline and xyrem (tried 2 times)   Tried and failed with Wakix, had side effects, adderall, vyvanse,   Had reaction now a side effect of tongue drying  Adderall 30 mg twice a day    Weight is at 253 from 247 from 250 from 243 from  200 from 191 and was 216 coming down  Will send to gastric surgeon for weight loss         Continue with:  Klonopin 2 mg at night   Melatonin 20 mg at night  Amitriptyline 50 mg at night  Ritalin 20 mg Three times- will try to back off later after the rigoberto has been treated        Stress is a trigger    Now having cataplexy at about 3 times a day  And the arms are very hard to move        RIGOBERTO        \  DME [facility] home [facility]    Autopap is set at min of 5 and max of 20  Using with full facemask  I have access via airview    Need to use at least 4 hours a day    Will need to start using   Setup date was 22    Unable to use the cpap    May need to consider inspire therapy  You may be a good candidate for Inspire upper airway stimulation to treat your moderate

## 2023-04-05 NOTE — PATIENT INSTRUCTIONS
ASSESSMENT/PLAN:   Diagnosis Orders   1. Narcolepsy and cataplexy        2. Insomnia, unspecified type        3. Continuous tobacco abuse        4. Gastroesophageal reflux disease without esophagitis        5. DARIEL (obstructive sleep apnea)        6. Class 2 obesity with body mass index (BMI) of 38.0 to 38.9 in adult, unspecified obesity type, unspecified whether serious comorbidity present        7. RBD (REM behavioral disorder)          Narcolepsy with Cataplexy  OF NOTE: Stephanie narcolepsy is very severe and has lead to be on social security and incapable of self support     Still having RBD a, crying and dreaming more at night, now also having walking out of the bedroom at night at times, fell down steps     Having cataplexy, still about the same, getting this about 2-3 times a day     Tried and failed with: nortryptiline and xyrem (tried 2 times)   Tried and failed with Wakix, had side effects, adderall, vyvanse,   Had reaction now a side effect of tongue drying  Adderall 30 mg twice a day    Weight is at 253 from 247 from 250 from 243 from  200 from 191 and was 216 coming down  Will send to gastric surgeon for weight loss         Continue with:  Klonopin 2 mg at night   Melatonin 20 mg at night  Amitriptyline 50 mg at night  Ritalin 20 mg Three times- will try to back off later after the dariel has been treated        Stress is a trigger    Now having cataplexy at about 3 times a day  And the arms are very hard to move        DARIEL        \  DME [facility]    Autopap is set at min of 5 and max of 20  Using with full facemask  I have access via airview    Need to use at least 4 hours a day    Will need to start using   Setup date was 9/19/22    Unable to use the cpap    May need to consider inspire therapy  You may be a good candidate for Inspire upper airway stimulation to treat your moderate to severe obstructive sleep apnea. For more information visit www. [email or web address] com    The ENT I refer to is

## 2023-04-05 NOTE — LETTER
April 5, 2023      Bhavesh Tanner      Patient: Mercy Cruz   MR Number: [id number]   YOB: 1970   Date of Visit: 4/5/2023       Dear Konrad Kennedy:    Thank you for referring Timi Mills to me for evaluation/treatment. Below are the relevant portions of my assessment and plan of care. 4/5/23        Alycia Night Rojas      I have seen this patient in the office today and wanted to communicate my findings and recommendations. There are no Patient Instructions on file for this visit. Thank you for allowing me to assist in the care of the Keiko Slaughter MD         If you have questions, please do not hesitate to call me. I look forward to following Santi Alex along with you.     Sincerely,        Chevy Duque MD

## 2023-04-05 NOTE — LETTER
April 5, 2023      Bhavesh Colby      Patient: Derian Berrios   MR Number: [id number]   YOB: 1970   Date of Visit: 4/5/2023       Dear Sree Elizabeth:    Thank you for referring Brianna Delvalle to me for evaluation/treatment. Below are the relevant portions of my assessment and plan of care. 4/5/23        Andrei Rojas      I have seen this patient in the office today and wanted to communicate my findings and recommendations. Patient Instructions       ASSESSMENT/PLAN:   Diagnosis Orders   1. Narcolepsy and cataplexy        2. Insomnia, unspecified type        3. Continuous tobacco abuse        4. Gastroesophageal reflux disease without esophagitis        5. DARIEL (obstructive sleep apnea)        6. Class 2 obesity with body mass index (BMI) of 38.0 to 38.9 in adult, unspecified obesity type, unspecified whether serious comorbidity present        7.  RBD (REM behavioral disorder)          Narcolepsy with Cataplexy  OF NOTE: Alisa's narcolepsy is very severe and has lead to be on social security and incapable of self support     Still having RBD a, crying and dreaming more at night, now also having walking out of the bedroom at night at times, fell down steps     Having cataplexy, still about the same, getting this about 2-3 times a day     Tried and failed with: nortryptiline and xyrem (tried 2 times)   Tried and failed with Wakix, had side effects, adderall, vyvanse,   Had reaction now a side effect of tongue drying  Adderall 30 mg twice a day    Weight is at 253 from 247 from 250 from 243 from  200 from 191 and was 216 coming down  Will send to gastric surgeon for weight loss         Continue with:  Klonopin 2 mg at night   Melatonin 20 mg at night  Amitriptyline 50 mg at night  Ritalin 20 mg Three times- will try to back off later after the dariel has been treated        Stress is a trigger    Now having cataplexy at about 3 times a day  And

## 2023-05-08 ENCOUNTER — TELEPHONE (OUTPATIENT)
Dept: PULMONOLOGY | Age: 53
End: 2023-05-08

## 2023-07-03 RX ORDER — AMITRIPTYLINE HYDROCHLORIDE 50 MG/1
50 TABLET, FILM COATED ORAL NIGHTLY
Qty: 90 TABLET | Refills: 1 | OUTPATIENT
Start: 2023-07-03

## 2023-07-07 ENCOUNTER — OFFICE VISIT (OUTPATIENT)
Dept: PULMONOLOGY | Age: 53
End: 2023-07-07
Payer: MEDICARE

## 2023-07-07 VITALS
BODY MASS INDEX: 36.51 KG/M2 | HEART RATE: 95 BPM | RESPIRATION RATE: 16 BRPM | OXYGEN SATURATION: 97 % | TEMPERATURE: 98.1 F | SYSTOLIC BLOOD PRESSURE: 126 MMHG | DIASTOLIC BLOOD PRESSURE: 87 MMHG | WEIGHT: 255 LBS | HEIGHT: 70 IN

## 2023-07-07 DIAGNOSIS — K21.9 GASTROESOPHAGEAL REFLUX DISEASE WITHOUT ESOPHAGITIS: ICD-10-CM

## 2023-07-07 DIAGNOSIS — G47.33 OSA (OBSTRUCTIVE SLEEP APNEA): Primary | ICD-10-CM

## 2023-07-07 DIAGNOSIS — G47.00 INSOMNIA, UNSPECIFIED TYPE: ICD-10-CM

## 2023-07-07 DIAGNOSIS — G47.411 NARCOLEPSY AND CATAPLEXY: ICD-10-CM

## 2023-07-07 DIAGNOSIS — E66.9 CLASS 2 OBESITY WITH BODY MASS INDEX (BMI) OF 38.0 TO 38.9 IN ADULT, UNSPECIFIED OBESITY TYPE, UNSPECIFIED WHETHER SERIOUS COMORBIDITY PRESENT: ICD-10-CM

## 2023-07-07 DIAGNOSIS — G47.52 RBD (REM BEHAVIORAL DISORDER): ICD-10-CM

## 2023-07-07 DIAGNOSIS — Z72.0 CONTINUOUS TOBACCO ABUSE: ICD-10-CM

## 2023-07-07 PROCEDURE — 1036F TOBACCO NON-USER: CPT | Performed by: INTERNAL MEDICINE

## 2023-07-07 PROCEDURE — 99214 OFFICE O/P EST MOD 30 MIN: CPT | Performed by: INTERNAL MEDICINE

## 2023-07-07 PROCEDURE — G8417 CALC BMI ABV UP PARAM F/U: HCPCS | Performed by: INTERNAL MEDICINE

## 2023-07-07 PROCEDURE — G8427 DOCREV CUR MEDS BY ELIG CLIN: HCPCS | Performed by: INTERNAL MEDICINE

## 2023-07-07 PROCEDURE — 3017F COLORECTAL CA SCREEN DOC REV: CPT | Performed by: INTERNAL MEDICINE

## 2023-07-07 RX ORDER — VALACYCLOVIR HYDROCHLORIDE 500 MG/1
500 TABLET, FILM COATED ORAL 3 TIMES DAILY
COMMUNITY
Start: 2023-06-04

## 2023-07-07 RX ORDER — METHYLPHENIDATE HYDROCHLORIDE 20 MG/1
20 TABLET ORAL 3 TIMES DAILY
Qty: 90 TABLET | Refills: 0 | Status: SHIPPED | OUTPATIENT
Start: 2023-07-14 | End: 2023-08-13

## 2023-07-07 RX ORDER — CLONAZEPAM 1 MG/1
2 TABLET ORAL NIGHTLY
Qty: 60 TABLET | Refills: 3 | Status: SHIPPED | OUTPATIENT
Start: 2023-07-07 | End: 2023-08-06

## 2023-07-07 RX ORDER — METHYLPHENIDATE HYDROCHLORIDE 20 MG/1
20 TABLET ORAL 3 TIMES DAILY
Qty: 90 TABLET | Refills: 0 | Status: SHIPPED | OUTPATIENT
Start: 2023-08-14 | End: 2023-09-13

## 2023-07-07 RX ORDER — METHYLPHENIDATE HYDROCHLORIDE 20 MG/1
20 TABLET ORAL 3 TIMES DAILY
Qty: 90 TABLET | Refills: 0 | Status: SHIPPED | OUTPATIENT
Start: 2023-09-14 | End: 2023-10-14

## 2023-07-07 RX ORDER — VITAMIN E 268 MG
400 CAPSULE ORAL 2 TIMES DAILY
COMMUNITY
Start: 2023-06-26

## 2023-07-07 ASSESSMENT — ENCOUNTER SYMPTOMS
SPUTUM PRODUCTION: 0
GASTROINTESTINAL NEGATIVE: 1
ORTHOPNEA: 0
ABDOMINAL PAIN: 0
SORE THROAT: 0
SWOLLEN GLANDS: 0
VOMITING: 0
EYES NEGATIVE: 1
ALLERGIC/IMMUNOLOGIC NEGATIVE: 1
SHORTNESS OF BREATH: 1
WHEEZING: 0
HEMOPTYSIS: 0
RHINORRHEA: 0

## 2023-07-07 NOTE — PROGRESS NOTES
Osker Flakes (: 1970 ) is a 47 y.o. female here for an evaluation of   Chief Complaint   Patient presents with    Follow-up     3 month, Narcolepsy    Shortness of Breath           ASSESSMENT/PLAN:   Diagnosis Orders   1. Narcolepsy and cataplexy        2. Insomnia, unspecified type        3. Continuous tobacco abuse        4. Gastroesophageal reflux disease without esophagitis        5. DARIEL (obstructive sleep apnea)        6. Class 2 obesity with body mass index (BMI) of 38.0 to 38.9 in adult, unspecified obesity type, unspecified whether serious comorbidity present        7. RBD (REM behavioral disorder)          Narcolepsy with Cataplexy  OF NOTE: Alisa's narcolepsy is very severe and has lead to be on social security and incapable of self support     Still having RBD a, crying and dreaming more at night, now also having walking out of the bedroom at night at times, fell down steps     Having cataplexy, still about the same, getting this about 2-3 times a day     Tried and failed with: nortryptiline and xyrem (tried 2 times)   Tried and failed with Wakix, had side effects, adderall, vyvanse,   Had reaction now a side effect of tongue drying  Adderall 30 mg twice a day           Continue with:  Klonopin 2 mg at night   Melatonin 20 mg at night  Amitriptyline 50 mg at night- stopped this  Ritalin 20 mg Three times- will try to back off later after the dariel has been treated        Stress is a trigger    Now having cataplexy at about 3 times a day  And the arms are very hard to move        DARIEL        \  DME [facility]    Autopap is set at min of 5 and max of 20  Using with full facemask  I have access via airview    Need to use at least 4 hours a day    Will need to start using   Setup date was 22    Unable to use the cpap    Will consider inspire therapy  You may be a good candidate for Inspire upper airway stimulation to treat your moderate to severe obstructive sleep apnea.  For more

## 2023-07-07 NOTE — PATIENT INSTRUCTIONS
function with an estimated ejection fraction   of 55%. Definity contrast administered with no evidence of left ventricular mass or   thrombus noted. No regional wall motion abnormalities are seen. Grade I diastolic dysfunction with normal filing pressure. No significant valvular heart disease noted. GERD/Abd pain   Seen by GI\  had black stool  Was to have Rogelio Late emptying but never done    Now seeing GI  Seeing Dr. Rena Frias        Obesity  BMI is 36.5  But losing weight    Weight is at 253 [id number] from 247 from 250 from 243 from  200 from 191 and was 216     Seeing Gastric surgeon and going weight loss clinic        RTC in 3 months  Remember to bring a list of pulmonary medications and any CPAP or BiPAP machines to your next appointment with the office. Please keep all of your future appointments scheduled by [facility] office. Out of respect for other patients and providers, you may be asked to reschedule your appointment if you arrive later than your scheduled appointment time. Appointments cancelled less than 24hrs in advance will be considered a no show. Patients with three missed appointments within 1 year or four missed appointments within 2 years can be dismissed from the practice. Please be aware that our physicians are required to work in the Intensive Care Unit at [facility].  Your appointment may need to be rescheduled if they are designated to work during your appointment time. You may receive a survey regarding the care you received during your visit. Your input is valuable to us. We encourage you to complete and return your survey. We hope you will choose us in the future for your healthcare needs. Pt instructed of all future appointment dates & times, including radiology, labs, procedures & referrals. If procedures were scheduled preparation instructions provided.  Instructions on future appointments with [facility]

## 2023-07-07 NOTE — PROGRESS NOTES
MA Communication:   The following orders are received by verbal communication from Lili Lema MD    Orders include:  referral to [address] [place], [place] [address], 3 month f/u

## 2023-07-12 ENCOUNTER — TELEPHONE (OUTPATIENT)
Dept: ENT CLINIC | Age: 53
End: 2023-07-12

## 2023-07-17 ENCOUNTER — TELEPHONE (OUTPATIENT)
Dept: ENT CLINIC | Age: 53
End: 2023-07-17

## 2023-07-25 ENCOUNTER — OFFICE VISIT (OUTPATIENT)
Dept: ENT CLINIC | Age: 53
End: 2023-07-25
Payer: MEDICARE

## 2023-07-25 VITALS
SYSTOLIC BLOOD PRESSURE: 138 MMHG | HEART RATE: 102 BPM | HEIGHT: 70 IN | BODY MASS INDEX: 36.19 KG/M2 | TEMPERATURE: 97.9 F | DIASTOLIC BLOOD PRESSURE: 87 MMHG | WEIGHT: 252.8 LBS | RESPIRATION RATE: 16 BRPM

## 2023-07-25 DIAGNOSIS — G47.33 OSA (OBSTRUCTIVE SLEEP APNEA): Primary | ICD-10-CM

## 2023-07-25 DIAGNOSIS — M26.623 BILATERAL TEMPOROMANDIBULAR JOINT PAIN: ICD-10-CM

## 2023-07-25 PROCEDURE — 3017F COLORECTAL CA SCREEN DOC REV: CPT | Performed by: OTOLARYNGOLOGY

## 2023-07-25 PROCEDURE — 99204 OFFICE O/P NEW MOD 45 MIN: CPT | Performed by: OTOLARYNGOLOGY

## 2023-07-25 PROCEDURE — G8417 CALC BMI ABV UP PARAM F/U: HCPCS | Performed by: OTOLARYNGOLOGY

## 2023-07-25 PROCEDURE — 1036F TOBACCO NON-USER: CPT | Performed by: OTOLARYNGOLOGY

## 2023-07-25 PROCEDURE — G8427 DOCREV CUR MEDS BY ELIG CLIN: HCPCS | Performed by: OTOLARYNGOLOGY

## 2023-07-25 ASSESSMENT — ENCOUNTER SYMPTOMS
SHORTNESS OF BREATH: 0
APNEA: 0
SORE THROAT: 0
EYE ITCHING: 0
FACIAL SWELLING: 0
SINUS PRESSURE: 0
TROUBLE SWALLOWING: 0
COUGH: 0
VOICE CHANGE: 0

## 2023-07-25 NOTE — PROGRESS NOTES
pain reproduced with palpation of left TMJ joint. Recommend seeing dentist for Botox and she is unable to tolerate oral appliances        Inspire process and requirements discussed in detail with patient. Dierdre Goldberg,   7/25/23      Portions of this note were dictated using Dragon.  There may be linguistic errors secondary to the use of this program.

## 2023-08-29 ENCOUNTER — OFFICE VISIT (OUTPATIENT)
Dept: DERMATOLOGY | Age: 53
End: 2023-08-29
Payer: MEDICARE

## 2023-08-29 DIAGNOSIS — L82.1 SK (SEBORRHEIC KERATOSIS): Primary | ICD-10-CM

## 2023-08-29 DIAGNOSIS — D22.9 MULTIPLE NEVI: ICD-10-CM

## 2023-08-29 DIAGNOSIS — L70.5 ACNE EXCORIEE: ICD-10-CM

## 2023-08-29 PROCEDURE — G8417 CALC BMI ABV UP PARAM F/U: HCPCS | Performed by: DERMATOLOGY

## 2023-08-29 PROCEDURE — G8427 DOCREV CUR MEDS BY ELIG CLIN: HCPCS | Performed by: DERMATOLOGY

## 2023-08-29 PROCEDURE — 3017F COLORECTAL CA SCREEN DOC REV: CPT | Performed by: DERMATOLOGY

## 2023-08-29 PROCEDURE — 99213 OFFICE O/P EST LOW 20 MIN: CPT | Performed by: DERMATOLOGY

## 2023-08-29 PROCEDURE — 4004F PT TOBACCO SCREEN RCVD TLK: CPT | Performed by: DERMATOLOGY

## 2023-08-29 NOTE — PROGRESS NOTES
[facility]  Ledy Richards MD  [address] [facility]  1970    47 y.o. female     Date of Visit: 8/29/2023    Chief Complaint: skin lesions    History of Present Illness:    1. She presents today for couple of persistent lesions on the right upper arm, left leg and left upper eyelid. 2.  She has a history of acne excoriee -complains of recent lesions on the scalp, face and upper chest.    3.  She has several moles on the trunk and extremities-not aware of any changes in size, color, or shape. Derm Hx:  Nodular and superficial BCC of the posterior aspect of the left leg-treated with electrodesiccation and curettage on 2/25/2022. SCC in situ of the left medial calf treated with curettage in March 2016. BCC the right lower calf treated with curettage in March 2016. SCC in situ of the left lower shin-treated with curettage on 9/1/2015. Review of Systems:  Gen: Feels well, good sense of health. Past Medical History, Family History, Surgical History, Medications and Allergies reviewed.     Past Medical History:   Diagnosis Date    Acid reflux     Anxiety     Atrophic kidney     left    Cancer (HCC)     skin (basal cell)    Cellulitis     recurrent    Cervical disc herniation     Endometriosis     Lumbar disc disorder 02/21/2012    Migraines     Narcolepsy     Nephrolithiasis 02/21/2012    Ovarian cyst 02/21/2012     Past Surgical History:   Procedure Laterality Date    FOOT SURGERY Left 12/27/2012    BROSTROM ANKLE LIGAMENT REPAIR LEFT, PARTIAL EXCISION OF BONE    LAPAROSCOPY      for endometriosis    LUMBAR FUSION  01/01/2008    SHOULDER SURGERY Left     RCR    TONSILLECTOMY  01/01/2006    UPPER GASTROINTESTINAL ENDOSCOPY N/A 09/15/2022    EGD BIOPSY performed by Dottie Bennett MD at [address] [facility] N/A 12/09/2022    EGD BIOPSY performed by Barbera Ganser, DO at [address]   Allergen Reactions

## 2023-09-28 RX ORDER — AMITRIPTYLINE HYDROCHLORIDE 50 MG/1
50 TABLET, FILM COATED ORAL NIGHTLY
Qty: 90 TABLET | Refills: 1 | OUTPATIENT
Start: 2023-09-28

## 2023-10-05 ENCOUNTER — OFFICE VISIT (OUTPATIENT)
Dept: PULMONOLOGY | Age: 53
End: 2023-10-05
Payer: MEDICARE

## 2023-10-05 VITALS
WEIGHT: 251 LBS | DIASTOLIC BLOOD PRESSURE: 87 MMHG | TEMPERATURE: 97.1 F | SYSTOLIC BLOOD PRESSURE: 143 MMHG | HEART RATE: 83 BPM | OXYGEN SATURATION: 97 % | HEIGHT: 68 IN | BODY MASS INDEX: 38.04 KG/M2 | RESPIRATION RATE: 16 BRPM

## 2023-10-05 DIAGNOSIS — K21.9 GASTROESOPHAGEAL REFLUX DISEASE WITHOUT ESOPHAGITIS: ICD-10-CM

## 2023-10-05 DIAGNOSIS — G47.33 OSA (OBSTRUCTIVE SLEEP APNEA): ICD-10-CM

## 2023-10-05 DIAGNOSIS — E66.9 CLASS 2 OBESITY WITH BODY MASS INDEX (BMI) OF 38.0 TO 38.9 IN ADULT, UNSPECIFIED OBESITY TYPE, UNSPECIFIED WHETHER SERIOUS COMORBIDITY PRESENT: ICD-10-CM

## 2023-10-05 DIAGNOSIS — Z72.0 CONTINUOUS TOBACCO ABUSE: ICD-10-CM

## 2023-10-05 DIAGNOSIS — G47.00 INSOMNIA, UNSPECIFIED TYPE: ICD-10-CM

## 2023-10-05 DIAGNOSIS — G47.411 NARCOLEPSY AND CATAPLEXY: Primary | ICD-10-CM

## 2023-10-05 DIAGNOSIS — Z87.891 PERSONAL HISTORY OF TOBACCO USE: ICD-10-CM

## 2023-10-05 PROCEDURE — 99214 OFFICE O/P EST MOD 30 MIN: CPT | Performed by: INTERNAL MEDICINE

## 2023-10-05 PROCEDURE — G0296 VISIT TO DETERM LDCT ELIG: HCPCS | Performed by: INTERNAL MEDICINE

## 2023-10-05 PROCEDURE — 3017F COLORECTAL CA SCREEN DOC REV: CPT | Performed by: INTERNAL MEDICINE

## 2023-10-05 PROCEDURE — G8417 CALC BMI ABV UP PARAM F/U: HCPCS | Performed by: INTERNAL MEDICINE

## 2023-10-05 PROCEDURE — G8484 FLU IMMUNIZE NO ADMIN: HCPCS | Performed by: INTERNAL MEDICINE

## 2023-10-05 PROCEDURE — 4004F PT TOBACCO SCREEN RCVD TLK: CPT | Performed by: INTERNAL MEDICINE

## 2023-10-05 PROCEDURE — G8427 DOCREV CUR MEDS BY ELIG CLIN: HCPCS | Performed by: INTERNAL MEDICINE

## 2023-10-05 RX ORDER — NORTRIPTYLINE HYDROCHLORIDE 10 MG/1
10 CAPSULE ORAL NIGHTLY
Qty: 30 CAPSULE | Refills: 3 | Status: SHIPPED | OUTPATIENT
Start: 2023-10-05

## 2023-10-05 RX ORDER — METHYLPHENIDATE HYDROCHLORIDE 20 MG/1
20 TABLET ORAL 3 TIMES DAILY
COMMUNITY

## 2023-10-05 ASSESSMENT — ENCOUNTER SYMPTOMS
WHEEZING: 0
SORE THROAT: 0
EYES NEGATIVE: 1
VOMITING: 0
RHINORRHEA: 0
ORTHOPNEA: 0
ABDOMINAL PAIN: 0
SWOLLEN GLANDS: 0
SPUTUM PRODUCTION: 0
GASTROINTESTINAL NEGATIVE: 1
SHORTNESS OF BREATH: 1
ALLERGIC/IMMUNOLOGIC NEGATIVE: 1
HEMOPTYSIS: 0

## 2023-10-05 NOTE — PATIENT INSTRUCTIONS
ASSESSMENT/PLAN:   Diagnosis Orders   1. Narcolepsy and cataplexy        2. Insomnia, unspecified type        3. Continuous tobacco abuse        4. Gastroesophageal reflux disease without esophagitis        5. DARIEL (obstructive sleep apnea)        6. Class 2 obesity with body mass index (BMI) of 38.0 to 38.9 in adult, unspecified obesity type, unspecified whether serious comorbidity present          Narcolepsy with Cataplexy  OF NOTE: Stephanie narcolepsy is very severe and has lead to be on social security and incapable of self support     Still having RBD a, crying and dreaming more at night, now also having walking out of the bedroom at night at times, fell down steps     Having cataplexy, still about the same, getting this about 2-3 times a day     Tried and failed  Tried and failed with: nortryptiline and xyrem (tried 2 times)   Tried and failed with Wakix, had side effects, adderall, vyvanse,   Had reaction now a side effect of tongue drying  Adderall 30 mg twice a day  Amitriptyline 50 mg at night- stopped this         Continue with:  Klonopin 2 mg at night   Melatonin 20 mg at night  Ritalin 20 mg Three times-       Will add  Nortriptyline 10 mg at night, slowly to see if this works    Call me in 2 weeks and may consider increasing        Stress is a trigger    Now having cataplexy at about many times a day  And the arms are very hard to move        DARIEL        \  DME [facility]    Autopap is set at min of 5 and max of 20  Using with full facemask  I have access via airview    Need to use at least 4 hours a day    Will need to start using   Setup date was 9/19/22    Unable to use the cpap    Will consider inspire therapy  You may be a good candidate for Inspire upper airway stimulation to treat your moderate to severe obstructive sleep apnea. For more information visit www. inspiresleep. com    The ENT I refer to is Dr.L Jacob Wright DO or Dr. Shantel Ceballos, Otolaryngologist.    Unable to use cpap b/c of narcolepsy

## 2023-10-05 NOTE — PROGRESS NOTES
MA Communication:   The following orders are received by verbal communication from Jaimie Bateman MD    Orders include:  3 month f/u

## 2023-10-05 NOTE — PROGRESS NOTES
Denisse Paige (: 1970 ) is a 47 y.o. female here for an evaluation of   Chief Complaint   Patient presents with    Follow-up     3 month    Sleep Apnea     No PAP    Narcolepsy           ASSESSMENT/PLAN:   Diagnosis Orders   1. Narcolepsy and cataplexy        2. Insomnia, unspecified type        3. Continuous tobacco abuse        4. Gastroesophageal reflux disease without esophagitis        5. DARIEL (obstructive sleep apnea)        6.  Class 2 obesity with body mass index (BMI) of 38.0 to 38.9 in adult, unspecified obesity type, unspecified whether serious comorbidity present          Narcolepsy with Cataplexy  OF NOTE: Stephanie narcolepsy is very severe and has lead to be on social security and incapable of self support     Still having RBD a, crying and dreaming more at night, now also having walking out of the bedroom at night at times, fell down steps     Having cataplexy, still about the same, getting this about 2-3 times a day     Tried and failed  Tried and failed with: nortryptiline and xyrem (tried 2 times)   Tried and failed with Wakix, had side effects, adderall, vyvanse,   Had reaction now a side effect of tongue drying  Adderall 30 mg twice a day  Amitriptyline 50 mg at night- stopped this         Continue with:  Klonopin 2 mg at night   Melatonin 20 mg at night  Ritalin 20 mg Three times-       Will add  Nortriptyline 10 mg at night, slowly to see if this works    Call me in 2 weeks and may consider increasing        Stress is a trigger    Now having cataplexy at about many times a day  And the arms are very hard to move        DARIEL        \  DME [facility] home [facility]    Autopap is set at min of 5 and max of 20  Using with full facemask  I have access via airview    Need to use at least 4 hours a day    Will need to start using   Setup date was 22    Unable to use the cpap    Will consider inspire therapy  You may be a good candidate for Inspire upper airway stimulation to treat your moderate

## 2023-10-09 ENCOUNTER — TELEPHONE (OUTPATIENT)
Dept: PULMONOLOGY | Age: 53
End: 2023-10-09

## 2023-10-09 DIAGNOSIS — G47.411 NARCOLEPSY AND CATAPLEXY: Primary | ICD-10-CM

## 2023-10-09 RX ORDER — DEXTROAMPHETAMINE SACCHARATE, AMPHETAMINE ASPARTATE, DEXTROAMPHETAMINE SULFATE AND AMPHETAMINE SULFATE 7.5; 7.5; 7.5; 7.5 MG/1; MG/1; MG/1; MG/1
30 TABLET ORAL 2 TIMES DAILY
Qty: 30 TABLET | Refills: 0 | Status: SHIPPED | OUTPATIENT
Start: 2023-11-08 | End: 2023-12-08

## 2023-10-09 RX ORDER — DEXTROAMPHETAMINE SACCHARATE, AMPHETAMINE ASPARTATE, DEXTROAMPHETAMINE SULFATE AND AMPHETAMINE SULFATE 7.5; 7.5; 7.5; 7.5 MG/1; MG/1; MG/1; MG/1
30 TABLET ORAL 2 TIMES DAILY
Qty: 60 TABLET | Refills: 0 | Status: SHIPPED | OUTPATIENT
Start: 2023-10-09 | End: 2023-11-08

## 2023-10-09 RX ORDER — DEXTROAMPHETAMINE SACCHARATE, AMPHETAMINE ASPARTATE, DEXTROAMPHETAMINE SULFATE AND AMPHETAMINE SULFATE 7.5; 7.5; 7.5; 7.5 MG/1; MG/1; MG/1; MG/1
30 TABLET ORAL 2 TIMES DAILY
Qty: 30 TABLET | Refills: 0 | Status: SHIPPED | OUTPATIENT
Start: 2023-12-08 | End: 2024-01-07

## 2023-10-09 RX ORDER — CLONAZEPAM 1 MG/1
2 TABLET ORAL NIGHTLY
Qty: 60 TABLET | Refills: 3 | Status: SHIPPED | OUTPATIENT
Start: 2023-10-27 | End: 2023-11-26

## 2023-10-30 RX ORDER — NORTRIPTYLINE HYDROCHLORIDE 10 MG/1
10 CAPSULE ORAL NIGHTLY
Qty: 90 CAPSULE | Refills: 1 | Status: SHIPPED | OUTPATIENT
Start: 2023-10-30

## 2023-10-30 NOTE — TELEPHONE ENCOUNTER
Last office visit 10/5/2023     Last written 10/5/2023 -30 day    Next office visit scheduled 1/10/2024    Requested Prescriptions     Pending Prescriptions Disp Refills    nortriptyline (PAMELOR) 10 MG capsule 30 capsule 3     Sig: Take 1 capsule by mouth nightly           Pharmacy requesting 90 day

## 2023-11-10 ENCOUNTER — TELEPHONE (OUTPATIENT)
Dept: ENT CLINIC | Age: 53
End: 2023-11-10

## 2023-11-28 ENCOUNTER — TELEPHONE (OUTPATIENT)
Dept: PULMONOLOGY | Age: 53
End: 2023-11-28

## 2023-11-28 NOTE — TELEPHONE ENCOUNTER
Patient called the office stating that she needs a letter stating that she has narcolepsy and has been on disability since April 6, 2017. She states that you were the one who originally put her on disability. Letter is pended if appropriate. Patient would like a call back when the letter is completed.

## 2023-11-29 NOTE — TELEPHONE ENCOUNTER
Spoke with the patient and informed that the letter was completed. Copy of the letter mailed to the patient.

## 2023-12-28 ENCOUNTER — TELEPHONE (OUTPATIENT)
Dept: PULMONOLOGY | Age: 53
End: 2023-12-28

## 2023-12-28 DIAGNOSIS — G47.411 NARCOLEPSY AND CATAPLEXY: Primary | ICD-10-CM

## 2024-01-02 RX ORDER — METHYLPHENIDATE HYDROCHLORIDE 20 MG/1
20 TABLET ORAL 3 TIMES DAILY
Qty: 90 TABLET | Refills: 0 | Status: SHIPPED | OUTPATIENT
Start: 2024-03-02 | End: 2024-04-01

## 2024-01-02 RX ORDER — METHYLPHENIDATE HYDROCHLORIDE 20 MG/1
20 TABLET ORAL 3 TIMES DAILY
Qty: 90 TABLET | Refills: 0 | Status: SHIPPED | OUTPATIENT
Start: 2024-01-02 | End: 2024-02-01

## 2024-01-02 RX ORDER — METHYLPHENIDATE HYDROCHLORIDE 20 MG/1
20 TABLET ORAL 3 TIMES DAILY
Qty: 90 TABLET | Refills: 0 | Status: SHIPPED | OUTPATIENT
Start: 2024-02-02 | End: 2024-03-03

## 2024-01-10 ENCOUNTER — OFFICE VISIT (OUTPATIENT)
Dept: PULMONOLOGY | Age: 54
End: 2024-01-10
Payer: MEDICARE

## 2024-01-10 VITALS
HEART RATE: 94 BPM | BODY MASS INDEX: 35.79 KG/M2 | WEIGHT: 250 LBS | DIASTOLIC BLOOD PRESSURE: 99 MMHG | SYSTOLIC BLOOD PRESSURE: 149 MMHG | RESPIRATION RATE: 16 BRPM | OXYGEN SATURATION: 98 % | TEMPERATURE: 98.1 F | HEIGHT: 70 IN

## 2024-01-10 DIAGNOSIS — Z72.0 CONTINUOUS TOBACCO ABUSE: ICD-10-CM

## 2024-01-10 DIAGNOSIS — K21.9 GASTROESOPHAGEAL REFLUX DISEASE WITHOUT ESOPHAGITIS: ICD-10-CM

## 2024-01-10 DIAGNOSIS — G47.52 RBD (REM BEHAVIORAL DISORDER): ICD-10-CM

## 2024-01-10 DIAGNOSIS — G47.00 INSOMNIA, UNSPECIFIED TYPE: ICD-10-CM

## 2024-01-10 DIAGNOSIS — G47.411 NARCOLEPSY AND CATAPLEXY: Primary | ICD-10-CM

## 2024-01-10 DIAGNOSIS — G47.33 OSA (OBSTRUCTIVE SLEEP APNEA): ICD-10-CM

## 2024-01-10 PROCEDURE — 4004F PT TOBACCO SCREEN RCVD TLK: CPT | Performed by: INTERNAL MEDICINE

## 2024-01-10 PROCEDURE — 99214 OFFICE O/P EST MOD 30 MIN: CPT | Performed by: INTERNAL MEDICINE

## 2024-01-10 PROCEDURE — G8484 FLU IMMUNIZE NO ADMIN: HCPCS | Performed by: INTERNAL MEDICINE

## 2024-01-10 PROCEDURE — G8427 DOCREV CUR MEDS BY ELIG CLIN: HCPCS | Performed by: INTERNAL MEDICINE

## 2024-01-10 PROCEDURE — G8417 CALC BMI ABV UP PARAM F/U: HCPCS | Performed by: INTERNAL MEDICINE

## 2024-01-10 PROCEDURE — 3017F COLORECTAL CA SCREEN DOC REV: CPT | Performed by: INTERNAL MEDICINE

## 2024-01-10 ASSESSMENT — ENCOUNTER SYMPTOMS
WHEEZING: 0
EYES NEGATIVE: 1
ALLERGIC/IMMUNOLOGIC NEGATIVE: 1
SORE THROAT: 0
HEMOPTYSIS: 0
VOMITING: 0
SPUTUM PRODUCTION: 0
RHINORRHEA: 0
GASTROINTESTINAL NEGATIVE: 1
SHORTNESS OF BREATH: 1
SWOLLEN GLANDS: 0
ORTHOPNEA: 0
ABDOMINAL PAIN: 0

## 2024-01-10 NOTE — PATIENT INSTRUCTIONS
ASSESSMENT/PLAN:   Diagnosis Orders   1. Narcolepsy and cataplexy        2. Insomnia, unspecified type        3. RBD (REM behavioral disorder)        4. Continuous tobacco abuse        5. DARIEL (obstructive sleep apnea)        6. Gastroesophageal reflux disease without esophagitis          Narcolepsy with Cataplexy  OF NOTE: Stephanie narcolepsy is very severe and has lead to be on social security and incapable of self support     Still having RBD a, crying and dreaming more at night, now also having walking out of the bedroom at night at times, fell down steps     Having cataplexy, still about the same, getting this about 2-3 times a day     Tried and failed  Tried and failed with: nortryptiline and xyrem (tried 2 times)   Tried and failed with Wakix, had side effects, adderall, vyvanse,   Had reaction now a side effect of tongue drying  Adderall 30 mg twice a day  Amitriptyline 50 mg at night- stopped this  Nortriptyline 10 mg at night, - caused nausea       Continue with:  Klonopin 2 mg at night   Melatonin 20 mg at night  Ritalin 20 mg Three times-           Stress is a trigger    Now having cataplexy at about many times a day  And the arms are very hard to move        DARIEL        \  DME [facility]    Autopap is set at min of 5 and max of 20  Using with full facemask  I have access via airview    Need to use at least 4 hours a day    Will need to start using   Setup date was 9/19/22    Unable to use the cpap    Will consider inspire therapy  You may be a good candidate for Inspire upper airway stimulation to treat your moderate to severe obstructive sleep apnea. For more information visit [email or web address]    The ENT I refer to is Dr.L Davie HDZ or Dr. Johnson, Otolaryngologist.    Unable to use cpap b/c of narcolepsy and RBD issues, acts out in the sleep          Smoking  Stopped smoking in Nov 2022  Smoking again willl try to stop soon      LDCT scan done 3/1/22     No suspicious nodules. No acute

## 2024-01-10 NOTE — PROGRESS NOTES
Ines Rojas (: 1970 ) is a 54 y.o. female here for an evaluation of   Chief Complaint   Patient presents with    Follow-up     3  month fu narcolepsy           ASSESSMENT/PLAN:   Diagnosis Orders   1. Narcolepsy and cataplexy        2. Insomnia, unspecified type        3. RBD (REM behavioral disorder)        4. Continuous tobacco abuse        5. DARIEL (obstructive sleep apnea)        6. Gastroesophageal reflux disease without esophagitis          Narcolepsy with Cataplexy  OF NOTE: Stephanie narcolepsy is very severe and has lead to be on social security and incapable of self support     Still having RBD a, crying and dreaming more at night, now also having walking out of the bedroom at night at times, fell down steps     Having cataplexy, still about the same, getting this about 2-3 times a day     Tried and failed  Tried and failed with: nortryptiline and xyrem (tried 2 times)   Tried and failed with Wakix, had side effects, adderall, vyvanse,   Had reaction now a side effect of tongue drying  Adderall 30 mg twice a day  Amitriptyline 50 mg at night- stopped this  Nortriptyline 10 mg at night, - caused nausea       Continue with:  Klonopin 2 mg at night   Melatonin 20 mg at night  Ritalin 20 mg Three times-           Stress is a trigger    Now having cataplexy at about many times a day  And the arms are very hard to move        DARIEL        \  DME [facility]    Autopap is set at min of 5 and max of 20  Using with full facemask  I have access via airview    Need to use at least 4 hours a day    Will need to start using   Setup date was 22    Unable to use the cpap    Will consider inspire therapy  You may be a good candidate for Inspire upper airway stimulation to treat your moderate to severe obstructive sleep apnea. For more information visit [email or web address]    The ENT I refer to is Dr.L Davie HDZ or Dr. Johnson, Otolaryngologist.    Unable to use cpap b/c of narcolepsy and RBD issues, acts

## 2024-01-10 NOTE — PROGRESS NOTES
MA Communication:  The following orders are received by verbal communication from Efrain Reyes MD    Orders include:  follow up 3-4 months

## 2024-01-17 RX ORDER — ALBUTEROL SULFATE 90 UG/1
2 AEROSOL, METERED RESPIRATORY (INHALATION) 4 TIMES DAILY PRN
Qty: 54 EACH | Refills: 1 | Status: SHIPPED | OUTPATIENT
Start: 2024-01-17

## 2024-02-29 ENCOUNTER — OFFICE VISIT (OUTPATIENT)
Dept: DERMATOLOGY | Age: 54
End: 2024-02-29
Payer: MEDICARE

## 2024-02-29 DIAGNOSIS — L81.4 SOLAR LENTIGO: Primary | ICD-10-CM

## 2024-02-29 DIAGNOSIS — D23.5 DERMAL NEVUS OF BACK: ICD-10-CM

## 2024-02-29 DIAGNOSIS — L70.5 ACNE EXCORIEE: ICD-10-CM

## 2024-02-29 DIAGNOSIS — H02.9 EYELID LESION: ICD-10-CM

## 2024-02-29 PROCEDURE — 99213 OFFICE O/P EST LOW 20 MIN: CPT | Performed by: DERMATOLOGY

## 2024-02-29 PROCEDURE — G8427 DOCREV CUR MEDS BY ELIG CLIN: HCPCS | Performed by: DERMATOLOGY

## 2024-02-29 PROCEDURE — G8417 CALC BMI ABV UP PARAM F/U: HCPCS | Performed by: DERMATOLOGY

## 2024-02-29 PROCEDURE — G8484 FLU IMMUNIZE NO ADMIN: HCPCS | Performed by: DERMATOLOGY

## 2024-02-29 PROCEDURE — 4004F PT TOBACCO SCREEN RCVD TLK: CPT | Performed by: DERMATOLOGY

## 2024-02-29 PROCEDURE — 3017F COLORECTAL CA SCREEN DOC REV: CPT | Performed by: DERMATOLOGY

## 2024-02-29 NOTE — PROGRESS NOTES
[facility] Dermatology  Yg Drew MD  [phone]      Ines Rojas  1970    54 y.o. female     Date of Visit: 2/29/2024    Chief Complaint: skin lesions    History of Present Illness:    1.  She reports the gradual accumulation of asymptomatic lesions on the hands and forearms.     2.  She has a history of acne excoriee - improved with use of Cln shampoo and BPO gel daily.     3.  She reports a recurrently inflamed lesion on the left upper eyelid.     4.  She reports an intermittently inflamed and irritated lesion on the left lateral back.    Derm Hx:  Nodular and superficial BCC of the posterior aspect of the left leg-treated with electrodesiccation and curettage on 2/25/2022.  SCC in situ of the left medial calf treated with curettage in March 2016.  BCC the right lower calf treated with curettage in March 2016.  SCC in situ of the left lower shin-treated with curettage on 9/1/2015.    Review of Systems:  Gen: Feels well, good sense of health.    Past Medical History, Family History, Surgical History, Medications and Allergies reviewed.    Past Medical History:   Diagnosis Date    Acid reflux     Anxiety     Atrophic kidney     left    Cancer (HCC)     skin (basal cell)    Cellulitis     recurrent    Cervical disc herniation     Endometriosis     Lumbar disc disorder 02/21/2012    Migraines     Narcolepsy     Nephrolithiasis 02/21/2012    Ovarian cyst 02/21/2012     Past Surgical History:   Procedure Laterality Date    FOOT SURGERY Left 12/27/2012    BROSTROM ANKLE LIGAMENT REPAIR LEFT, PARTIAL EXCISION OF BONE    LAPAROSCOPY      for endometriosis    LUMBAR FUSION  01/01/2008    SHOULDER SURGERY Left     RCR    TONSILLECTOMY  01/01/2006    UPPER GASTROINTESTINAL ENDOSCOPY N/A 09/15/2022    EGD BIOPSY performed by Nav Hill MD at [facility] ENDOSCOPY    UPPER GASTROINTESTINAL ENDOSCOPY N/A 12/09/2022    EGD BIOPSY performed by Gonsalo Reyes DO at [facility] ENDOSCOPY

## 2024-03-08 ENCOUNTER — OFFICE VISIT (OUTPATIENT)
Dept: DERMATOLOGY | Age: 54
End: 2024-03-08

## 2024-03-08 DIAGNOSIS — L82.0 INFLAMED SEBORRHEIC KERATOSIS: Primary | ICD-10-CM

## 2024-03-08 DIAGNOSIS — D22.71: ICD-10-CM

## 2024-03-08 DIAGNOSIS — D22.5 IRRITATED NEVUS OF BACK: ICD-10-CM

## 2024-03-08 NOTE — PROGRESS NOTES
[facility] Dermatology  Yg Drew MD  [phone]      Ines Rojas  1970    54 y.o. female     Date of Visit: 3/8/2024    Chief Complaint: skin lesions    History of Present Illness:    1.  She presents today for pruritic and painful lesions of the left upper eyelid.    2.,  3.  She also complains of recurrently traumatized and painful moles on the left upper back and right thigh.      Review of Systems:  Gen: Feels well, good sense of health.    Past Medical History, Family History, Surgical History, Medications and Allergies reviewed.    Past Medical History:   Diagnosis Date    Acid reflux     Anxiety     Atrophic kidney     left    Cancer (HCC)     skin (basal cell)    Cellulitis     recurrent    Cervical disc herniation     Endometriosis     Lumbar disc disorder 02/21/2012    Migraines     Narcolepsy     Nephrolithiasis 02/21/2012    Ovarian cyst 02/21/2012     Past Surgical History:   Procedure Laterality Date    FOOT SURGERY Left 12/27/2012    BROSTROM ANKLE LIGAMENT REPAIR LEFT, PARTIAL EXCISION OF BONE    LAPAROSCOPY      for endometriosis    LUMBAR FUSION  01/01/2008    SHOULDER SURGERY Left     RCR    TONSILLECTOMY  01/01/2006    UPPER GASTROINTESTINAL ENDOSCOPY N/A 09/15/2022    [facility]    UPPER GASTROINTESTINAL ENDOSCOPY N/A 12/09/2022    EGD BIOPSY performed by Gonsalo Reyes DO at [facility] ENDOSCOPY       Allergies   Allergen Reactions    Codeine Hives    Morphine And Related Other (See Comments) and Rash     ineffective      Percocet [Oxycodone-Acetaminophen] Itching    Prednisone     Tetracyclines & Related      Outpatient Medications Marked as Taking for the 3/8/24 encounter (Office Visit) with Yg Drew MD   Medication Sig Dispense Refill    albuterol sulfate HFA (PROVENTIL;VENTOLIN;PROAIR) 108 (90 Base) MCG/ACT inhaler INHALE 2 PUFFS INTO THE LUNGS 4 TIMES DAILY AS NEEDED FOR WHEEZING. 54 each 1

## 2024-03-08 NOTE — PATIENT INSTRUCTIONS
Biopsy Wound Care Instructions    Keep the bandage in place for 24 hours.   Cleanse the wound with mild soapy water daily  Gently dry the area.  Apply Vaseline or petroleum jelly to the wound using a cotton tipped applicator.  Cover with a clean bandage.  Repeat this process until the biopsy site is healed.  If you had stitches placed, continue treating the site until the stitches are removed. Remember to make an appointment to return to have your stitches removed by our staff.  You may shower and bathe as usual.       ** Biopsy results generally take around 7 business days to come back.  If you have not heard from us by then, please call the office at [phone].    *Please note that biopsy results are released to both the patient and physician at the same time in [facility].  Please allow time for your physician to review the results.  One of our staff members will reach out to you with the results and plan.

## 2024-03-18 ENCOUNTER — HOSPITAL ENCOUNTER (OUTPATIENT)
Dept: CT IMAGING | Age: 54
Discharge: HOME | End: 2024-03-18
Attending: INTERNAL MEDICINE
Payer: MEDICARE

## 2024-03-18 DIAGNOSIS — Z87.891 PERSONAL HISTORY OF TOBACCO USE: ICD-10-CM

## 2024-03-18 PROCEDURE — 71271 CT THORAX LUNG CANCER SCR C-: CPT

## 2024-03-26 ENCOUNTER — TELEPHONE (OUTPATIENT)
Dept: CASE MANAGEMENT | Age: 54
End: 2024-03-26

## 2024-03-26 NOTE — TELEPHONE ENCOUNTER
Baseline lung screen on 3/18/24. LRAD1. Recommended screen in one year. Reviewed by ordering physician and ordering office contacts pt with results. Results letter mailed. Will cont to monitor follow-up recommendations.     Ann GRAYN, RN   Lung Nodule Navigator  [facility]  [phone]

## 2024-04-23 ENCOUNTER — OFFICE VISIT (OUTPATIENT)
Dept: PULMONOLOGY | Age: 54
End: 2024-04-23
Payer: MEDICARE

## 2024-04-23 VITALS
HEIGHT: 70 IN | BODY MASS INDEX: 36.08 KG/M2 | RESPIRATION RATE: 16 BRPM | WEIGHT: 252 LBS | HEART RATE: 100 BPM | TEMPERATURE: 97.4 F | SYSTOLIC BLOOD PRESSURE: 119 MMHG | OXYGEN SATURATION: 96 % | DIASTOLIC BLOOD PRESSURE: 85 MMHG

## 2024-04-23 DIAGNOSIS — E66.9 CLASS 2 OBESITY WITH BODY MASS INDEX (BMI) OF 38.0 TO 38.9 IN ADULT, UNSPECIFIED OBESITY TYPE, UNSPECIFIED WHETHER SERIOUS COMORBIDITY PRESENT: ICD-10-CM

## 2024-04-23 DIAGNOSIS — G47.00 INSOMNIA, UNSPECIFIED TYPE: ICD-10-CM

## 2024-04-23 DIAGNOSIS — Z72.0 CONTINUOUS TOBACCO ABUSE: ICD-10-CM

## 2024-04-23 DIAGNOSIS — K21.9 GASTROESOPHAGEAL REFLUX DISEASE WITHOUT ESOPHAGITIS: ICD-10-CM

## 2024-04-23 DIAGNOSIS — G47.33 OSA (OBSTRUCTIVE SLEEP APNEA): ICD-10-CM

## 2024-04-23 DIAGNOSIS — G47.411 NARCOLEPSY AND CATAPLEXY: Primary | ICD-10-CM

## 2024-04-23 DIAGNOSIS — G47.52 REM SLEEP BEHAVIOR DISORDER: ICD-10-CM

## 2024-04-23 DIAGNOSIS — G47.52 RBD (REM BEHAVIORAL DISORDER): ICD-10-CM

## 2024-04-23 PROCEDURE — G8427 DOCREV CUR MEDS BY ELIG CLIN: HCPCS | Performed by: INTERNAL MEDICINE

## 2024-04-23 PROCEDURE — 4004F PT TOBACCO SCREEN RCVD TLK: CPT | Performed by: INTERNAL MEDICINE

## 2024-04-23 PROCEDURE — G8417 CALC BMI ABV UP PARAM F/U: HCPCS | Performed by: INTERNAL MEDICINE

## 2024-04-23 PROCEDURE — 3017F COLORECTAL CA SCREEN DOC REV: CPT | Performed by: INTERNAL MEDICINE

## 2024-04-23 PROCEDURE — 99215 OFFICE O/P EST HI 40 MIN: CPT | Performed by: INTERNAL MEDICINE

## 2024-04-23 RX ORDER — METHYLPHENIDATE HYDROCHLORIDE 20 MG/1
20 TABLET ORAL 3 TIMES DAILY
Qty: 90 TABLET | Refills: 0 | Status: SHIPPED | OUTPATIENT
Start: 2024-04-23 | End: 2024-05-23

## 2024-04-23 RX ORDER — METHYLPHENIDATE HYDROCHLORIDE 20 MG/1
20 TABLET ORAL 3 TIMES DAILY
Qty: 90 TABLET | Refills: 0 | Status: SHIPPED | OUTPATIENT
Start: 2024-05-23 | End: 2024-06-22

## 2024-04-23 RX ORDER — ALBUTEROL SULFATE 90 UG/1
2 AEROSOL, METERED RESPIRATORY (INHALATION) 4 TIMES DAILY PRN
Qty: 54 G | Refills: 1 | Status: SHIPPED | OUTPATIENT
Start: 2024-04-23

## 2024-04-23 RX ORDER — CLONAZEPAM 1 MG/1
2 TABLET ORAL NIGHTLY
Qty: 60 TABLET | Refills: 2 | Status: SHIPPED | OUTPATIENT
Start: 2024-04-23 | End: 2024-05-23

## 2024-04-23 RX ORDER — METHYLPHENIDATE HYDROCHLORIDE 20 MG/1
20 TABLET ORAL 3 TIMES DAILY
Qty: 90 TABLET | Refills: 0 | Status: SHIPPED | OUTPATIENT
Start: 2024-06-23 | End: 2024-07-23

## 2024-04-23 RX ORDER — METHYLPHENIDATE HYDROCHLORIDE 20 MG/1
TABLET ORAL
COMMUNITY
Start: 2018-01-01

## 2024-04-23 RX ORDER — VARENICLINE TARTRATE 0.5 (11)-1
KIT ORAL
COMMUNITY
Start: 2024-04-12

## 2024-04-23 ASSESSMENT — ENCOUNTER SYMPTOMS
ABDOMINAL PAIN: 0
GASTROINTESTINAL NEGATIVE: 1
SWOLLEN GLANDS: 0
HEMOPTYSIS: 0
RHINORRHEA: 0
ALLERGIC/IMMUNOLOGIC NEGATIVE: 1
ORTHOPNEA: 0
SPUTUM PRODUCTION: 0
VOMITING: 0
WHEEZING: 0
SORE THROAT: 0
EYES NEGATIVE: 1
SHORTNESS OF BREATH: 1

## 2024-04-23 NOTE — PROGRESS NOTES
MA Communication:  The following orders are received by verbal communication from Efrain Reyes MD    Orders include:  Following Dr Reyes    
  Skin: Negative.  Negative for rash.   Allergic/Immunologic: Negative.    Neurological:  Positive for headaches.   Hematological: Negative.    Psychiatric/Behavioral: Negative.           Vitals:    04/23/24 1105   BP: 119/85   Site: Left Upper Arm   Position: Sitting   Cuff Size: Large Adult   Pulse: 100   Resp: 16   Temp: 97.4 °F (36.3 °C)   TempSrc: Temporal   SpO2: 96%   Weight: 114.3 kg (252 lb)   Height: 1.778 m (5' 10\")            Physical Exam  Vitals and nursing note reviewed.   Constitutional:       General: She is not in acute distress.     Appearance: Normal appearance. She is not ill-appearing.   HENT:      Head: Normocephalic and atraumatic.      Right Ear: External ear normal.      Left Ear: External ear normal.      Nose: Nose normal.      Mouth/Throat:      Mouth: Mucous membranes are moist.      Pharynx: Oropharynx is clear.      Comments: Mallampati 3  Eyes:      General: No scleral icterus.     Extraocular Movements: Extraocular movements intact.      Conjunctiva/sclera: Conjunctivae normal.      Pupils: Pupils are equal, round, and reactive to light.   Cardiovascular:      Rate and Rhythm: Normal rate and regular rhythm.      Pulses: Normal pulses.      Heart sounds: Normal heart sounds. No murmur heard.     No friction rub.   Pulmonary:      Effort: No respiratory distress.      Breath sounds: No wheezing or rales.   Abdominal:      General: Abdomen is flat. Bowel sounds are normal. There is no distension.      Tenderness: There is no abdominal tenderness. There is no guarding.   Musculoskeletal:         General: No swelling or tenderness. Normal range of motion.      Cervical back: Normal range of motion and neck supple. No rigidity.   Skin:     General: Skin is warm and dry.      Coloration: Skin is not jaundiced.   Neurological:      General: No focal deficit present.      Mental Status: She is alert and oriented to person, place, and time. Mental status is at baseline.      Cranial Nerves:

## 2024-04-23 NOTE — PATIENT INSTRUCTIONS
ASSESSMENT/PLAN:   Diagnosis Orders   1. Narcolepsy and cataplexy        2. Insomnia, unspecified type        3. RBD (REM behavioral disorder)        4. Continuous tobacco abuse        5. DARIEL (obstructive sleep apnea)        6. Gastroesophageal reflux disease without esophagitis        7. Class 2 obesity with body mass index (BMI) of 38.0 to 38.9 in adult, unspecified obesity type, unspecified whether serious comorbidity present        8. REM sleep behavior disorder          Narcolepsy with Cataplexy  OF NOTE: Srirams narcolepsy is very severe and has lead to be on social security and incapable of self support     Still having RBD a, crying and dreaming more at night, now also having walking out of the bedroom at night at times, fell down steps     Having cataplexy, still about the same, getting this about 2-3 times a day  Cataplexy: weakness of arms and legs     Tried and failed  Tried and failed with: nortryptiline and xyrem (tried 2 times)   Tried and failed with Wakix, had side effects, adderall, vyvanse,   Had reaction now a side effect of tongue drying  Adderall 30 mg twice a day  Amitriptyline 50 mg at night- stopped this  Nortriptyline 10 mg at night, - caused nausea       Continue with:  Klonopin 2 mg at night   Melatonin 20 mg at night  Ritalin 20 mg Three times-           Stress is a trigger    Now having cataplexy at about many times a day  And the arms are very hard to move        From a narcolepsy point of view in regards to her potential surgery for hip replacement, right hip, I would strongly advise that the patient be taken care of at least for 24 to 48 hours in the hospital as the patient has a severe form of cataplexy.  I have never been able to control her cataplexy.  And when she gets into pain she will lose the ability to maintain her muscles of the lower extremity in the upper extremities and may fall.    She will need proper medications for her pain control, however pain medications do not

## 2024-12-05 ENCOUNTER — TELEPHONE (OUTPATIENT)
Dept: DERMATOLOGY | Age: 54
End: 2024-12-05

## 2024-12-05 NOTE — TELEPHONE ENCOUNTER
Patient call and would like to come she has some  white spots by her lips, that appear and then go away. And now left check, jaw line.been going on for 3 months.      [phone]      .

## 2024-12-09 NOTE — TELEPHONE ENCOUNTER
No showed appointment on 8/29/24.  Upcoming appointment on 3/4/25.    Called and left message for patient to call back office.      Please offer patient opening for today, 12/10, or 12/11 if still available.

## 2024-12-10 NOTE — TELEPHONE ENCOUNTER
Called and left message for patient to call back office.     Please offer patient opening for 12/18 if still available.

## 2025-03-04 ENCOUNTER — OFFICE VISIT (OUTPATIENT)
Age: 55
End: 2025-03-04
Payer: MEDICARE

## 2025-03-04 DIAGNOSIS — L82.1 SK (SEBORRHEIC KERATOSIS): Primary | ICD-10-CM

## 2025-03-04 DIAGNOSIS — D48.5 NEOPLASM OF UNCERTAIN BEHAVIOR OF SKIN: ICD-10-CM

## 2025-03-04 DIAGNOSIS — L57.0 ACTINIC KERATOSIS: ICD-10-CM

## 2025-03-04 DIAGNOSIS — L81.4 SOLAR LENTIGINOSIS: ICD-10-CM

## 2025-03-04 PROCEDURE — 11102 TANGNTL BX SKIN SINGLE LES: CPT | Performed by: DERMATOLOGY

## 2025-03-04 PROCEDURE — G8427 DOCREV CUR MEDS BY ELIG CLIN: HCPCS | Performed by: DERMATOLOGY

## 2025-03-04 PROCEDURE — 17000 DESTRUCT PREMALG LESION: CPT | Performed by: DERMATOLOGY

## 2025-03-04 PROCEDURE — 99213 OFFICE O/P EST LOW 20 MIN: CPT | Performed by: DERMATOLOGY

## 2025-03-04 PROCEDURE — 3017F COLORECTAL CA SCREEN DOC REV: CPT | Performed by: DERMATOLOGY

## 2025-03-04 PROCEDURE — G8417 CALC BMI ABV UP PARAM F/U: HCPCS | Performed by: DERMATOLOGY

## 2025-03-04 PROCEDURE — 17003 DESTRUCT PREMALG LES 2-14: CPT | Performed by: DERMATOLOGY

## 2025-03-04 PROCEDURE — 4004F PT TOBACCO SCREEN RCVD TLK: CPT | Performed by: DERMATOLOGY

## 2025-03-04 NOTE — PATIENT INSTRUCTIONS
Biopsy Wound Care Instructions    Keep the bandage in place for 24 hours.   Cleanse the wound with mild soapy water daily  Gently dry the area.  Apply Vaseline or petroleum jelly to the wound using a cotton tipped applicator.  Cover with a clean bandage.  Repeat this process until the biopsy site is healed.  If you had stitches placed, continue treating the site until the stitches are removed. Remember to make an appointment to return to have your stitches removed by our staff.  You may shower and bathe as usual.       ** Biopsy results generally take around 7 business days to come back.  If you have not heard from us by then, please call the office at [phone].    *Please note that biopsy results are released to both the patient and physician at the same time in [facility] [employer].  Please allow time for your physician to review the results.  One of our staff members will reach out to you with the results and plan.

## 2025-03-04 NOTE — PROGRESS NOTES
[facility] Dermatology  Yg Drew MD  [phone]      Ines Rojas  1970    55 y.o. female     Date of Visit: 3/4/2025    Chief Complaint: skin lesions    History of Present Illness:    1.  She reports few persistent growths on the thighs.    2.  She reports stable freckling on the extremities.    3.  She reports several recurrently scaly lesions on the upper portions of the lip and also on the cheeks.    4.  Unknown duration of an asymptomatic pink lesion on the right upper chest.    Derm Hx:  Nodular and superficial BCC of the posterior aspect of the left leg-treated with electrodesiccation and curettage on 2/25/2022.  SCC in situ of the left medial calf treated with curettage in March 2016.  BCC the right lower calf treated with curettage in March 2016.  SCC in situ of the left lower shin-treated with curettage on 9/1/2015.    Reports recent R hip replacement.       Review of Systems:  Gen: Feels well, good sense of health.    Past Medical History, Family History, Surgical History, Medications and Allergies reviewed.    Past Medical History:   Diagnosis Date    Acid reflux     Anxiety     Atrophic kidney     left    Cancer (HCC)     skin (basal cell)    Cellulitis     recurrent    Cervical disc herniation     Endometriosis     Lumbar disc disorder 02/21/2012    Migraines     Narcolepsy     Nephrolithiasis 02/21/2012    Ovarian cyst 02/21/2012     Past Surgical History:   Procedure Laterality Date    FOOT SURGERY Left 12/27/2012    BROSTROM ANKLE LIGAMENT REPAIR LEFT, PARTIAL EXCISION OF BONE    JOINT REPLACEMENT Right 09/2024    LAPAROSCOPY      for endometriosis    LUMBAR FUSION  01/01/2008    SHOULDER SURGERY Left     RCR    TONSILLECTOMY  01/01/2006    UPPER GASTROINTESTINAL ENDOSCOPY N/A 09/15/2022    [facility]    UPPER GASTROINTESTINAL ENDOSCOPY N/A 12/09/2022    EGD BIOPSY performed by Gonsalo Reyes DO at [facility]

## 2025-03-05 ENCOUNTER — TELEPHONE (OUTPATIENT)
Dept: CASE MANAGEMENT | Age: 55
End: 2025-03-05

## 2025-03-07 LAB — DERMATOLOGY PATHOLOGY REPORT: ABNORMAL

## 2025-03-09 ENCOUNTER — RESULTS FOLLOW-UP (OUTPATIENT)
Age: 55
End: 2025-03-09

## 2025-04-04 ENCOUNTER — PROCEDURE VISIT (OUTPATIENT)
Age: 55
End: 2025-04-04

## 2025-04-04 DIAGNOSIS — C44.519 BCC (BASAL CELL CARCINOMA), CHEST: Primary | ICD-10-CM

## 2025-04-04 NOTE — PROGRESS NOTES
[facility] Dermatology  Yg Drew MD  [phone]      Ines Rojas  1970    55 y.o. female     Date of Visit: 4/4/2025    Chief Complaint: BCC    History of Present Illness:    Here today for treatment of a superficial nodular basal cell carcinoma on the right upper chest.    ACCESSION   [id number]     DIAGNOSIS     RIGHT UPPER CHEST   Superficial and nodular basal cell carcinoma     Pathologist Signature   Lilo Esparza MD       Review of Systems:  Gen: Feels well, good sense of health.  Heme: No abnormal bruising or bleeding.    Past Medical History, Family History, Surgical History, Medications and Allergies reviewed.    Past Medical History:   Diagnosis Date    Acid reflux     Anxiety     Atrophic kidney     left    Cancer (HCC)     skin (basal cell)    Cellulitis     recurrent    Cervical disc herniation     Endometriosis     Lumbar disc disorder 02/21/2012    Migraines     Narcolepsy     Nephrolithiasis 02/21/2012    Ovarian cyst 02/21/2012     Past Surgical History:   Procedure Laterality Date    FOOT SURGERY Left 12/27/2012    BROSTROM ANKLE LIGAMENT REPAIR LEFT, PARTIAL EXCISION OF BONE    JOINT REPLACEMENT Right 09/2024    LAPAROSCOPY      for endometriosis    LUMBAR FUSION  01/01/2008    SHOULDER SURGERY Left     RCR    TONSILLECTOMY  01/01/2006    UPPER GASTROINTESTINAL ENDOSCOPY N/A 09/15/2022    [facility]    UPPER GASTROINTESTINAL ENDOSCOPY N/A 12/09/2022    EGD BIOPSY performed by Gonsalo Reyes DO at [facility] ENDOSCOPY       Allergies   Allergen Reactions    Codeine Hives    Morphine And Codeine Other (See Comments) and Rash     ineffective      Percocet [Oxycodone-Acetaminophen] Itching    Prednisone     Tetracyclines & Related      Outpatient Medications Marked as Taking for the 4/4/25 encounter (Procedure visit) with Yg Drew MD   Medication Sig Dispense Refill    methylphenidate (RITALIN) 20 MG

## 2025-04-10 ENCOUNTER — RESULTS FOLLOW-UP (OUTPATIENT)
Age: 55
End: 2025-04-10

## 2025-04-10 ENCOUNTER — TELEPHONE (OUTPATIENT)
Age: 55
End: 2025-04-10

## 2025-04-10 LAB — DERMATOLOGY PATHOLOGY REPORT: ABNORMAL

## 2025-04-15 ENCOUNTER — TELEPHONE (OUTPATIENT)
Dept: CASE MANAGEMENT | Age: 55
End: 2025-04-15

## 2025-05-16 ENCOUNTER — TELEPHONE (OUTPATIENT)
Age: 55
End: 2025-05-16

## 2025-05-16 NOTE — TELEPHONE ENCOUNTER
Pt called states she has bumps on both arms near the arm pits, they are painful. She has 1 bump on rt breast and has something white on it. They started a couple months ago. She also has some on both thighs.    [phone]

## 2025-05-21 ENCOUNTER — OFFICE VISIT (OUTPATIENT)
Age: 55
End: 2025-05-21

## 2025-05-21 DIAGNOSIS — L30.4 INTERTRIGO: Primary | ICD-10-CM

## 2025-05-21 DIAGNOSIS — L91.8 CUTANEOUS SKIN TAGS: ICD-10-CM

## 2025-05-21 DIAGNOSIS — L82.1 SK (SEBORRHEIC KERATOSIS): ICD-10-CM

## 2025-05-21 NOTE — PROGRESS NOTES
[facility] Dermatology  Yg Drew MD  [phone]      Ines Rojas  1970    55 y.o. female     Date of Visit: 5/21/2025    Chief Complaint: skin lesions of concern    History of Present Illness:    1.  She reports irritation under the breasts.  Comes and goes.     2.  She reports recurrently irritated and inflamed skin tags under the arms and also on the inner thighs.    3.  She also reports several growths on the thighs and inframammary region.    Dermatologic history:    Superficial and nodular basal cell carcinoma of the right central upper chest-excised on 4/4/2025.  Nodular and superficial BCC of the posterior aspect of the left leg-treated with electrodesiccation and curettage on 2/25/2022.  SCC in situ of the left medial calf treated with curettage in March 2016.  BCC the right lower calf treated with curettage in March 2016.  SCC in situ of the left lower shin-treated with curettage on 9/1/2015.    Review of Systems:  Gen: Feels well, good sense of health.    Past Medical History, Family History, Surgical History, Medications and Allergies reviewed.    Past Medical History:   Diagnosis Date    Acid reflux     Anxiety     Atrophic kidney     left    Cancer (HCC)     skin (basal cell)    Cellulitis     recurrent    Cervical disc herniation     Endometriosis     Lumbar disc disorder 02/21/2012    Migraines     Narcolepsy     Nephrolithiasis 02/21/2012    Ovarian cyst 02/21/2012     Past Surgical History:   Procedure Laterality Date    FOOT SURGERY Left 12/27/2012    BROSTROM ANKLE LIGAMENT REPAIR LEFT, PARTIAL EXCISION OF BONE    JOINT REPLACEMENT Right 09/2024    LAPAROSCOPY      for endometriosis    LUMBAR FUSION  01/01/2008    SHOULDER SURGERY Left     RCR    TONSILLECTOMY  01/01/2006    UPPER GASTROINTESTINAL ENDOSCOPY N/A 09/15/2022    EGD BIOPSY performed by Nav Hill MD at [facility] ENDOSCOPY    UPPER GASTROINTESTINAL ENDOSCOPY N/A 12/09/2022    EGD BIOPSY performed by

## 2025-05-21 NOTE — PATIENT INSTRUCTIONS
For under breast, try barrier cream like...  - Desitin  - Aquaphor  - Cicaplast  - Cavilon Durable Barrier Cream

## 2025-05-28 ENCOUNTER — TELEPHONE (OUTPATIENT)
Dept: CASE MANAGEMENT | Age: 55
End: 2025-05-28

## (undated) DEVICE — ENDOSCOPIC KIT 2 12 FT OP4 DE2 GWN SYR

## (undated) DEVICE — CANNULA NSL AD TBNG L7FT PVC STR NONFLARED PRNG O2 DEL W STD

## (undated) DEVICE — FORCEPS BX L240CM JAW DIA2.8MM L CAP W/ NDL MIC MESH TOOTH

## (undated) DEVICE — FORCEP BX STD CAP 240CM RAD JAW 4

## (undated) DEVICE — CONMED SCOPE SAVER BITE BLOCK, 20X27 MM: Brand: SCOPE SAVER

## (undated) DEVICE — YANKAUER,BULB TIP,W/O VENT,RIGID,STERILE: Brand: MEDLINE

## (undated) DEVICE — ELECTRODE,ECG,STRESS,FOAM,3PK: Brand: MEDLINE

## (undated) DEVICE — CANNULA,OXY,ADULT,SUPERSOFT,W/7'TUB,SC: Brand: MEDLINE INDUSTRIES, INC.